# Patient Record
Sex: FEMALE | Race: WHITE | Employment: UNEMPLOYED | URBAN - METROPOLITAN AREA
[De-identification: names, ages, dates, MRNs, and addresses within clinical notes are randomized per-mention and may not be internally consistent; named-entity substitution may affect disease eponyms.]

---

## 2018-08-30 ENCOUNTER — TRANSFERRED RECORDS (OUTPATIENT)
Dept: HEALTH INFORMATION MANAGEMENT | Facility: CLINIC | Age: 43
End: 2018-08-30

## 2018-08-31 LAB
ALT SERPL-CCNC: 24 U/L (ref 7–55)
AST SERPL-CCNC: 13 U/L (ref 5–34)
CREAT SERPL-MCNC: 0.8 MG/DL (ref 0.6–1.3)
GFR SERPL CREATININE-BSD FRML MDRD: >60 ML/MIN/1.73M2
GLUCOSE SERPL-MCNC: 89 MG/DL (ref 70–99)
POTASSIUM SERPL-SCNC: 3.9 MMOL/L (ref 3.6–5.5)

## 2018-09-01 ENCOUNTER — HOSPITAL ENCOUNTER (INPATIENT)
Facility: CLINIC | Age: 43
LOS: 11 days | Discharge: HOME HEALTH | End: 2018-09-12
Attending: NEUROLOGICAL SURGERY | Admitting: NEUROLOGICAL SURGERY
Payer: COMMERCIAL

## 2018-09-01 ENCOUNTER — APPOINTMENT (OUTPATIENT)
Dept: GENERAL RADIOLOGY | Facility: CLINIC | Age: 43
End: 2018-09-01
Attending: STUDENT IN AN ORGANIZED HEALTH CARE EDUCATION/TRAINING PROGRAM
Payer: COMMERCIAL

## 2018-09-01 DIAGNOSIS — T85.730A: Primary | ICD-10-CM

## 2018-09-01 PROBLEM — T85.618A SHUNT MALFUNCTION: Status: ACTIVE | Noted: 2018-09-01

## 2018-09-01 LAB
ALBUMIN UR-MCNC: NEGATIVE MG/DL
APPEARANCE UR: CLEAR
BACTERIA #/AREA URNS HPF: ABNORMAL /HPF
BILIRUB UR QL STRIP: NEGATIVE
COLOR UR AUTO: ABNORMAL
GLUCOSE UR STRIP-MCNC: NEGATIVE MG/DL
HGB UR QL STRIP: NEGATIVE
KETONES UR STRIP-MCNC: NEGATIVE MG/DL
LEUKOCYTE ESTERASE UR QL STRIP: NEGATIVE
MUCOUS THREADS #/AREA URNS LPF: PRESENT /LPF
NITRATE UR QL: NEGATIVE
PH UR STRIP: 6 PH (ref 5–7)
RBC #/AREA URNS AUTO: <1 /HPF (ref 0–2)
SOURCE: ABNORMAL
SP GR UR STRIP: 1.01 (ref 1–1.03)
SQUAMOUS #/AREA URNS AUTO: <1 /HPF (ref 0–1)
UROBILINOGEN UR STRIP-MCNC: NORMAL MG/DL (ref 0–2)
WBC #/AREA URNS AUTO: 1 /HPF (ref 0–5)

## 2018-09-01 PROCEDURE — 25000132 ZZH RX MED GY IP 250 OP 250 PS 637: Performed by: STUDENT IN AN ORGANIZED HEALTH CARE EDUCATION/TRAINING PROGRAM

## 2018-09-01 PROCEDURE — 87040 BLOOD CULTURE FOR BACTERIA: CPT | Performed by: STUDENT IN AN ORGANIZED HEALTH CARE EDUCATION/TRAINING PROGRAM

## 2018-09-01 PROCEDURE — 36415 COLL VENOUS BLD VENIPUNCTURE: CPT | Performed by: STUDENT IN AN ORGANIZED HEALTH CARE EDUCATION/TRAINING PROGRAM

## 2018-09-01 PROCEDURE — 87070 CULTURE OTHR SPECIMN AEROBIC: CPT | Performed by: STUDENT IN AN ORGANIZED HEALTH CARE EDUCATION/TRAINING PROGRAM

## 2018-09-01 PROCEDURE — 74021 RADEX ABDOMEN 3+ VIEWS: CPT

## 2018-09-01 PROCEDURE — 84157 ASSAY OF PROTEIN OTHER: CPT | Performed by: STUDENT IN AN ORGANIZED HEALTH CARE EDUCATION/TRAINING PROGRAM

## 2018-09-01 PROCEDURE — 12000008 ZZH R&B INTERMEDIATE UMMC

## 2018-09-01 PROCEDURE — 87077 CULTURE AEROBIC IDENTIFY: CPT | Performed by: STUDENT IN AN ORGANIZED HEALTH CARE EDUCATION/TRAINING PROGRAM

## 2018-09-01 PROCEDURE — 82945 GLUCOSE OTHER FLUID: CPT | Performed by: STUDENT IN AN ORGANIZED HEALTH CARE EDUCATION/TRAINING PROGRAM

## 2018-09-01 PROCEDURE — 40000065 ZZH STATISTIC EKG NON-CHARGEABLE

## 2018-09-01 PROCEDURE — 87205 SMEAR GRAM STAIN: CPT | Performed by: STUDENT IN AN ORGANIZED HEALTH CARE EDUCATION/TRAINING PROGRAM

## 2018-09-01 PROCEDURE — 87186 SC STD MICRODIL/AGAR DIL: CPT | Performed by: STUDENT IN AN ORGANIZED HEALTH CARE EDUCATION/TRAINING PROGRAM

## 2018-09-01 PROCEDURE — 93010 ELECTROCARDIOGRAM REPORT: CPT | Performed by: INTERNAL MEDICINE

## 2018-09-01 PROCEDURE — 81001 URINALYSIS AUTO W/SCOPE: CPT | Performed by: STUDENT IN AN ORGANIZED HEALTH CARE EDUCATION/TRAINING PROGRAM

## 2018-09-01 RX ORDER — HYDRALAZINE HYDROCHLORIDE 20 MG/ML
10-20 INJECTION INTRAMUSCULAR; INTRAVENOUS EVERY 30 MIN PRN
Status: DISCONTINUED | OUTPATIENT
Start: 2018-09-01 | End: 2018-09-02

## 2018-09-01 RX ORDER — BUPROPION HYDROCHLORIDE 200 MG/1
200 TABLET, EXTENDED RELEASE ORAL DAILY
Status: DISCONTINUED | OUTPATIENT
Start: 2018-09-02 | End: 2018-09-02

## 2018-09-01 RX ORDER — LABETALOL HYDROCHLORIDE 5 MG/ML
10-40 INJECTION, SOLUTION INTRAVENOUS EVERY 10 MIN PRN
Status: DISCONTINUED | OUTPATIENT
Start: 2018-09-01 | End: 2018-09-02

## 2018-09-01 RX ORDER — NALOXONE HYDROCHLORIDE 0.4 MG/ML
.1-.4 INJECTION, SOLUTION INTRAMUSCULAR; INTRAVENOUS; SUBCUTANEOUS
Status: DISCONTINUED | OUTPATIENT
Start: 2018-09-01 | End: 2018-09-12 | Stop reason: HOSPADM

## 2018-09-01 RX ORDER — OXYCODONE HYDROCHLORIDE 5 MG/1
5 TABLET ORAL EVERY 4 HOURS PRN
Status: DISCONTINUED | OUTPATIENT
Start: 2018-09-01 | End: 2018-09-02

## 2018-09-01 RX ORDER — LIDOCAINE 40 MG/G
CREAM TOPICAL
Status: DISCONTINUED | OUTPATIENT
Start: 2018-09-01 | End: 2018-09-12 | Stop reason: HOSPADM

## 2018-09-01 RX ORDER — ONDANSETRON 2 MG/ML
4-8 INJECTION INTRAMUSCULAR; INTRAVENOUS EVERY 6 HOURS PRN
Status: DISCONTINUED | OUTPATIENT
Start: 2018-09-01 | End: 2018-09-12 | Stop reason: HOSPADM

## 2018-09-01 RX ORDER — SODIUM CHLORIDE 9 MG/ML
INJECTION, SOLUTION INTRAVENOUS CONTINUOUS
Status: DISCONTINUED | OUTPATIENT
Start: 2018-09-01 | End: 2018-09-01

## 2018-09-01 RX ORDER — LEVOTHYROXINE SODIUM 75 UG/1
75 TABLET ORAL
Status: DISCONTINUED | OUTPATIENT
Start: 2018-09-02 | End: 2018-09-12 | Stop reason: HOSPADM

## 2018-09-01 RX ORDER — ONDANSETRON 4 MG/1
4-8 TABLET, ORALLY DISINTEGRATING ORAL EVERY 6 HOURS PRN
Status: DISCONTINUED | OUTPATIENT
Start: 2018-09-01 | End: 2018-09-12 | Stop reason: HOSPADM

## 2018-09-01 RX ORDER — PROCHLORPERAZINE MALEATE 10 MG
10 TABLET ORAL EVERY 6 HOURS PRN
Status: DISCONTINUED | OUTPATIENT
Start: 2018-09-01 | End: 2018-09-12 | Stop reason: HOSPADM

## 2018-09-01 RX ORDER — SODIUM CHLORIDE 9 MG/ML
INJECTION, SOLUTION INTRAVENOUS CONTINUOUS
Status: DISCONTINUED | OUTPATIENT
Start: 2018-09-02 | End: 2018-09-02

## 2018-09-01 RX ORDER — ACETAMINOPHEN 325 MG/1
650 TABLET ORAL EVERY 4 HOURS PRN
Status: DISCONTINUED | OUTPATIENT
Start: 2018-09-01 | End: 2018-09-12 | Stop reason: HOSPADM

## 2018-09-01 RX ORDER — POTASSIUM CHLORIDE 29.8 MG/ML
20 INJECTION INTRAVENOUS
Status: DISCONTINUED | OUTPATIENT
Start: 2018-09-01 | End: 2018-09-12 | Stop reason: HOSPADM

## 2018-09-01 RX ORDER — POTASSIUM CHLORIDE 750 MG/1
20-40 TABLET, EXTENDED RELEASE ORAL
Status: DISCONTINUED | OUTPATIENT
Start: 2018-09-01 | End: 2018-09-12 | Stop reason: HOSPADM

## 2018-09-01 RX ORDER — POTASSIUM CL/LIDO/0.9 % NACL 10MEQ/0.1L
10 INTRAVENOUS SOLUTION, PIGGYBACK (ML) INTRAVENOUS
Status: DISCONTINUED | OUTPATIENT
Start: 2018-09-01 | End: 2018-09-02 | Stop reason: RX

## 2018-09-01 RX ORDER — POTASSIUM CHLORIDE 1.5 G/1.58G
20-40 POWDER, FOR SOLUTION ORAL
Status: DISCONTINUED | OUTPATIENT
Start: 2018-09-01 | End: 2018-09-12 | Stop reason: HOSPADM

## 2018-09-01 RX ORDER — POTASSIUM CHLORIDE 7.45 MG/ML
10 INJECTION INTRAVENOUS
Status: DISCONTINUED | OUTPATIENT
Start: 2018-09-01 | End: 2018-09-12 | Stop reason: HOSPADM

## 2018-09-01 RX ORDER — MAGNESIUM SULFATE HEPTAHYDRATE 40 MG/ML
4 INJECTION, SOLUTION INTRAVENOUS EVERY 4 HOURS PRN
Status: DISCONTINUED | OUTPATIENT
Start: 2018-09-01 | End: 2018-09-12 | Stop reason: HOSPADM

## 2018-09-01 RX ADMIN — Medication 1125 MG: at 22:44

## 2018-09-01 RX ADMIN — OXYCODONE HYDROCHLORIDE 5 MG: 5 TABLET ORAL at 22:20

## 2018-09-01 RX ADMIN — ACETAMINOPHEN 650 MG: 325 TABLET, FILM COATED ORAL at 22:20

## 2018-09-01 ASSESSMENT — PAIN DESCRIPTION - DESCRIPTORS
DESCRIPTORS: HEADACHE
DESCRIPTORS: HEADACHE

## 2018-09-01 ASSESSMENT — VISUAL ACUITY: OU: NORMAL ACUITY

## 2018-09-01 NOTE — IP AVS SNAPSHOT
[address] [address]    Phone:  [phone]                                       After Visit Summary   9/1/2018    Bandar Olmedo    MRN: [id number]           After Visit Summary Signature Page     I have received my discharge instructions, and my questions have been answered. I have discussed any challenges I see with this plan with the nurse or doctor.    ..........................................................................................................................................  Patient/Patient Representative Signature      ..........................................................................................................................................  Patient Representative Print Name and Relationship to Patient    ..................................................               ................................................  Date                                   Time    ..........................................................................................................................................  Reviewed by Signature/Title    ...................................................              ..............................................  Date                                               Time          22EPIC Rev 08/18

## 2018-09-01 NOTE — IP AVS SNAPSHOT
MRN:[id number]                      After Visit Summary   9/1/2018    Bandar Olmedo    MRN: [id number]           Thank you!     Thank you for choosing [facility] for your care. Our goal is always to provide you with excellent care. Hearing back from our patients is one way we can continue to improve our services. Please take a few minutes to complete the written survey that you may receive in the mail after you visit with us. Thank you!        Patient Information     Date Of Birth          1975        Designated Caregiver       Most Recent Value    Caregiver    Will someone help with your care after discharge? yes    Name of designated caregiver Goldie Olmedo-mother    Phone number of caregiver [phone]    Caregiver address see my file      About your hospital stay     You were admitted on:  September 1, 2018 You last received care in the:  [address] [facility]    You were discharged on:  September 12, 2018        Reason for your hospital stay       You underwent surgery for shunt removal and EVD placement                  Who to Call     For medical emergencies, please call 911.  For non-urgent questions about your medical care, please call your primary care provider or clinic, [phone]  For questions related to your surgery, please call your surgery clinic        Attending Provider     Provider Specialty    Antonio Salinas MD Neurosurgery       Primary Care Provider Office Phone # Fax #    Amna RICH Benitez [phone]      After Care Instructions     Activity       Your activity upon discharge:   Do not do any bending, twisting, strenuous exercise, or heavy lifting (greater than 10 pounds) for 4-6 weeks. Be careful and ask for assistance when walking or going up and down stairs. Avoid any activities that could result in trauma to the surgical wound. Do not drive within 3 months of having your last seizure or while using narcotics or other sedating  medications, such as sleep aids, muscle relaxants, etc.            Diet       Follow this diet upon discharge: Orders Placed This Encounter      Advance Diet as Tolerated: Regular Diet Adult; Regular Diet Adult            Discharge Instructions           Wound care and dressings       Instructions to care for your wound at home:   You should remove your dressings and bandages on post-operative day #2. You should then keep the wound undressed and open to air. You are allowed to take showers and get the wound wet starting on post-operative day #3 but you may not scrub or soak the wound or keep it submerged under water. If you do happen to get the wound wet, be sure to pat dry it rather than scrubbing it with a towel.                  Follow-up Appointments     Adult [facility] Follow-up and recommended labs and tests       Follow-up with Dr. Antonio Salinas MD in 4 weeks with MRI brain with/without contrast    Follow-up with Dr. Willoughby on 9/26/2018.  Will need weekly CMP, CBC, and CRP     Follow-up with Neurology to discuss discontinuation     Will need to follow up with Occupational therapy as an outpatient for driving assessment and vision therapy.  Will also need outpatient physical therapy.     Please follow-up with PCP for suture removal on 9/17/2018    Appointments on [place] and/or [facility] (with [facility] or [facility] provider or service). Call [phone] if you haven't heard regarding these appointments within 7 days of discharge.                  Additional Services     Home infusion referral       [facility]   Phone:  [phone]    Home IV antibiotics and supplies    Discharge Address:  To be determined    Anticipated Length of Therapy:  See Nafcillin prescription    Home Infusion Pharmacist to adjust therapy based on labs and clinical assessments: Yes    Labs:  May draw labs from Venous Catheter: Yes.   Labs:  Weekly CMP, CBC with platelets, CRP.  Home Infusion Pharmacist to order  "labs based on therapy type and clinical assessments: Yes  Call/Fax Lab Results to:   Dr Austin, Infectious Disease, [facility]   Infectious Disease Clinic phone: [phone]  Fax: [phone].      Agency Staff to assess nursing needs for Infusion Therapy.    Access Device Management:  IV Access Type: PICC  Flush with Heparin and Normal Saline IVP PRN and routine site care (per agency protocol) to maintain access device? Yes            Infectious Disease Referral       Please schedule follow-up with Dr. Sarkar on 9/26/18            Neurology Adult Referral       [facility] management            Occupational Therapy Referral       *This therapy referral will be filtered to a centralized scheduling office at [facility] and the patient will receive a call to schedule an appointment at a [facility] location most convenient for them. *     Vision therapy  Driving evaluation     [facility] provides Occupational Therapy evaluation and treatment and many specialty services across the [facility] system.  If requesting a specialty program, please choose from the list below.    If you have not heard from the scheduling office within 2 business days, please call [phone] for all locations, with the exception of [place], please call [phone] and [facility], please call [phone]    Treatment: Evaluation & Treatment  Special Instructions/Modalities: Vision therapy, driving assessment   Special Programs: Low Vision and Driving assessment     Please be aware that coverage of these services is subject to the terms and limitations of your health insurance plan.  Call member services at your health plan with any benefit or coverage questions.      **Note to Provider:  If you are referring outside of [facility] for the therapy appointment, please list the name of the location in the \"special instructions\" above, print the referral and give to the patient to schedule the appointment.  " "          Physical Therapy Referral       *This therapy referral will be filtered to a centralized scheduling office at [facility] and the patient will receive a call to schedule an appointment at a [facility] location most convenient for them. *     [facility] provides Physical Therapy evaluation and treatment and many specialty services across the [facility] system.  If requesting a specialty program, please choose from the list below.    If you have not heard from the scheduling office within 2 business days, please call [phone] for all locations, with the exception of [place], please call [phone] and [facility], please call [phone]  Treatment: Evaluation & Treatment  Special Instructions/Modalities: strength and gait training   Special Programs: None    Please be aware that coverage of these services is subject to the terms and limitations of your health insurance plan.  Call member services at your health plan with any benefit or coverage questions.      **Note to Provider:  If you are referring outside of [facility] for the therapy appointment, please list the name of the location in the \"special instructions\" above, print the referral and give to the patient to schedule the appointment.                  Future tests that were ordered for you     MR Brain w/o & w Contrast           CBC with platelets  (weekly )      Last Lab Result: Hemoglobin (g/dL)       Date                     Value                 09/11/2018               11.1 (L)           Please fax results to Dr Willoughby @ [phone]  ----------            CRP inflammation       Please fax results to Dr Willoughby @ [phone]            Comprehensive metabolic panel  (weekly)      Please fax results to Dr Willoughby @ [phone]                  Pending Results     Date and Time Order Name Status Description    9/10/2018 0020 Anaerobic CSF culture Preliminary     9/10/2018 0020 " "CSF Culture Aerobic Bacterial Preliminary     9/9/2018 0655 CSF Culture Aerobic Bacterial Preliminary     9/9/2018 0655 Anaerobic CSF culture Preliminary     9/8/2018 0328 CSF Culture Aerobic Bacterial Preliminary     9/8/2018 0328 Anaerobic CSF culture Preliminary     9/6/2018 0650 Anaerobic CSF culture Preliminary     9/5/2018 1814 Anaerobic CSF culture Preliminary     9/4/2018 2328 Anaerobic CSF culture Preliminary     9/3/2018 1918 Anaerobic CSF culture Preliminary     9/3/2018 0732 Anaerobic CSF culture Preliminary     9/2/2018 0915 Fungus Culture, non-blood Preliminary     9/2/2018 0915 Anaerobic bacterial culture Preliminary     9/2/2018 0910 Fungus Culture, non-blood Preliminary     9/2/2018 0910 Anaerobic bacterial culture Preliminary     9/2/2018 0904 Fungus Culture, non-blood Preliminary     9/2/2018 0904 Anaerobic bacterial culture Preliminary     9/2/2018 0900 Fungus Culture, non-blood Preliminary     9/2/2018 0900 Anaerobic bacterial culture Preliminary             Statement of Approval     Ordered          09/12/18 0809  I have reviewed and agree with all the recommendations and orders detailed in this document.  EFFECTIVE NOW     Approved and electronically signed by:  Mami Ash APRN CNP             Admission Information     Date & Time Provider Department Dept. Phone    9/1/2018 Antonio Salinas MD [address] [facility] [phone]      Your Vitals Were     Blood Pressure Pulse Temperature Respirations Height Weight    135/83 79 97.9  F (36.6  C) (Oral) 16 1.803 m (5' 11\") 86.3 kg (190 lb 4.1 oz)    Pulse Oximetry BMI (Body Mass Index)                98% 26.54 kg/m2          [email or web address] Information     [employer] lets you send messages to your doctor, view your test results, renew your prescriptions, schedule appointments and more. To sign up, go to [email or web address] . Click on \"Log in\" on the left side of the screen, which will take you to the Welcome page. Then click on " "\"Sign up Now\" on the right side of the page.     You will be asked to enter the access code listed below, as well as some personal information. Please follow the directions to create your username and password.     Your access code is: [email or web address]  Expires: 12/10/2018 12:36 PM     Your access code will  in 90 days. If you need help or a new code, please call your [facility] clinic or [phone].        Care EveryWhere ID     This is your Care EveryWhere ID. This could be used by other organizations to access your [facility] medical records  [id number]        Equal Access to Services     [facility]: Hadnegrita Cosme, cici skaggs, thierno lorenz, hemanth draper . So [facility] [phone].    ATENCIÓN: Si habla español, tiene a christopher disposición servicios gratuitos de asistencia lingüística. Llame al [phone].    We comply with applicable federal civil rights laws and [place] laws. We do not discriminate on the basis of race, color, national origin, age, disability, sex, sexual orientation, or gender identity.               Review of your medicines      START taking        Dose / Directions    nafcillin 2 GM vial   Indication:  Abscess, MSSA intracranial abscess   Used for:  Infection of ventricular shunt, initial encounter (H)        Dose:  2 g   Inject 2 g into the vein every 4 hours   Quantity:  1440 mL   Refills:  1       oxyCODONE IR 5 MG tablet   Commonly known as:  ROXICODONE   Used for:  Infection of ventricular shunt, initial encounter (H)        Dose:  5 mg   Take 1 tablet (5 mg) by mouth every 4 hours as needed for other (pain control or improvement in physical function. Hold dose for analgesic side effects.)   Quantity:  60 tablet   Refills:  0       polyethylene glycol Packet   Commonly known as:  MIRALAX/GLYCOLAX   Used for:  Infection of ventricular shunt, initial encounter (H)        Dose:  17 g   Take 17 g by mouth 3 times daily   Quantity:  " 30 packet   Refills:  1       rifampin 300 MG capsule   Commonly known as:  RIFADIN   Indication:  Meningitis   Used for:  Infection of ventricular shunt, initial encounter (H)        Dose:  300 mg   Take 1 capsule (300 mg) by mouth every 12 hours   Quantity:  60 capsule   Refills:  1       senna-docusate 8.6-50 MG per tablet   Commonly known as:  SENOKOT-S;PERICOLACE   Used for:  Infection of ventricular shunt, initial encounter (H)        Dose:  2 tablet   Take 2 tablets by mouth 2 times daily   Quantity:  30 tablet   Refills:  0         CONTINUE these medicines which may have CHANGED, or have new prescriptions. If we are uncertain of the size of tablets/capsules you have at home, strength may be listed as something that might have changed.        Dose / Directions    levETIRAcetam 1000 MG Tabs   This may have changed:    - medication strength  - how much to take   Used for:  Infection of ventricular shunt, initial encounter (H)        Dose:  1000 mg   Take 1,000 mg by mouth 2 times daily   Quantity:  60 tablet   Refills:  1         CONTINUE these medicines which have NOT CHANGED        Dose / Directions    AMBIEN PO        Dose:  10 mg   Take 10 mg by mouth nightly as needed for sleep   Refills:  0       KLONOPIN PO        Dose:  1 mg   Take 1 mg by mouth 3 times daily as needed for anxiety   Refills:  0       norgestimate-ethinyl estradiol 0.25-35 MG-MCG per tablet   Commonly known as:  ORTHO-CYCLEN, SPRINTEC        Dose:  1 tablet   Take 1 tablet by mouth daily   Refills:  0       PROZAC PO        Dose:  60 mg   Take 60 mg by mouth daily   Refills:  0       SYNTHROID PO        Dose:  75 mcg   Take 75 mcg by mouth daily   Refills:  0       TYLENOL PO        Dose:  500 mg   Take 500 mg by mouth every 6 hours as needed for mild pain or fever   Refills:  0       VOLTAREN PO   Indication:  pain        Dose:  50 mg   Take 50 mg by mouth 3 times daily as needed for moderate pain   Refills:  0         STOP taking      WELLBUTRIN SR PO                Where to get your medicines      These medications were sent to [facility] - [place], [place] - [address]  [address], [facility] [address]     Phone:  [phone]     levETIRAcetam 1000 MG Tabs    nafcillin 2 GM vial    polyethylene glycol Packet    rifampin 300 MG capsule    senna-docusate 8.6-50 MG per tablet         Some of these will need a paper prescription and others can be bought over the counter. Ask your nurse if you have questions.     Bring a paper prescription for each of these medications     oxyCODONE IR 5 MG tablet                Protect others around you: Learn how to safely use, store and throw away your medicines at www.disposemymeds.org.        ANTIBIOTIC INSTRUCTION     You've Been Prescribed an Antibiotic - Now What?  Your healthcare team thinks that you or your loved one might have an infection. Some infections can be treated with antibiotics, which are powerful, life-saving drugs. Like all medications, antibiotics have side effects and should only be used when necessary. There are some important things you should know about your antibiotic treatment.      Your healthcare team may run tests before you start taking an antibiotic.    Your team may take samples (e.g., from your blood, urine or other areas) to run tests to look for bacteria. These test can be important to determine if you need an antibiotic at all and, if you do, which antibiotic will work best.      Within a few days, your healthcare team might change or even stop your antibiotic.    Your team may start you on an antibiotic while they are working to find out what is making you sick.    Your team might change your antibiotic because test results show that a different antibiotic would be better to treat your infection.    In some cases, once your team has more information, they learn that you do not need an antibiotic at all. They may find out that you don't have  an infection, or that the antibiotic you're taking won't work against your infection. For example, an infection caused by a virus can't be treated with antibiotics. Staying on an antibiotic when you don't need it is more likely to be harmful than helpful.      You may experience side effects from your antibiotic.    Like all medications, antibiotics have side effects. Some of these can be serious.    Let you healthcare team know if you have any known allergies when you are admitted to the hospital.    One significant side effect of nearly all antibiotics is the risk of severe and sometimes deadly diarrhea caused by Clostridium difficile (C. Difficile). This occurs when a person takes antibiotics because some good germs are destroyed. Antibiotic use allows C. diificile to take over, putting patients at high risk for this serious infection.    As a patient or caregiver, it is important to understand your or your loved one's antibiotic treatment. It is especially important for caregivers to speak up when patients can't speak for themselves. Here are some important questions to ask your healthcare team.    What infection is this antibiotic treating and how do you know I have that infection?    What side effects might occur from this antibiotic?    How long will I need to take this antibiotic?    Is it safe to take this antibiotic with other medications or supplements (e.g., vitamins) that I am taking?     Are there any special directions I need to know about taking this antibiotic? For example, should I take it with food?    How will I be monitored to know whether my infection is responding to the antibiotic?    What tests may help to make sure the right antibiotic is prescribed for me?      Information provided by:  www.cdc.gov/getsmart  U.S. Department of Health and Human Services  Centers for disease Control and Prevention  National Center for Emerging and Zoonotic Infectious Diseases  Division of Healthcare Quality  Promotion        Information about OPIOIDS     PRESCRIPTION OPIOIDS: WHAT YOU NEED TO KNOW   We gave you an opioid (narcotic) pain medicine. It is important to manage your pain, but opioids are not always the best choice. You should first try all the other options your care team gave you. Take this medicine for as short a time (and as few doses) as possible.    Some activities can increase your pain, such as bandage changes or therapy sessions. It may help to take your pain medicine 30 to 60 minutes before these activities. Reduce your stress by getting enough sleep, working on hobbies you enjoy and practicing relaxation or meditation. Talk to your care team about ways to manage your pain beyond prescription opioids.    These medicines have risks:    DO NOT drive when on new or higher doses of pain medicine. These medicines can affect your alertness and reaction times, and you could be arrested for driving under the influence (DUI). If you need to use opioids long-term, talk to your care team about driving.    DO NOT operate heavy machinery    DO NOT do any other dangerous activities while taking these medicines.    DO NOT drink any alcohol while taking these medicines.     If the opioid prescribed includes acetaminophen, DO NOT take with any other medicines that contain acetaminophen. Read all labels carefully. Look for the word  acetaminophen  or  Tylenol.  Ask your pharmacist if you have questions or are unsure.    You can get addicted to pain medicines, especially if you have a history of addiction (chemical, alcohol or substance dependence). Talk to your care team about ways to reduce this risk.    All opioids tend to cause constipation. Drink plenty of water and eat foods that have a lot of fiber, such as fruits, vegetables, prune juice, apple juice and high-fiber cereal. Take a laxative (Miralax, milk of magnesia, Colace, Senna) if you don t move your bowels at least every other day. Other side effects  include upset stomach, sleepiness, dizziness, throwing up, tolerance (needing more of the medicine to have the same effect), physical dependence and slowed breathing.    Store your pills in a secure place, locked if possible. We will not replace any lost or stolen medicine. If you don t finish your medicine, please throw away (dispose) as directed by your pharmacist. The [place] Pollution Control Agency has more information about safe disposal: https://www.pca.[facility].[place].us/living-green/managing-unwanted-medications             Medication List: This is a list of all your medications and when to take them. Check marks below indicate your daily home schedule. Keep this list as a reference.      Medications           Morning Afternoon Evening Bedtime As Needed    AMBIEN PO   Take 10 mg by mouth nightly as needed for sleep                                KLONOPIN PO   Take 1 mg by mouth 3 times daily as needed for anxiety                                levETIRAcetam 1000 MG Tabs   Take 1,000 mg by mouth 2 times daily   Last time this was given:  1,000 mg on 9/12/2018  8:21 AM                                nafcillin 2 GM vial   Inject 2 g into the vein every 4 hours   Last time this was given:  2 g on 9/12/2018 12:49 PM                                norgestimate-ethinyl estradiol 0.25-35 MG-MCG per tablet   Commonly known as:  ORTHO-CYCLEN, SPRINTEC   Take 1 tablet by mouth daily                                oxyCODONE IR 5 MG tablet   Commonly known as:  ROXICODONE   Take 1 tablet (5 mg) by mouth every 4 hours as needed for other (pain control or improvement in physical function. Hold dose for analgesic side effects.)   Last time this was given:  5 mg on 9/12/2018 12:49 PM                                polyethylene glycol Packet   Commonly known as:  MIRALAX/GLYCOLAX   Take 17 g by mouth 3 times daily   Last time this was given:  17 g on 9/7/2018  8:06 AM                                PROZAC PO   Take 60 mg by mouth  daily   Last time this was given:  60 mg on 9/12/2018  8:22 AM                                rifampin 300 MG capsule   Commonly known as:  RIFADIN   Take 1 capsule (300 mg) by mouth every 12 hours   Last time this was given:  300 mg on 9/12/2018 12:49 PM                                senna-docusate 8.6-50 MG per tablet   Commonly known as:  SENOKOT-S;PERICOLACE   Take 2 tablets by mouth 2 times daily   Last time this was given:  2 tablets on 9/7/2018  8:14 AM                                SYNTHROID PO   Take 75 mcg by mouth daily   Last time this was given:  75 mcg on 9/12/2018  8:22 AM                                TYLENOL PO   Take 500 mg by mouth every 6 hours as needed for mild pain or fever   Last time this was given:  650 mg on 9/12/2018 12:49 PM                                VOLTAREN PO   Take 50 mg by mouth 3 times daily as needed for moderate pain

## 2018-09-01 NOTE — IP AVS SNAPSHOT
"    [address] [facility]: [phone]                                              INTERAGENCY TRANSFER FORM - PHYSICIAN ORDERS   2018                    Hospital Admission Date: 2018  FORTINO MCBRIDE   : 1975  Sex: Female        Attending Provider: Antonio Salinas MD     Allergies:  No Known Allergies    Infection:  None   Service:  NEUROSURGERY    Ht:  1.803 m (5' 11\")   Wt:  86.3 kg (190 lb 4.1 oz)   Admission Wt:  88.2 kg (194 lb 6.4 oz)    BMI:  26.54 kg/m 2   BSA:  2.08 m 2            Patient PCP Information     Provider PCP Type    Provider Not In System General      ED Clinical Impression     Diagnosis Description Comment Added By Time Added    Infection of ventricular shunt, initial encounter (H) [T85.730A] Infection of ventricular shunt, initial encounter (H) [T85.730A]  Jaydon Dorsey MD 2018  1:47 PM      Hospital Problems as of 2018              Priority Class Noted POA    Shunt malfunction Medium  2018 Yes      Non-Hospital Problems as of 2018     None      Code Status History     Date Active Date Inactive Code Status Order ID Comments User Context    2018  1:41 PM  Full Code [id number]  Mami Ash APRN CNP Outpatient         Medication Review      START taking        Dose / Directions Comments    nafcillin 2 GM vial   Indication:  Abscess, MSSA intracranial abscess   Used for:  Infection of ventricular shunt, initial encounter (H)        Dose:  2 g   Inject 2 g into the vein every 4 hours   Quantity:  1440 mL   Refills:  1        oxyCODONE IR 5 MG tablet   Commonly known as:  ROXICODONE   Used for:  Infection of ventricular shunt, initial encounter (H)        Dose:  5 mg   Take 1 tablet (5 mg) by mouth every 4 hours as needed for other (pain control or improvement in physical function. Hold dose for analgesic side effects.)   Quantity:  60 tablet   Refills:  0        polyethylene glycol Packet   Commonly known as:  " MIRALAX/GLYCOLAX   Used for:  Infection of ventricular shunt, initial encounter (H)        Dose:  17 g   Take 17 g by mouth 3 times daily   Quantity:  30 packet   Refills:  1        rifampin 300 MG capsule   Commonly known as:  RIFADIN   Indication:  Meningitis   Used for:  Infection of ventricular shunt, initial encounter (H)        Dose:  300 mg   Take 1 capsule (300 mg) by mouth every 12 hours   Quantity:  60 capsule   Refills:  1        senna-docusate 8.6-50 MG per tablet   Commonly known as:  SENOKOT-S;PERICOLACE   Used for:  Infection of ventricular shunt, initial encounter (H)        Dose:  2 tablet   Take 2 tablets by mouth 2 times daily   Quantity:  30 tablet   Refills:  0          CONTINUE these medications which may have CHANGED, or have new prescriptions. If we are uncertain of the size of tablets/capsules you have at home, strength may be listed as something that might have changed.        Dose / Directions Comments    levETIRAcetam 1000 MG Tabs   This may have changed:    - medication strength  - how much to take   Used for:  Infection of ventricular shunt, initial encounter (H)        Dose:  1000 mg   Take 1,000 mg by mouth 2 times daily   Quantity:  60 tablet   Refills:  1          CONTINUE these medications which have NOT CHANGED        Dose / Directions Comments    AMBIEN PO        Dose:  10 mg   Take 10 mg by mouth nightly as needed for sleep   Refills:  0        KLONOPIN PO        Dose:  1 mg   Take 1 mg by mouth 3 times daily as needed for anxiety   Refills:  0        norgestimate-ethinyl estradiol 0.25-35 MG-MCG per tablet   Commonly known as:  ORTHO-CYCLEN, SPRINTEC        Dose:  1 tablet   Take 1 tablet by mouth daily   Refills:  0        PROZAC PO        Dose:  60 mg   Take 60 mg by mouth daily   Refills:  0        SYNTHROID PO        Dose:  75 mcg   Take 75 mcg by mouth daily   Refills:  0        TYLENOL PO        Dose:  500 mg   Take 500 mg by mouth every 6 hours as needed for mild pain or  fever   Refills:  0        VOLTAREN PO   Indication:  pain        Dose:  50 mg   Take 50 mg by mouth 3 times daily as needed for moderate pain   Refills:  0          STOP taking     WELLBUTRIN SR PO                   Summary of Visit     Reason for your hospital stay       You underwent surgery for shunt removal and EVD placement             After Care     Activity       Your activity upon discharge:   Do not do any bending, twisting, strenuous exercise, or heavy lifting (greater than 10 pounds) for 4-6 weeks. Be careful and ask for assistance when walking or going up and down stairs. Avoid any activities that could result in trauma to the surgical wound. Do not drive within 3 months of having your last seizure or while using narcotics or other sedating medications, such as sleep aids, muscle relaxants, etc.       Diet       Follow this diet upon discharge: Orders Placed This Encounter      Advance Diet as Tolerated: Regular Diet Adult; Regular Diet Adult       Discharge Instructions           Wound care and dressings       Instructions to care for your wound at home:   You should remove your dressings and bandages on post-operative day #2. You should then keep the wound undressed and open to air. You are allowed to take showers and get the wound wet starting on post-operative day #3 but you may not scrub or soak the wound or keep it submerged under water. If you do happen to get the wound wet, be sure to pat dry it rather than scrubbing it with a towel.             Referrals     Home infusion referral       [facility]   Phone:  [phone]    Home IV antibiotics and supplies    Discharge Address:  To be determined    Anticipated Length of Therapy:  See Nafcillin prescription    Home Infusion Pharmacist to adjust therapy based on labs and clinical assessments: Yes    Labs:  May draw labs from Venous Catheter: Yes.   Labs:  Weekly CMP, CBC with platelets, CRP.  Home Infusion Pharmacist to order labs  "based on therapy type and clinical assessments: Yes  Call/Fax Lab Results to:   Dr Austin, Infectious Disease, [facility]   Infectious Disease Clinic phone: [phone]  Fax: [phone].      Agency Staff to assess nursing needs for Infusion Therapy.    Access Device Management:  IV Access Type: PICC  Flush with Heparin and Normal Saline IVP PRN and routine site care (per agency protocol) to maintain access device? Yes       Infectious Disease Referral       Please schedule follow-up with Dr. Sarkar on 9/26/18       Neurology Adult Referral       [facility]       OCCUPATIONAL THERAPY REFERRAL       *This therapy referral will be filtered to a centralized scheduling office at [facility] and the patient will receive a call to schedule an appointment at a [place] location most convenient for them. *     [facility] provides Occupational Therapy evaluation and treatment and many specialty services across the [place] system.  If requesting a specialty program, please choose from the list below.    If you have not heard from the scheduling office within 2 business days, please call [phone] for all locations, with the exception of [place], please call [phone] and [facility], please call [phone]    Treatment: Evaluation & Treatment  Special Instructions/Modalities: Visual therapy, driving assessment   Special Programs: Low Vision    Please be aware that coverage of these services is subject to the terms and limitations of your health insurance plan.  Call member services at your health plan with any benefit or coverage questions.      **Note to Provider:  If you are referring outside of [place] for the therapy appointment, please list the name of the location in the \"special instructions\" above, print the referral and give to the patient to schedule the appointment.       Occupational Therapy Referral       *This therapy referral will be filtered " "to a centralized scheduling office at [facility] and the patient will receive a call to schedule an appointment at a [place] location most convenient for them. *     Vision therapy  Driving evaluation     [facility] provides Occupational Therapy evaluation and treatment and many specialty services across the [facility].  If requesting a specialty program, please choose from the list below.    If you have not heard from the scheduling office within 2 business days, please call [phone] for all locations, with the exception of [place], please call [phone] and [place], please call [phone]    Treatment: Evaluation & Treatment  Special Instructions/Modalities: Vision therapy, driving assessment   Special Programs: Low Vision and Driving assessment     Please be aware that coverage of these services is subject to the terms and limitations of your health insurance plan.  Call member services at your health plan with any benefit or coverage questions.      **Note to Provider:  If you are referring outside of [place] for the therapy appointment, please list the name of the location in the \"special instructions\" above, print the referral and give to the patient to schedule the appointment.       PHYSICAL THERAPY REFERRAL       *This therapy referral will be filtered to a centralized scheduling office at [facility] and the patient will receive a call to schedule an appointment at a [place] location most convenient for them. *     [facility] provides Physical Therapy evaluation and treatment and many specialty services across the [facility].  If requesting a specialty program, please choose from the list below.    If you have not heard from the scheduling office within 2 business days, please call [phone] for all locations, with the exception of [place], please call [phone] and [place], please call " "[phone]  Treatment: Evaluation & Treatment  Special Instructions/Modalities: gait and strength   Special Programs: None    Please be aware that coverage of these services is subject to the terms and limitations of your health insurance plan.  Call member services at your health plan with any benefit or coverage questions.      **Note to Provider:  If you are referring outside of [place] for the therapy appointment, please list the name of the location in the \"special instructions\" above, print the referral and give to the patient to schedule the appointment.       Physical Therapy Referral       *This therapy referral will be filtered to a centralized scheduling office at [facility] and the patient will receive a call to schedule an appointment at a [place] location most convenient for them. *     [facility] provides Physical Therapy evaluation and treatment and many specialty services across the [place] system.  If requesting a specialty program, please choose from the list below.    If you have not heard from the scheduling office within 2 business days, please call [phone] for all locations, with the exception of [place], please call [phone] and [facility], please call [phone]  Treatment: Evaluation & Treatment  Special Instructions/Modalities: strength and gait training   Special Programs: None    Please be aware that coverage of these services is subject to the terms and limitations of your health insurance plan.  Call member services at your health plan with any benefit or coverage questions.      **Note to Provider:  If you are referring outside of [place] for the therapy appointment, please list the name of the location in the \"special instructions\" above, print the referral and give to the patient to schedule the appointment.             Lab Orders     CBC with platelets  (weekly )      Last Lab Result: Hemoglobin (g/dL)       Date          "            Value                 09/11/2018               11.1 (L)           Please fax results to Dr Willoughby @ [phone]  ----------       CRP inflammation       Please fax results to Dr Willoughby @ [phone]       Comprehensive metabolic panel  (weekly)      Please fax results to Dr Willoughby @ [phone]             Radiology & Cardiology Orders     Future Labs/Procedures Complete By Expires    MR Brain w/o & w Contrast  10/11/2018 (Approximate) 9/11/2019    Process Instructions:    Administration of IV contrast (contrast agent, dose, and amount) will be tailored to this examination per the appropriate written protocol listed in the Protocol E-Book, or by the supervising imaging provider.       Radiology & Cardiology Orders     MR Brain w/o & w Contrast       Administration of IV contrast (contrast agent, dose, and amount) will be tailored to this examination per the appropriate written protocol listed in the Protocol E-Book, or by the supervising imaging provider.              Your next 10 appointments already scheduled     Sep 12, 2018  9:00 AM CDT   Peripherally Inserted Central Catheter (PICC) & IV Med - [address] [place] [address] with Christine M Klisch, RN   [facility], [place], [facility] ([facility], [facility])    [address] [facility] [id number]              Appointment is located at [address] [place] [address]              Follow-Up Appointment Instructions     Future Labs/Procedures    Adult [facility] Follow-up and recommended labs and tests     Comments:    Follow-up with Dr. Antonio Salinas MD in 4 weeks with MRI brain with/without contrast    Follow-up with Dr. Willoughby on 9/26/2018.  Will need weekly CMP, CBC, and CRP     Follow-up with Neurology to discuss discontinuation     Will need to follow up with Occupational therapy as an outpatient for driving assessment and vision therapy.  Will  also need outpatient physical therapy.     Please follow-up with PCP for suture removal on 9/17/2018    Appointments on [place] and/or [facility] (with [facility] or [facility] provider or service). Call [phone] if you haven't heard regarding these appointments within 7 days of discharge.      Follow-Up Appointment Instructions     Adult [facility] Follow-up and recommended labs and tests       Follow-up with Dr. Antonio Salinas MD in 4 weeks with MRI brain with/without contrast    Follow-up with Dr. Willoughby on 9/26/2018.  Will need weekly CMP, CBC, and CRP     Follow-up with Neurology to discuss discontinuation     Will need to follow up with Occupational therapy as an outpatient for driving assessment and vision therapy.  Will also need outpatient physical therapy.     Please follow-up with PCP for suture removal on 9/17/2018    Appointments on [place] and/or [facility] (with [facility] or [facility] provider or service). Call [phone] if you haven't heard regarding these appointments within 7 days of discharge.             Statement of Approval     Ordered          09/12/18 0809  I have reviewed and agree with all the recommendations and orders detailed in this document.  EFFECTIVE NOW     Approved and electronically signed by:  Mami Ash APRN CNP

## 2018-09-02 ENCOUNTER — ANESTHESIA (OUTPATIENT)
Dept: SURGERY | Facility: CLINIC | Age: 43
End: 2018-09-02
Payer: COMMERCIAL

## 2018-09-02 ENCOUNTER — ANESTHESIA EVENT (OUTPATIENT)
Dept: SURGERY | Facility: CLINIC | Age: 43
End: 2018-09-02
Payer: COMMERCIAL

## 2018-09-02 ENCOUNTER — APPOINTMENT (OUTPATIENT)
Dept: CT IMAGING | Facility: CLINIC | Age: 43
End: 2018-09-02
Attending: STUDENT IN AN ORGANIZED HEALTH CARE EDUCATION/TRAINING PROGRAM
Payer: COMMERCIAL

## 2018-09-02 ENCOUNTER — APPOINTMENT (OUTPATIENT)
Dept: MRI IMAGING | Facility: CLINIC | Age: 43
End: 2018-09-02
Attending: NEUROLOGICAL SURGERY
Payer: COMMERCIAL

## 2018-09-02 LAB
ABO + RH BLD: NORMAL
ABO + RH BLD: NORMAL
ANION GAP SERPL CALCULATED.3IONS-SCNC: 10 MMOL/L (ref 3–14)
APPEARANCE CSF: ABNORMAL
BACTERIA SPEC CULT: NORMAL
BLD GP AB SCN SERPL QL: NORMAL
BLOOD BANK CMNT PATIENT-IMP: NORMAL
BUN SERPL-MCNC: 5 MG/DL (ref 7–30)
CALCIUM SERPL-MCNC: 9 MG/DL (ref 8.5–10.1)
CHLORIDE SERPL-SCNC: 105 MMOL/L (ref 94–109)
CO2 SERPL-SCNC: 23 MMOL/L (ref 20–32)
COLOR CSF: YELLOW
CREAT SERPL-MCNC: 0.76 MG/DL (ref 0.52–1.04)
CRP SERPL-MCNC: 64 MG/L (ref 0–8)
ERYTHROCYTE [DISTWIDTH] IN BLOOD BY AUTOMATED COUNT: 12.4 % (ref 10–15)
ERYTHROCYTE [SEDIMENTATION RATE] IN BLOOD BY WESTERGREN METHOD: 101 MM/H (ref 0–20)
GFR SERPL CREATININE-BSD FRML MDRD: 83 ML/MIN/1.7M2
GLUCOSE BLDC GLUCOMTR-MCNC: 78 MG/DL (ref 70–99)
GLUCOSE SERPL-MCNC: 78 MG/DL (ref 70–99)
GRAM STN SPEC: ABNORMAL
GRAM STN SPEC: NORMAL
HCG UR QL: NEGATIVE
HCT VFR BLD AUTO: 32.9 % (ref 35–47)
HGB BLD-MCNC: 10.6 G/DL (ref 11.7–15.7)
INR PPP: 1.12 (ref 0.86–1.14)
Lab: ABNORMAL
MAGNESIUM SERPL-MCNC: 2.2 MG/DL (ref 1.6–2.3)
MCH RBC QN AUTO: 30.6 PG (ref 26.5–33)
MCHC RBC AUTO-ENTMCNC: 32.2 G/DL (ref 31.5–36.5)
MCV RBC AUTO: 95 FL (ref 78–100)
MRSA DNA SPEC QL NAA+PROBE: NEGATIVE
PHOSPHATE SERPL-MCNC: 3.2 MG/DL (ref 2.5–4.5)
PLATELET # BLD AUTO: 494 10E9/L (ref 150–450)
POTASSIUM SERPL-SCNC: 4.1 MMOL/L (ref 3.4–5.3)
PROCALCITONIN SERPL-MCNC: <0.05 NG/ML
RBC # BLD AUTO: 3.46 10E12/L (ref 3.8–5.2)
RBC # CSF MANUAL: ABNORMAL /UL (ref 0–2)
SODIUM SERPL-SCNC: 137 MMOL/L (ref 133–144)
SPECIMEN EXP DATE BLD: NORMAL
SPECIMEN SOURCE: ABNORMAL
SPECIMEN SOURCE: NORMAL
TUBE # CSF: ABNORMAL #
WBC # BLD AUTO: 8.7 10E9/L (ref 4–11)
WBC # CSF MANUAL: 0 /UL (ref 0–5)

## 2018-09-02 PROCEDURE — 25000128 H RX IP 250 OP 636

## 2018-09-02 PROCEDURE — 80048 BASIC METABOLIC PNL TOTAL CA: CPT | Performed by: STUDENT IN AN ORGANIZED HEALTH CARE EDUCATION/TRAINING PROGRAM

## 2018-09-02 PROCEDURE — 27210794 ZZH OR GENERAL SUPPLY STERILE: Performed by: NEUROLOGICAL SURGERY

## 2018-09-02 PROCEDURE — 83735 ASSAY OF MAGNESIUM: CPT | Performed by: STUDENT IN AN ORGANIZED HEALTH CARE EDUCATION/TRAINING PROGRAM

## 2018-09-02 PROCEDURE — 20000004 ZZH R&B ICU UMMC

## 2018-09-02 PROCEDURE — 87070 CULTURE OTHR SPECIMN AEROBIC: CPT | Performed by: NEUROLOGICAL SURGERY

## 2018-09-02 PROCEDURE — 86850 RBC ANTIBODY SCREEN: CPT | Performed by: STUDENT IN AN ORGANIZED HEALTH CARE EDUCATION/TRAINING PROGRAM

## 2018-09-02 PROCEDURE — 25000128 H RX IP 250 OP 636: Performed by: ANESTHESIOLOGY

## 2018-09-02 PROCEDURE — 25000128 H RX IP 250 OP 636: Performed by: RADIOLOGY

## 2018-09-02 PROCEDURE — 70553 MRI BRAIN STEM W/O & W/DYE: CPT

## 2018-09-02 PROCEDURE — 009630Z DRAINAGE OF CEREBRAL VENTRICLE WITH DRAINAGE DEVICE, PERCUTANEOUS APPROACH: ICD-10-PCS | Performed by: NEUROLOGICAL SURGERY

## 2018-09-02 PROCEDURE — C9399 UNCLASSIFIED DRUGS OR BIOLOG: HCPCS | Performed by: NURSE ANESTHETIST, CERTIFIED REGISTERED

## 2018-09-02 PROCEDURE — 86901 BLOOD TYPING SEROLOGIC RH(D): CPT | Performed by: STUDENT IN AN ORGANIZED HEALTH CARE EDUCATION/TRAINING PROGRAM

## 2018-09-02 PROCEDURE — 84145 PROCALCITONIN (PCT): CPT | Performed by: STUDENT IN AN ORGANIZED HEALTH CARE EDUCATION/TRAINING PROGRAM

## 2018-09-02 PROCEDURE — 40000077 ZZH STATISTIC ICP MONITORING

## 2018-09-02 PROCEDURE — 36415 COLL VENOUS BLD VENIPUNCTURE: CPT | Performed by: STUDENT IN AN ORGANIZED HEALTH CARE EDUCATION/TRAINING PROGRAM

## 2018-09-02 PROCEDURE — 27210995 ZZH RX 272: Performed by: NEUROLOGICAL SURGERY

## 2018-09-02 PROCEDURE — 25000132 ZZH RX MED GY IP 250 OP 250 PS 637: Performed by: STUDENT IN AN ORGANIZED HEALTH CARE EDUCATION/TRAINING PROGRAM

## 2018-09-02 PROCEDURE — 25000128 H RX IP 250 OP 636: Performed by: STUDENT IN AN ORGANIZED HEALTH CARE EDUCATION/TRAINING PROGRAM

## 2018-09-02 PROCEDURE — 00P63JZ REMOVAL OF SYNTHETIC SUBSTITUTE FROM CEREBRAL VENTRICLE, PERCUTANEOUS APPROACH: ICD-10-PCS | Performed by: NEUROLOGICAL SURGERY

## 2018-09-02 PROCEDURE — 00000146 ZZHCL STATISTIC GLUCOSE BY METER IP

## 2018-09-02 PROCEDURE — 36000076 ZZH SURGERY LEVEL 6 EA 15 ADDTL MIN - UMMC: Performed by: NEUROLOGICAL SURGERY

## 2018-09-02 PROCEDURE — 87205 SMEAR GRAM STAIN: CPT | Performed by: NEUROLOGICAL SURGERY

## 2018-09-02 PROCEDURE — 25000125 ZZHC RX 250: Performed by: NURSE ANESTHETIST, CERTIFIED REGISTERED

## 2018-09-02 PROCEDURE — 36000078 ZZH SURGERY LEVEL 6 W FLUORO 1ST 30 MIN - UMMC: Performed by: NEUROLOGICAL SURGERY

## 2018-09-02 PROCEDURE — 37000009 ZZH ANESTHESIA TECHNICAL FEE, EACH ADDTL 15 MIN: Performed by: NEUROLOGICAL SURGERY

## 2018-09-02 PROCEDURE — 81025 URINE PREGNANCY TEST: CPT

## 2018-09-02 PROCEDURE — 25000565 ZZH ISOFLURANE, EA 15 MIN: Performed by: NEUROLOGICAL SURGERY

## 2018-09-02 PROCEDURE — 84100 ASSAY OF PHOSPHORUS: CPT | Performed by: STUDENT IN AN ORGANIZED HEALTH CARE EDUCATION/TRAINING PROGRAM

## 2018-09-02 PROCEDURE — 86900 BLOOD TYPING SEROLOGIC ABO: CPT | Performed by: STUDENT IN AN ORGANIZED HEALTH CARE EDUCATION/TRAINING PROGRAM

## 2018-09-02 PROCEDURE — 40000556 ZZH STATISTIC PERIPHERAL IV START W US GUIDANCE

## 2018-09-02 PROCEDURE — 85652 RBC SED RATE AUTOMATED: CPT | Performed by: STUDENT IN AN ORGANIZED HEALTH CARE EDUCATION/TRAINING PROGRAM

## 2018-09-02 PROCEDURE — 40000170 ZZH STATISTIC PRE-PROCEDURE ASSESSMENT II: Performed by: NEUROLOGICAL SURGERY

## 2018-09-02 PROCEDURE — A9585 GADOBUTROL INJECTION: HCPCS | Performed by: RADIOLOGY

## 2018-09-02 PROCEDURE — 85027 COMPLETE CBC AUTOMATED: CPT | Performed by: STUDENT IN AN ORGANIZED HEALTH CARE EDUCATION/TRAINING PROGRAM

## 2018-09-02 PROCEDURE — 87075 CULTR BACTERIA EXCEPT BLOOD: CPT | Performed by: NEUROLOGICAL SURGERY

## 2018-09-02 PROCEDURE — 70450 CT HEAD/BRAIN W/O DYE: CPT

## 2018-09-02 PROCEDURE — 86140 C-REACTIVE PROTEIN: CPT | Performed by: STUDENT IN AN ORGANIZED HEALTH CARE EDUCATION/TRAINING PROGRAM

## 2018-09-02 PROCEDURE — 85610 PROTHROMBIN TIME: CPT | Performed by: STUDENT IN AN ORGANIZED HEALTH CARE EDUCATION/TRAINING PROGRAM

## 2018-09-02 PROCEDURE — 27810169 ZZH OR IMPLANT GENERAL: Performed by: NEUROLOGICAL SURGERY

## 2018-09-02 PROCEDURE — 87102 FUNGUS ISOLATION CULTURE: CPT | Performed by: NEUROLOGICAL SURGERY

## 2018-09-02 PROCEDURE — 25000128 H RX IP 250 OP 636: Performed by: NEUROLOGICAL SURGERY

## 2018-09-02 PROCEDURE — 87641 MR-STAPH DNA AMP PROBE: CPT | Performed by: INTERNAL MEDICINE

## 2018-09-02 PROCEDURE — 37000008 ZZH ANESTHESIA TECHNICAL FEE, 1ST 30 MIN: Performed by: NEUROLOGICAL SURGERY

## 2018-09-02 PROCEDURE — 87640 STAPH A DNA AMP PROBE: CPT | Performed by: INTERNAL MEDICINE

## 2018-09-02 PROCEDURE — 25000128 H RX IP 250 OP 636: Performed by: NURSE ANESTHETIST, CERTIFIED REGISTERED

## 2018-09-02 PROCEDURE — 87077 CULTURE AEROBIC IDENTIFY: CPT | Performed by: NEUROLOGICAL SURGERY

## 2018-09-02 PROCEDURE — 71000016 ZZH RECOVERY PHASE 1 LEVEL 3 FIRST HR: Performed by: NEUROLOGICAL SURGERY

## 2018-09-02 DEVICE — IMPLANTABLE DEVICE: Type: IMPLANTABLE DEVICE | Site: CRANIAL | Status: FUNCTIONAL

## 2018-09-02 RX ORDER — POTASSIUM CHLORIDE 750 MG/1
20-40 TABLET, EXTENDED RELEASE ORAL
Status: DISCONTINUED | OUTPATIENT
Start: 2018-09-02 | End: 2018-09-02

## 2018-09-02 RX ORDER — SODIUM CHLORIDE, SODIUM LACTATE, POTASSIUM CHLORIDE, CALCIUM CHLORIDE 600; 310; 30; 20 MG/100ML; MG/100ML; MG/100ML; MG/100ML
INJECTION, SOLUTION INTRAVENOUS CONTINUOUS
Status: DISCONTINUED | OUTPATIENT
Start: 2018-09-02 | End: 2018-09-02 | Stop reason: HOSPADM

## 2018-09-02 RX ORDER — EPHEDRINE SULFATE 50 MG/ML
INJECTION, SOLUTION INTRAMUSCULAR; INTRAVENOUS; SUBCUTANEOUS PRN
Status: DISCONTINUED | OUTPATIENT
Start: 2018-09-02 | End: 2018-09-02

## 2018-09-02 RX ORDER — ONDANSETRON 4 MG/1
4 TABLET, ORALLY DISINTEGRATING ORAL EVERY 30 MIN PRN
Status: DISCONTINUED | OUTPATIENT
Start: 2018-09-02 | End: 2018-09-02 | Stop reason: HOSPADM

## 2018-09-02 RX ORDER — ONDANSETRON 2 MG/ML
INJECTION INTRAMUSCULAR; INTRAVENOUS PRN
Status: DISCONTINUED | OUTPATIENT
Start: 2018-09-02 | End: 2018-09-02

## 2018-09-02 RX ORDER — POTASSIUM CHLORIDE 29.8 MG/ML
20 INJECTION INTRAVENOUS
Status: DISCONTINUED | OUTPATIENT
Start: 2018-09-02 | End: 2018-09-02

## 2018-09-02 RX ORDER — ONDANSETRON 4 MG/1
4-8 TABLET, ORALLY DISINTEGRATING ORAL EVERY 6 HOURS PRN
Status: DISCONTINUED | OUTPATIENT
Start: 2018-09-02 | End: 2018-09-02

## 2018-09-02 RX ORDER — OXYCODONE HYDROCHLORIDE 5 MG/1
5-10 TABLET ORAL
Status: DISCONTINUED | OUTPATIENT
Start: 2018-09-02 | End: 2018-09-03

## 2018-09-02 RX ORDER — NORGESTIMATE AND ETHINYL ESTRADIOL 0.25-0.035
1 KIT ORAL DAILY
COMMUNITY

## 2018-09-02 RX ORDER — CEFAZOLIN SODIUM 1 G/50ML
1250 SOLUTION INTRAVENOUS EVERY 8 HOURS
Status: DISCONTINUED | OUTPATIENT
Start: 2018-09-02 | End: 2018-09-02

## 2018-09-02 RX ORDER — NALOXONE HYDROCHLORIDE 0.4 MG/ML
.1-.4 INJECTION, SOLUTION INTRAMUSCULAR; INTRAVENOUS; SUBCUTANEOUS
Status: DISCONTINUED | OUTPATIENT
Start: 2018-09-02 | End: 2018-09-02

## 2018-09-02 RX ORDER — ACETAMINOPHEN 325 MG/1
650 TABLET ORAL EVERY 4 HOURS PRN
Status: DISCONTINUED | OUTPATIENT
Start: 2018-09-05 | End: 2018-09-02

## 2018-09-02 RX ORDER — MAGNESIUM SULFATE HEPTAHYDRATE 40 MG/ML
4 INJECTION, SOLUTION INTRAVENOUS EVERY 4 HOURS PRN
Status: DISCONTINUED | OUTPATIENT
Start: 2018-09-02 | End: 2018-09-02

## 2018-09-02 RX ORDER — POTASSIUM CHLORIDE 7.45 MG/ML
10 INJECTION INTRAVENOUS
Status: DISCONTINUED | OUTPATIENT
Start: 2018-09-02 | End: 2018-09-02

## 2018-09-02 RX ORDER — LIDOCAINE HYDROCHLORIDE 20 MG/ML
INJECTION, SOLUTION INFILTRATION; PERINEURAL PRN
Status: DISCONTINUED | OUTPATIENT
Start: 2018-09-02 | End: 2018-09-02

## 2018-09-02 RX ORDER — METOCLOPRAMIDE 5 MG/1
10 TABLET ORAL EVERY 6 HOURS PRN
Status: DISCONTINUED | OUTPATIENT
Start: 2018-09-02 | End: 2018-09-12 | Stop reason: HOSPADM

## 2018-09-02 RX ORDER — POTASSIUM CL/LIDO/0.9 % NACL 10MEQ/0.1L
10 INTRAVENOUS SOLUTION, PIGGYBACK (ML) INTRAVENOUS
Status: DISCONTINUED | OUTPATIENT
Start: 2018-09-02 | End: 2018-09-02

## 2018-09-02 RX ORDER — HYDRALAZINE HYDROCHLORIDE 20 MG/ML
10-20 INJECTION INTRAMUSCULAR; INTRAVENOUS EVERY 30 MIN PRN
Status: DISCONTINUED | OUTPATIENT
Start: 2018-09-02 | End: 2018-09-12 | Stop reason: HOSPADM

## 2018-09-02 RX ORDER — METOCLOPRAMIDE HYDROCHLORIDE 5 MG/ML
10 INJECTION INTRAMUSCULAR; INTRAVENOUS EVERY 6 HOURS PRN
Status: DISCONTINUED | OUTPATIENT
Start: 2018-09-02 | End: 2018-09-12 | Stop reason: HOSPADM

## 2018-09-02 RX ORDER — LABETALOL HYDROCHLORIDE 5 MG/ML
10-40 INJECTION, SOLUTION INTRAVENOUS EVERY 10 MIN PRN
Status: DISCONTINUED | OUTPATIENT
Start: 2018-09-02 | End: 2018-09-12 | Stop reason: HOSPADM

## 2018-09-02 RX ORDER — FENTANYL CITRATE 50 UG/ML
INJECTION, SOLUTION INTRAMUSCULAR; INTRAVENOUS PRN
Status: DISCONTINUED | OUTPATIENT
Start: 2018-09-02 | End: 2018-09-02

## 2018-09-02 RX ORDER — CEFTRIAXONE 2 G/1
2 INJECTION, POWDER, FOR SOLUTION INTRAMUSCULAR; INTRAVENOUS EVERY 12 HOURS
Status: DISCONTINUED | OUTPATIENT
Start: 2018-09-02 | End: 2018-09-04

## 2018-09-02 RX ORDER — SODIUM CHLORIDE 9 MG/ML
INJECTION, SOLUTION INTRAVENOUS CONTINUOUS
Status: DISCONTINUED | OUTPATIENT
Start: 2018-09-02 | End: 2018-09-02

## 2018-09-02 RX ORDER — CEFAZOLIN SODIUM 1 G/50ML
1250 SOLUTION INTRAVENOUS EVERY 8 HOURS
Status: DISCONTINUED | OUTPATIENT
Start: 2018-09-03 | End: 2018-09-04

## 2018-09-02 RX ORDER — POTASSIUM CHLORIDE 1.5 G/1.58G
20-40 POWDER, FOR SOLUTION ORAL
Status: DISCONTINUED | OUTPATIENT
Start: 2018-09-02 | End: 2018-09-02

## 2018-09-02 RX ORDER — PROPOFOL 10 MG/ML
INJECTION, EMULSION INTRAVENOUS PRN
Status: DISCONTINUED | OUTPATIENT
Start: 2018-09-02 | End: 2018-09-02

## 2018-09-02 RX ORDER — ONDANSETRON 2 MG/ML
4 INJECTION INTRAMUSCULAR; INTRAVENOUS EVERY 30 MIN PRN
Status: DISCONTINUED | OUTPATIENT
Start: 2018-09-02 | End: 2018-09-02 | Stop reason: HOSPADM

## 2018-09-02 RX ORDER — ACETAMINOPHEN 325 MG/1
975 TABLET ORAL EVERY 8 HOURS
Status: DISCONTINUED | OUTPATIENT
Start: 2018-09-02 | End: 2018-09-04

## 2018-09-02 RX ORDER — GADOBUTROL 604.72 MG/ML
0.1 INJECTION INTRAVENOUS ONCE
Status: COMPLETED | OUTPATIENT
Start: 2018-09-02 | End: 2018-09-02

## 2018-09-02 RX ORDER — HYDROMORPHONE HYDROCHLORIDE 1 MG/ML
.3-.5 INJECTION, SOLUTION INTRAMUSCULAR; INTRAVENOUS; SUBCUTANEOUS EVERY 5 MIN PRN
Status: DISCONTINUED | OUTPATIENT
Start: 2018-09-02 | End: 2018-09-02 | Stop reason: HOSPADM

## 2018-09-02 RX ORDER — CEFAZOLIN SODIUM 2 G/100ML
2 INJECTION, SOLUTION INTRAVENOUS
Status: DISCONTINUED | OUTPATIENT
Start: 2018-09-02 | End: 2018-09-02 | Stop reason: HOSPADM

## 2018-09-02 RX ORDER — LIDOCAINE 40 MG/G
CREAM TOPICAL
Status: DISCONTINUED | OUTPATIENT
Start: 2018-09-02 | End: 2018-09-02 | Stop reason: HOSPADM

## 2018-09-02 RX ORDER — FENTANYL CITRATE 50 UG/ML
25-50 INJECTION, SOLUTION INTRAMUSCULAR; INTRAVENOUS
Status: DISCONTINUED | OUTPATIENT
Start: 2018-09-02 | End: 2018-09-02 | Stop reason: HOSPADM

## 2018-09-02 RX ORDER — MAGNESIUM SULFATE HEPTAHYDRATE 40 MG/ML
2 INJECTION, SOLUTION INTRAVENOUS DAILY PRN
Status: DISCONTINUED | OUTPATIENT
Start: 2018-09-02 | End: 2018-09-02

## 2018-09-02 RX ORDER — ONDANSETRON 2 MG/ML
4 INJECTION INTRAMUSCULAR; INTRAVENOUS EVERY 6 HOURS PRN
Status: DISCONTINUED | OUTPATIENT
Start: 2018-09-02 | End: 2018-09-02

## 2018-09-02 RX ORDER — CEFAZOLIN SODIUM 1 G/3ML
1 INJECTION, POWDER, FOR SOLUTION INTRAMUSCULAR; INTRAVENOUS SEE ADMIN INSTRUCTIONS
Status: DISCONTINUED | OUTPATIENT
Start: 2018-09-02 | End: 2018-09-02 | Stop reason: HOSPADM

## 2018-09-02 RX ADMIN — OXYCODONE HYDROCHLORIDE 5 MG: 5 TABLET ORAL at 17:24

## 2018-09-02 RX ADMIN — OXYCODONE HYDROCHLORIDE 5 MG: 5 TABLET ORAL at 02:30

## 2018-09-02 RX ADMIN — SODIUM CHLORIDE, POTASSIUM CHLORIDE, SODIUM LACTATE AND CALCIUM CHLORIDE: 600; 310; 30; 20 INJECTION, SOLUTION INTRAVENOUS at 07:56

## 2018-09-02 RX ADMIN — Medication 1125 MG: at 19:53

## 2018-09-02 RX ADMIN — PHENYLEPHRINE HYDROCHLORIDE 100 MCG: 10 INJECTION, SOLUTION INTRAMUSCULAR; INTRAVENOUS; SUBCUTANEOUS at 08:15

## 2018-09-02 RX ADMIN — VANCOMYCIN HYDROCHLORIDE 2500 MG: 10 INJECTION, POWDER, LYOPHILIZED, FOR SOLUTION INTRAVENOUS at 17:26

## 2018-09-02 RX ADMIN — OXYCODONE HYDROCHLORIDE 5 MG: 5 TABLET ORAL at 15:44

## 2018-09-02 RX ADMIN — LIDOCAINE HYDROCHLORIDE 50 MG: 20 INJECTION, SOLUTION INFILTRATION; PERINEURAL at 08:01

## 2018-09-02 RX ADMIN — ACETAMINOPHEN 650 MG: 325 TABLET, FILM COATED ORAL at 07:01

## 2018-09-02 RX ADMIN — ACETAMINOPHEN 650 MG: 325 TABLET, FILM COATED ORAL at 02:30

## 2018-09-02 RX ADMIN — OXYCODONE HYDROCHLORIDE 5 MG: 5 TABLET ORAL at 19:53

## 2018-09-02 RX ADMIN — PHENYLEPHRINE HYDROCHLORIDE 100 MCG: 10 INJECTION, SOLUTION INTRAMUSCULAR; INTRAVENOUS; SUBCUTANEOUS at 08:31

## 2018-09-02 RX ADMIN — ACETAMINOPHEN 975 MG: 325 TABLET, FILM COATED ORAL at 11:38

## 2018-09-02 RX ADMIN — SUGAMMADEX 200 MG: 100 INJECTION, SOLUTION INTRAVENOUS at 09:25

## 2018-09-02 RX ADMIN — FENTANYL CITRATE 25 MCG: 50 INJECTION INTRAMUSCULAR; INTRAVENOUS at 10:18

## 2018-09-02 RX ADMIN — LEVOTHYROXINE SODIUM 75 MCG: 75 TABLET ORAL at 07:01

## 2018-09-02 RX ADMIN — PROPOFOL 180 MG: 10 INJECTION, EMULSION INTRAVENOUS at 08:01

## 2018-09-02 RX ADMIN — ACETAMINOPHEN 975 MG: 325 TABLET, FILM COATED ORAL at 19:52

## 2018-09-02 RX ADMIN — CEFTRIAXONE SODIUM 2 G: 2 INJECTION, POWDER, FOR SOLUTION INTRAMUSCULAR; INTRAVENOUS at 15:45

## 2018-09-02 RX ADMIN — PHENYLEPHRINE HYDROCHLORIDE 100 MCG: 10 INJECTION, SOLUTION INTRAMUSCULAR; INTRAVENOUS; SUBCUTANEOUS at 08:39

## 2018-09-02 RX ADMIN — OXYCODONE HYDROCHLORIDE 5 MG: 5 TABLET ORAL at 07:01

## 2018-09-02 RX ADMIN — Medication 1125 MG: at 07:00

## 2018-09-02 RX ADMIN — Medication 5 MG: at 08:52

## 2018-09-02 RX ADMIN — ROCURONIUM BROMIDE 50 MG: 10 INJECTION INTRAVENOUS at 08:01

## 2018-09-02 RX ADMIN — OXYCODONE HYDROCHLORIDE 10 MG: 5 TABLET ORAL at 23:10

## 2018-09-02 RX ADMIN — OXYCODONE HYDROCHLORIDE 5 MG: 5 TABLET ORAL at 12:58

## 2018-09-02 RX ADMIN — ROCURONIUM BROMIDE 10 MG: 10 INJECTION INTRAVENOUS at 09:00

## 2018-09-02 RX ADMIN — GADOBUTROL 8.8 ML: 604.72 INJECTION INTRAVENOUS at 14:36

## 2018-09-02 RX ADMIN — PHENYLEPHRINE HYDROCHLORIDE 100 MCG: 10 INJECTION, SOLUTION INTRAMUSCULAR; INTRAVENOUS; SUBCUTANEOUS at 08:34

## 2018-09-02 RX ADMIN — SODIUM CHLORIDE: 9 INJECTION, SOLUTION INTRAVENOUS at 02:30

## 2018-09-02 RX ADMIN — ONDANSETRON 4 MG: 2 INJECTION INTRAMUSCULAR; INTRAVENOUS at 09:11

## 2018-09-02 RX ADMIN — VANCOMYCIN HYDROCHLORIDE 1000 MG: 1 INJECTION, POWDER, LYOPHILIZED, FOR SOLUTION INTRAVENOUS at 08:56

## 2018-09-02 RX ADMIN — FENTANYL CITRATE 25 MCG: 50 INJECTION INTRAMUSCULAR; INTRAVENOUS at 10:27

## 2018-09-02 RX ADMIN — PHENYLEPHRINE HYDROCHLORIDE 100 MCG: 10 INJECTION, SOLUTION INTRAMUSCULAR; INTRAVENOUS; SUBCUTANEOUS at 08:45

## 2018-09-02 RX ADMIN — MIDAZOLAM 2 MG: 1 INJECTION INTRAMUSCULAR; INTRAVENOUS at 07:56

## 2018-09-02 RX ADMIN — FENTANYL CITRATE 100 MCG: 50 INJECTION, SOLUTION INTRAMUSCULAR; INTRAVENOUS at 08:01

## 2018-09-02 ASSESSMENT — VISUAL ACUITY
OU: NORMAL ACUITY
OU: NORMAL ACUITY
OU: BLURRED VISION
OU: NORMAL ACUITY
OU: BASELINE
OU: NORMAL ACUITY
OU: BASELINE
OU: BASELINE
OU: NORMAL ACUITY
OU: BLURRED VISION
OU: BASELINE
OU: NORMAL ACUITY

## 2018-09-02 ASSESSMENT — PAIN DESCRIPTION - DESCRIPTORS
DESCRIPTORS: CONSTANT;PRESSURE
DESCRIPTORS: ACHING

## 2018-09-02 ASSESSMENT — ACTIVITIES OF DAILY LIVING (ADL)
ADLS_ACUITY_SCORE: 11
ADLS_ACUITY_SCORE: 10

## 2018-09-02 NOTE — ANESTHESIA POSTPROCEDURE EVALUATION
Patient: Bandar Olmedo    Procedure(s):  Right Stealth guided external ventricular drain placement, and removal of ventriculoperitoneal shunt system - Wound Class: I-Clean  removal of ventriculoperitoneal shunt system - Wound Class: I-Clean    Diagnosis:Ventricular shunt infection  Diagnosis Additional Information: No value filed.    Anesthesia Type:  General    Note:  Anesthesia Post Evaluation    Patient location during evaluation: PACU  Patient participation: Able to fully participate in evaluation  Level of consciousness: awake and alert  Pain management: adequate  Airway patency: patent  Cardiovascular status: acceptable  Respiratory status: acceptable  Hydration status: acceptable  PONV: none             Last vitals:  Vitals:    09/02/18 0940 09/02/18 0955 09/02/18 1010   BP:  116/72 128/75   Pulse: 71     Resp: 14 18 18   Temp: 36.7  C (98  F)  36.7  C (98.1  F)   SpO2: 98% 97% 98%         Electronically Signed By: Yan Dial MD  September 2, 2018  10:17 AM

## 2018-09-02 NOTE — CONSULTS
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    Patient reports that she has a history of headache and migraine.  On 2018 she presented at her local hospital in [place], [place] with severe pain located at the top of her head, so severe that she fainted earlier that day.  She was transferred to [facility] on 2018 after MRI brain showed intraventricular cyst with mass effect and mild obstructive hydrocephalus.  While at [facility], she underwent EVD placement on 2018 in the right frontal region to control increasing ICP.  Postoperative course was complicated by seizures, started on levetiracetam and her wellbutrin dose was decreased.  She later underwent right parietal  shunt placement (Strata Valve II regular) and cranioplasty with titanium plate on 2018.  She was discharged home 2018 with medications including levetiracetam for seizure prophylaxis.  Wound site was clean on follow up visit 2018, CT demonstrated stable size of ventricles and cyst.  She visited the [place] ED on 2018 for headache, was diagnosed with migraine headache.  She visited the [place] ED on 2018 for severe headaches with neck stiffness, photophobia, swelling at the shunt site.  She was afebrile, MRI 2018 noted improved decompression of cavum velum interpositum cyst.  She was given a migraine cocktail and discharged home from the ED as there was low suspicion for  infection at that time.  She continued to have worsening headache at family medicine follow up appointment on 2018, was given toradol.    She fell down the stairs of her home on 2018, then presented to her local ED in [place], [place].  Patient did not remember the events surrounding the fall.  Per mother, patient was inappropriately taking her medications: EMS brought a home container of 20+ prescription medication bottles with some .  Mother noted that patient was more forgetful than usual that day, unclear if there was a seizure prompting the fall.  Exam was notable for patient drowsiness and small purulent drainage at the surgical wound site on her scalp, no active drainage was noted after cleansing.  Lab was notable for leukocytosis (13.4) and imaging noted mild increase in ventricle size.  She was monitored overnight in the [place] ED, then mother was to bring her to [place] for follow-up on 2018.  Of note, there was scant purulent pink-tinged drainage from the surgical site on the morning of 2018.  She presented to the [place] ED on 2018 for neurosurgical follow-up.  She was started on vancomycin 2018 due to continued drainage from the occipital wound with gram-positive cocci collected at the wound site.  The patient was transferred here 2018 for further management.    On arrival to [facility], she underwent right EVD placement, removal of  shunt system and CSF cultures sent. Of note, purulent fluid drained from occipital shunt with cultures showing heavy growth of staph aureus.     ROS:   A 10-point ROS was performed as per HPI.     Past Medical History:   Hypothyroidism  Unspecified mood disorder / depression  Arachnoid cyst and hydrocephalus s/p ventriculoperitoneal shunt placed 2018    Medications:    Wellbutrin 200 mg daily    Klonopin 1 mg TID prn    Prozac 60 mg daily    Keppra 750 mg BID    Ambien 10 mg prn for sleep    Synthroid 75 mcg daily    Voltaren  50 mg TID prn for pain    Tylenol prn     Allergies: No Known Allergies  Family History:  Family history reviewed with patient and is noncontributory.  Social History:  I have reviewed this patient's social history and updated it with pertinent information if needed.     24 Hour Vital Signs Summary:  Temperatures:  Current - Temp: 98.5  F (36.9  C); Max - Temp  Av.2  F (36.8  C)  Min: 97.4  F (36.3  C)  Max: 98.8  F (37.1  C)  Respiration range: Resp  Av.3  Min: 14  Max: 18  Pulse range: Pulse  Av  Min: 71  Max: 75  Blood pressure range: Systolic (24hrs), Av , Min:103 , Max:129   ; Diastolic (24hrs), Av, Min:49, Max:76  Pulse oximetry range: SpO2  Av.5 %  Min: 94 %  Max: 98 %  Resp: 18    Intake/Output Summary (Last 24 hours) at 18 1329  Last data filed at 18 1300   Gross per 24 hour   Intake          1003.75 ml   Output             1510 ml   Net          -506.25 ml   BP - Mean:  [] 87    Current Medications:  Current Facility-Administered Medications   Medication     acetaminophen (TYLENOL) tablet 650 mg     acetaminophen (TYLENOL) tablet 975 mg     buPROPion (WELLBUTRIN SR) 12 hr tablet 200 mg     cefTRIAXone (ROCEPHIN) 2 g vial to attach to  ml bag for ADULTS or NS 50 ml bag for PEDS     FLUoxetine (PROzac) capsule 60 mg     hydrALAZINE (APRESOLINE) injection 10-20 mg     labetalol (NORMODYNE/TRANDATE) injection 10-40 mg     levETIRAcetam (KEPPRA) half-tab 1,125 mg     levothyroxine (SYNTHROID/LEVOTHROID) tablet 75 mcg     lidocaine (LMX4) cream     lidocaine 1 % 1 mL     magnesium sulfate 2 g in NS intermittent infusion ([facility]ar[employer] or [facility] Cmpd)     magnesium sulfate 4 g in 100 mL sterile water (premade)     metoclopramide (REGLAN) tablet 10 mg    Or     metoclopramide (REGLAN) injection 10 mg     naloxone (NARCAN) injection 0.1-0.4 mg     ondansetron (ZOFRAN-ODT) ODT tab 4-8 mg    Or     ondansetron (ZOFRAN) injection 4-8 mg     oxyCODONE IR (ROXICODONE) tablet  "5 mg     oxyCODONE IR (ROXICODONE) tablet 5-10 mg     potassium chloride (KLOR-CON) Packet 20-40 mEq     potassium chloride 10 mEq in 100 mL sterile water intermittent infusion (premix)     potassium chloride 20 mEq in 50 mL intermittent infusion     potassium chloride SA (K-DUR/KLOR-CON M) CR tablet 20-40 mEq     potassium phosphate 10 mmol in D5W 250 mL intermittent infusion     potassium phosphate 15 mmol in D5W 250 mL intermittent infusion     potassium phosphate 20 mmol in D5W 250 mL intermittent infusion     potassium phosphate 20 mmol in D5W 500 mL intermittent infusion     potassium phosphate 25 mmol in D5W 500 mL intermittent infusion     prochlorperazine (COMPAZINE) injection 10 mg    Or     prochlorperazine (COMPAZINE) tablet 10 mg     sodium chloride (PF) 0.9% PF flush 3 mL     sodium chloride (PF) 0.9% PF flush 3 mL     sodium chloride 0.9% infusion     sodium chloride 0.9% infusion     vancomycin (VANCOCIN) 1,250 mg in sodium chloride 0.9 % 250 mL intermittent infusion     vancomycin (VANCOCIN) 2,500 mg in sodium chloride 0.9 % 500 mL intermittent infusion     Physical Examination:  /76  Pulse 71  Temp 98.5  F (36.9  C) (Oral)  Resp 18  Ht 1.803 m (5' 11\")  Wt 88.2 kg (194 lb 6.4 oz)  SpO2 97%  BMI 27.11 kg/m2  General: NAD, lying in bed  HEENT: eyes nonicteric, dressings in place on scalp  CVS: extremities well perfused.  Resp: Symmetric respirations. No use of accessory muscles.  Abd: Soft, nondistended   Extremities: no edema  Neurological Examination  Mentation - alert, attentive, oriented to person, follows commands, speech intact and clear, tends to forget that she is not in [place] or [place].  Cranial nerves - Left homonymous hemianopia with macular sparing.  EOMI.  Facial expressions symmetric.  Hearing intact to conversation. No dysarthria.  Palate elevation symmetric.  Strong shoulder shrug bilaterally.  Tongue midline.  Motor exam - Strength 5/5 throughout.  No abnormal " movements noted.  Reflexes - 2+ and symmetric at biceps, triceps, brachioradialis, patellar, achilles.  Sensation - intact to light touch in all four extremities.  Coordination - finger-nose-finger and heel-shin without dysmetria bilaterally.  Gait - not assessed    Labs/Studies:  Recent Labs   Lab Test  09/02/18   0710   NA  137   POTASSIUM  4.1   CHLORIDE  105   CO2  23   ANIONGAP  10   GLC  78   BUN  5*   CR  0.76   EDUIN  9.0   WBC  8.7   RBC  3.46*   HGB  10.6*   PLT  494*     Recent Labs   Lab Test  09/02/18   0710   INR  1.12     Recent Labs  Lab 09/02/18  0737 09/02/18  0710   GLC  --  78   BGM 78  --    All cultures:  Recent Labs  Lab 09/02/18  0914 09/02/18  0909 09/02/18  0903 09/02/18  0854 09/01/18  2330 09/01/18  2205 09/01/18  2156   CULT Canceled, Test credited  Incorrectly ordered by PCU/Clinic  Test reordered as correct code  SEE MISCC CULTURE. ME PENDING  PENDING  PENDING PENDING  PENDING  PENDING PENDING  PENDING  PENDING Heavy growthStaphylococcus aureusSusceptibility testing in progress*  Critical Value/Significant Value called to and read back byHalina Shane RN at 1155 on 9.2.18 kln. No growth after 14 hours No growth after 14 hours     Imaging:   [facility] 9/2/18 -  Right occipital approach ventriculostomy catheter with tip above the left lateral ventricle in brain parenchyma. Geographic hypodensity representing vasogenic edema involving predominantly the white matter in the right occipitoparietal distribution increased since 8/14/18. Mass effect and effacement of the right occipital horn  MRI brain 9/2/18 - final read pending    Assessment/Plan:  43 year old right-handed lady with past medical history including vellum interpositum intracranial arachnoid cyst s/p EVD placement 7/12/2018 complicated by seizure and ventriculoperitoneal shunt placement 7/18/2018, unspecified mood disorder, hypothyroidism who was transferred here 9/1/2018 from [place] after a fall at home associated with  persisting headache and purulent drainage at occipital ventriculoperitoneal shunt surgical site.    Neuro:  #Shunt infection -  shunt removed, EVD placed   -  shunt removed, EVD placed on 9/2/2018.  - Noted to have purulent fluid from  shunt, sent for cultures  - Continue to monitor EVD and ICPs  - MRI brain    #Gram-positive cocci in CSF  - Heavy growth of Staph aureus in CSF culture noted  - See plan in ID below    #Hydrocephalus s/p EVD  - H/o arachnoid cyst s/p  shunt placement  -  shunt removed, EVD placed  - EVD @ 15    #Seizure  - First seizure post-op 7/12/2018.    - Started on keppra 750 BID at that time  - Unclear if fall on 8/30/2018 was secondary to breakthrough seizure.  - Continue keppra for now, if patient has worsening mentation then consider EEG monitoring.    #Chronic headache  - PRN tylenol and oxy    #Depression  - Wellbutrin 200 at home, recommend to stop medication (decreased seizure threshold)  - Continue prozac at home dose.  - Takes Klonopin PRN for anxiety, monitor closely for withdrawal seizures.    Resp:   - Stable on room air  - continuous pulse ox monitoring   - maintain O2 saturations > 92%     CV:    - No issues  - SBP goal: MAP>65    Renal/FEN:   - strict monitoring of I/O  - NS@ 75 ml/hr    Endo:   - No current concerns    Heme:   - No leucocytosis noted  - Hg > 7.0   - Plt > 100k   - INR <1.5     GI:   - No current concerns  - Regular diet    ID:   #Shunt infection with Staph aureus in CSF  - CSF culture 9/1/2018 2330 shows heavy growth of Staph aureus  - Started on CNS dosing of vancomycin and rocephin  - Infectious disease consulted  - Will follow up final cultures    FEN: Regular diet  PPx:- SCDs   Code: Full Code    Heather Steel MD  Vascular Neurology fellow  Pager # [phone]

## 2018-09-02 NOTE — PHARMACY-VANCOMYCIN DOSING SERVICE
Pharmacy Vancomycin Initial Note  Date of Service 2018  Patient's  1975  43 year old, female    Indication: Cerebritis    Current estimated CrCl = Estimated Creatinine Clearance: 114.8 mL/min (based on Cr of 0.76).    Creatinine for last 3 days  2018:  7:10 AM Creatinine 0.76 mg/dL    Recent Vancomycin Level(s) for last 3 days  No results found for requested labs within last 72 hours.      Vancomycin IV Administrations (past 72 hours)                   vancomycin vial 1000 mg (mg) 1,000 mg Given 18 0856                Nephrotoxins and other renal medications (Future)    Start     Dose/Rate Route Frequency Ordered Stop    18 2200  vancomycin (VANCOCIN) 1,250 mg in sodium chloride 0.9 % 250 mL intermittent infusion      1,250 mg  over 90 Minutes Intravenous EVERY 8 HOURS 18 1343      18 1345  vancomycin (VANCOCIN) 2,500 mg in sodium chloride 0.9 % 500 mL intermittent infusion      2,500 mg  over 2 Hours Intravenous ONCE 18 1342            Contrast Orders - past 72 hours     None                Plan:  1.  Start vancomycin  2500 mg (28mg/kg) x 1 followed by 1250 mg IV q8H.   2.  Goal Trough Level: ~ 20  3.  Pharmacy will check trough levels as appropriate in 1-3 Days.    4. Serum creatinine levels will be ordered daily for the first week of therapy and at least twice weekly for subsequent weeks.    5. [place] method utilized to dose vancomycin therapy: Method 2    Reese PeraltaD  Antimicrobial Stewardship & Infectious Diseases Pharmacist  Office Ph: [phone]  Pager: [phone]
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
## 2018-09-02 NOTE — PROCEDURES
NEUROSURGERY SHUNT TAP PROCEDURE NOTE    Date: September 2, 2018    Pre-procedure diagnosis: Shunt infection    Post-procedure diagnosis: Shunt infection    EBL: < 1cc    Surgeon: Vikram Dunlap MD    Indication for procedure:  1. Assess for shunt malfunction  2. Assess for shunt infection    Procedure:  The patient's shunt was identified and palpable. The shunt area was prepped and draped in normal sterile fashion. A 23 gauge butterfly needle was inserted into the shunt reservoir. CSF did not freely flow, so gentle suction applied to the butterfly needle tubing. Very minimal CSF returned. The CSF was turbid appearing. A prefilled manometer was attached to the butterfly tubing. The distal tubing was occluded, no flow was noted. The proximal tubing was occluded with pressure and no flow distally was noted. The butterfly needle was removed. The patient tolerated the procedure well.

## 2018-09-02 NOTE — ANESTHESIA PREPROCEDURE EVALUATION
Anesthesia Evaluation     . Pt has had prior anesthetic. Type: General           ROS/MED HX    ENT/Pulmonary:  - neg pulmonary ROS     Neurologic:     (+)other neuro subarachnoid cyst with  shunt    Cardiovascular:     (+) ----. : . . . :. . Previous cardiac testing date:results:date: results:ECG reviewed date:9/1/2018 results:Normal sinus date: results:          METS/Exercise Tolerance:     Hematologic:  - neg hematologic  ROS       Musculoskeletal:  - neg musculoskeletal ROS       GI/Hepatic:  - neg GI/hepatic ROS       Renal/Genitourinary:  - ROS Renal section negative       Endo:  - neg endo ROS       Psychiatric:     (+) psychiatric history       Infectious Disease: Comment: Possible shunt infection - gram positive cocci from culture        Malignancy:      - no malignancy   Other:                     Physical Exam  Normal systems: cardiovascular, pulmonary and dental    Airway   Mallampati: II  TM distance: >3 FB  Neck ROM: full    Dental     Cardiovascular       Pulmonary                     Anesthesia Plan      History & Physical Review  History and physical reviewed and following examination; no interval change.    ASA Status:  3 emergent.        Plan for General with Propofol induction. Maintenance will be Balanced.    PONV prophylaxis:  Ondansetron (or other 5HT-3) and Dexamethasone or Solumedrol  Additional equipment: 2nd IV and Arterial Line      Postoperative Care  Postoperative pain management:  Multi-modal analgesia.      Consents        ANESTHESIA PREOP EVALUATION    Procedure: Procedure(s):  Right Stealth guided external ventricular drain and removal - Wound Class: I-Clean   - Wound Class: I-Clean   - Wound Class: II-Clean Contaminated    HPI: Bandar Olmedo is a 43 year old female presenting for above procedure.    PMHx/PSHx/ROS:  No past medical history on file.    No past surgical history on file.      Past Anes Hx: No personal or family h/o anesthesia problems    Soc Hx:   Social History  "  Substance Use Topics     Smoking status: Not on file     Smokeless tobacco: Not on file     Alcohol use Not on file       Allergies: No Known Allergies    Meds:   Prescriptions Prior to Admission   Medication Sig Dispense Refill Last Dose     Acetaminophen (TYLENOL PO) Take 500 mg by mouth every 6 hours as needed for mild pain or fever        BuPROPion HCl (WELLBUTRIN SR PO) Take 200 mg by mouth daily        ClonazePAM (KLONOPIN PO) Take 1 mg by mouth 3 times daily as needed for anxiety        Diclofenac Sodium (VOLTAREN PO) Take 50 mg by mouth 3 times daily as needed for moderate pain        FLUoxetine HCl (PROZAC PO) Take 60 mg by mouth daily        LevETIRAcetam (KEPPRA PO) Take 750 mg by mouth 2 times daily        Levothyroxine Sodium (SYNTHROID PO) Take 75 mcg by mouth daily        norgestimate-ethinyl estradiol (ORTHO-CYCLEN, SPRINTEC) 0.25-35 MG-MCG per tablet Take 1 tablet by mouth daily        Zolpidem Tartrate (AMBIEN PO) Take 10 mg by mouth nightly as needed for sleep          No current outpatient prescriptions on file.       Physical Exam:  Vitals: /49 (BP Location: Left arm)  Pulse 73  Temp 36.3  C (97.4  F) (Oral)  Resp 16  Ht 1.803 m (5' 11\")  Wt 88.2 kg (194 lb 6.4 oz)  SpO2 94%  BMI 27.11 kg/m2  BMI= Body mass index is 27.11 kg/(m^2).      Labs:  UPT: No results found for: HCGQUANT      BMP:  No results for input(s): NA, POTASSIUM, CHLORIDE, CO2, BUN, CR, GLC, EDUIN in the last 87686 hours.  CBC:   No results for input(s): WBC, RBC, HGB, HCT, MCV, MCH, MCHC, RDW, PLT in the last 92022 hours.  Coags:  No results for input(s): INR, PTT, FIBR in the last 99875 hours.    Assessment/Plan:  - ASA 4E  - GETA with standard ASA monitors, IV induction, balanced anesthetic        Erick Rogel Jr., MD  Anesthesia Resident - CA3  Pager: [phone]  9/2/2018  7:09 AM      Agree with the above anesthetic plan    Yan Dial MD  Staff Anesthesiologist  [phone]    "

## 2018-09-02 NOTE — PLAN OF CARE
Problem: Patient Care Overview  Goal: Plan of Care/Patient Progress Review  OT/6A: OT orders acknowledged. Pt to OR this date for EVD placement and removal of shunt. Will cancel and follow up post op. Pt rescheduled for 9/3/18.

## 2018-09-02 NOTE — OR NURSING
"Pt meeting phase one completion criteria @ 1040.  Alert and oriented times 4; neuro checks appear intact with exception of \"some blurry vision\"  second to pt not wearing her contacts.   Right sided head pain rating as \"tolerable\".   Lungs cont equal and clear bi lat.  Tolerating wean to  1 liter nasal cannula.  Right head dressing intact -lower dressing with some shadowing increase. Shadowing increase is  Minimal to this area shadowing again outlined.  Auto Held medications in EPIC EMAR were communicated to receiving unit RN.  Pt placed on Capnography monitor with Zoll for transfer to ICU.   SBAR Hand off report to Halina Vega RN- report including pt;s EVD is clamped as per MD Neri's instructions and ICP monitoring not ordered while in PACU per Daron - RN to address with Neuro team again on pt's arrival to ICU.         "

## 2018-09-02 NOTE — H&P
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 She had a right frontal EVD placed. Later, the had a right occipital shunt (Medtronic Strata II - unknown setting) placed by Dr. Shipman. While passing the catheter, the cyst was fenestrated. The patient's imaging improved, but she complained of ongoing headaches and abdominal discomfort.    The patient returned to the ED for evaluation following a fall 8/31. Her family stated that the patient was not acting like herself. She continued to complain of frontal headaches, now with tenderness over the  shunt site. The circumstances of the fall are unclear. The patient reportedly had a first time seizure following her shunt series, rising the question of a seizure. Her family also raised the concern that she is taking 20+ medications at home and given her confusion, is likely not taking them correctly. The patient has not recollection of the event.    While in the ED, the patient described some purulent appearing discharge from her surgical site. The site was cleaned. Another provider felt there was pink-emerson discharge from the incision the next day. When examined by Neurosurgery, no incision concerns were evident. She was afebrile, WBC 13.4, SED 44, CRP 8.4. No additional workup was performed that the patient recalls.    After arriving at [facility], the patient complains of ongoing HAs. She denies neck stiffness or fevers. Endorses decreased visual acuity.    Past Medical History  Depression  Arachnoid cyst  Hydrocephalus    Past Surgical History  Right occipital peritoneal shunt    Family History  family history is not on file.    Social History  - Occupation:   - Marital Status: not       Medications  Extensive list of PTA medications  Prozac  Wellbutrin  Keppra     Allergies  Allergies not on file    ROS: 10 point ROS of systems were all negative except for pertinent positives noted in HPI.    PE:  Blood pressure 129/56, pulse 73, temperature 98.8  F (37.1  C), temperature source Oral, resp. rate 16, SpO2 98 %.  NEUROLOGIC:  -- Awake; Alert; oriented x 3; easily confused  -- Follows commands briskly  -- Speech fluent, spontaneous. No aphasia or dysarthria.  -- No gaze preference. No apparent hemineglect.  Cranial Nerves:  -- Visual fields full, PERRL 3-2mm bilat and brisk, extraocular movements intact  -- Face symmetrical, tongue midline  -- Sensory V1-V3 intact bilaterally  -- Palate elevates symmetrically, uvula midline  -- Hearing grossly intact bilat  -- Trapezii 5/5 strength bilat symmetric  -- Cerebellar: Finger nose finger without dysmetria    Motor:  Normal bulk / tone; no tremor, rigidity, or bradykinesia.    No Pronator Drift     Delt Bi Tri FE IP Quad Hamst TibAnt EHL Gastroc    C5 C6 C7 C8/T1 L2 L3 L4-S1 L4 L5 S1   R 5 5 5 5 5 5 5 5 5 5   L 5 5 5 5 5 5 5 5 5 5     Sensory:  intact to LT    Reflexes:     Bi Tri BR Neeraj Pat Ach Bab    C5-6 C7-8 C6 UMN L2-4 S1 UMN   R 2+ 2+ 2+ Norm 2+ 2+ Norm   L 2+ 2+ 2+ Norm 2+ 2+ Norm     Gait: Deferred    Incisions: Right occipital incision has area of poor healing along inferior edge. Purulent material appearing to swell up from this area after incision cleaned. Tender. No erythema. Underlying fluctuance present. Abdominal incision unremarkable.      Assessment: Bandar Olmedo is a 43 year old female with possible shunt infection vs malfunction.      Plan:  - Shunt tap  - Add on for OR 9/2  - Pre-op labs  - Stealth head CT  - NPO  - mIVF while NPO  - Will hold on antibiotics until good sample can be sent to lab  - Serial neuro exams  - SCDs  - Continue PTA keppra, zoloft, wellbutrin      The patient was discussed with the neurosurgery chief  resident, who agrees with the above stated plan.      Vikram Dunlap MD  Neurosurgery PGY3

## 2018-09-02 NOTE — BRIEF OP NOTE
[facility], [place]    Brief Operative Note    Pre-operative diagnosis: Ventricular shunt infection  Post-operative diagnosis Ventricular Shunt Infection  Procedure: Procedure(s):  Right Stealth guided external ventricular drain placement, and removal of ventriculoperitoneal shunt system - Wound Class: I-Clean  removal of ventriculoperitoneal shunt system - Wound Class: I-Clean  Surgeon: Surgeon(s) and Role:     * Antonio Salinas MD - Primary     * Dena Neri MD - Resident - Assisting     * Merlin Moore MD,PhD - Resident - Assisting  Anesthesia: General   Estimated blood loss: 5 cc  Drains: EVD   Local: None  Fluids: 700 ml crystalloid  UOP: None recorded  Specimens:   ID Type Source Tests Collected by Time Destination   1 : cerebrospinal fluuid Fluid Cranium ANAEROBIC BACTERIAL CULTURE, FLUID CULTURE AEROBIC BACTERIAL, FUNGUS CULTURE, GRAM STAIN Antonio Salinas MD 9/2/2018  8:54 AM    2 : right occipital arik hole Fluid Cranium ANAEROBIC BACTERIAL CULTURE, FLUID CULTURE AEROBIC BACTERIAL, FUNGUS CULTURE, GRAM STAIN Antonio Salinas MD 9/2/2018  9:03 AM    3 : frontal cerebrospinal fluid Fluid Cranium ANAEROBIC BACTERIAL CULTURE, FLUID CULTURE AEROBIC BACTERIAL, FUNGUS CULTURE, GRAM STAIN Antonio Salinas MD 9/2/2018  9:09 AM    4 :  shunt tip Other (specify in comments) Cranium ANAEROBIC BACTERIAL CULTURE, FUNGUS CULTURE, GRAM STAIN, TISSUE CULTURE AEROBIC BACTERIAL Antonio Salinas MD 9/2/2018  9:14 AM      Findings:   Purulent fluid drained from occipital shunt, swabs sent, CSF from Right frontal EVD sent.   Complications: None.  Implants: Codman Bactiseal Ventricular Catheter.       PACU --> ICU  Post-op MRI brain w/contrast  Broad spectrum antibiotics   EVD open 15 cm H2O.       Contact the neurosurgery resident on call with questions.    Dial * * [phone]: Enter [phone] when prompted.   Merlin Moore MD, PhD  Neurosurgery PGY-4

## 2018-09-02 NOTE — PROGRESS NOTES
Admitted/transferred from: PACU  Reason for admission/transfer: ICP monitoring  Patient status upon admission/transfer: Stable  Interventions: EVD and ICP monitoring  Plan: MRI  2 RN skin assessment: completed by Barbara  Result of skin assessment and interventions/actions: Abrasion, bruises from fall at home  Height, weight, drug calc weight: done  Patient belongings: With patient  MDRO education (if applicable): NA

## 2018-09-02 NOTE — OR NURSING
"Pt arrived to PACU.  PACU Rn assumed care of pt @ 940.     Pt arrived able to state name and deny  nausea at present time.  Reports right sided headache - \"moderate\" level.  Lungs equal and clear bi lat; tolerating nasal cannula.  Neuro checks as per flowsheet - appear intact.   Additional assessment as per flowsheet.   MD Neuro surgery Shanon Neri to bedside with pt on arrival to pacu - he stated the EVD is to remain clamped and pt dose not require ICP monitoring.    "

## 2018-09-02 NOTE — PLAN OF CARE
Problem: Patient Care Overview  Goal: Plan of Care/Patient Progress Review  Outcome: No Change  Status: Pt admitted last evening from OSH s/t  shunt malfunction vs infection. Pt had shunt tap this shift; the CSF results were critical (see significant event note).   Neuros: A&Ox4, forgetful.   GI: NPO  : voiding ?  IV: IVMF 75cc/hr  Activity: up SBA  Pain: pt c/o HA managed with prn medications   Respiratory/Trach: LS clear; denies any SOB  Skin: old shunt incision to head with scant white drainage.   Social: pt mother and boyfriend updated last evening on pt status  Plan of care: plan to go to OR this morning for EVD placement and removal of shunt.     Overnight pt had pre-op scrub and shampoo. Pt had fiducial's placed and head CT at 0400 this morning.

## 2018-09-02 NOTE — PLAN OF CARE
Problem: Patient Care Overview  Goal: Plan of Care/Patient Progress Review  Outcome: No Change   09/01/18 1568   OTHER   Plan Of Care Reviewed With patient   Plan of Care Review   Progress no change     Temp: 98.3  F (36.8  C) Temp src: Oral BP: 115/63 Pulse: 75   Resp: 16 SpO2: 97 % O2 Device: None (Room air)       Patient is a new admit from OSH ( [facility] ) for shunt malfunction:    -alert and oriented x 4  -neuros intact  -C/O headache, relieved with Tylenol and Oxycodone  -urine sent to lab for urinalysis  -right posterior head incision MYRNA and slightly swollen  -left PIV saline locked  -up with SBA, patient had 2 episodes of fall    Plan: needs sputum sample.  Awaiting for more orders from MD.

## 2018-09-02 NOTE — ANESTHESIA CARE TRANSFER NOTE
Patient: Bandar Olmedo    Procedure(s):  Right Stealth guided external ventricular drain placement, and removal of ventriculoperitoneal shunt system - Wound Class: I-Clean  removal of ventriculoperitoneal shunt system - Wound Class: I-Clean    Diagnosis: Ventricular shunt infection  Diagnosis Additional Information: No value filed.    Anesthesia Type:   General     Note:  Airway :Face Mask  Patient transferred to:PACU  Handoff Report: Identifed the Patient, Identified the Reponsible Provider, Reviewed the pertinent medical history, Discussed the surgical course, Reviewed Intra-OP anesthesia mangement and issues during anesthesia, Set expectations for post-procedure period and Allowed opportunity for questions and acknowledgement of understanding      Vitals: (Last set prior to Anesthesia Care Transfer)    CRNA VITALS  9/2/2018 0905 - 9/2/2018 0942      9/2/2018             Resp Rate (set): 10                Electronically Signed By: SAMEERA Emery CRNA  September 2, 2018  9:42 AM

## 2018-09-02 NOTE — PLAN OF CARE
Problem: Patient Care Overview  Goal: Plan of Care/Patient Progress Review  6A-PT: CXL: PT consult received and appreciated. Pt in OR this a.m for EVD placement and shunt removal. CXL PT. Will continue to follow and initiate as appropriate.

## 2018-09-02 NOTE — PROVIDER NOTIFICATION
Lab notified floor of critical lab value in pt CSF sample: Gram + cocci, rare WBC's, many RBC's. NIELS Dunlap MD notified of results.

## 2018-09-03 ENCOUNTER — APPOINTMENT (OUTPATIENT)
Dept: CT IMAGING | Facility: CLINIC | Age: 43
End: 2018-09-03
Attending: STUDENT IN AN ORGANIZED HEALTH CARE EDUCATION/TRAINING PROGRAM
Payer: COMMERCIAL

## 2018-09-03 ENCOUNTER — APPOINTMENT (OUTPATIENT)
Dept: PHYSICAL THERAPY | Facility: CLINIC | Age: 43
End: 2018-09-03
Attending: STUDENT IN AN ORGANIZED HEALTH CARE EDUCATION/TRAINING PROGRAM
Payer: COMMERCIAL

## 2018-09-03 ENCOUNTER — APPOINTMENT (OUTPATIENT)
Dept: OCCUPATIONAL THERAPY | Facility: CLINIC | Age: 43
End: 2018-09-03
Attending: STUDENT IN AN ORGANIZED HEALTH CARE EDUCATION/TRAINING PROGRAM
Payer: COMMERCIAL

## 2018-09-03 LAB
ANION GAP SERPL CALCULATED.3IONS-SCNC: 6 MMOL/L (ref 3–14)
APPEARANCE CSF: ABNORMAL
BUN SERPL-MCNC: 4 MG/DL (ref 7–30)
CALCIUM SERPL-MCNC: 8.2 MG/DL (ref 8.5–10.1)
CHLORIDE SERPL-SCNC: 103 MMOL/L (ref 94–109)
CO2 SERPL-SCNC: 27 MMOL/L (ref 20–32)
COLOR CSF: YELLOW
CREAT SERPL-MCNC: 0.78 MG/DL (ref 0.52–1.04)
ERYTHROCYTE [DISTWIDTH] IN BLOOD BY AUTOMATED COUNT: 12.5 % (ref 10–15)
GFR SERPL CREATININE-BSD FRML MDRD: 80 ML/MIN/1.7M2
GLUCOSE CSF-MCNC: 38 MG/DL (ref 40–70)
GLUCOSE SERPL-MCNC: 79 MG/DL (ref 70–99)
GRAM STN SPEC: NORMAL
GRAM STN SPEC: NORMAL
HCT VFR BLD AUTO: 29.9 % (ref 35–47)
HGB BLD-MCNC: 9.6 G/DL (ref 11.7–15.7)
LYMPH ABN NFR CSF MANUAL: 56 %
MAGNESIUM SERPL-MCNC: 2 MG/DL (ref 1.6–2.3)
MCH RBC QN AUTO: 30.9 PG (ref 26.5–33)
MCHC RBC AUTO-ENTMCNC: 32.1 G/DL (ref 31.5–36.5)
MCV RBC AUTO: 96 FL (ref 78–100)
NEUTROPHILS NFR CSF MANUAL: 30 %
OTHER CELLS CSF: 14 %
PHOSPHATE SERPL-MCNC: 3.3 MG/DL (ref 2.5–4.5)
PLATELET # BLD AUTO: 431 10E9/L (ref 150–450)
POTASSIUM SERPL-SCNC: 3.8 MMOL/L (ref 3.4–5.3)
PROT CSF-MCNC: 79 MG/DL (ref 15–60)
RBC # BLD AUTO: 3.11 10E12/L (ref 3.8–5.2)
RBC # CSF MANUAL: 2080 /UL (ref 0–2)
SODIUM SERPL-SCNC: 136 MMOL/L (ref 133–144)
SPECIMEN SOURCE: NORMAL
TUBE # CSF: 1 #
WBC # BLD AUTO: 7.4 10E9/L (ref 4–11)
WBC # CSF MANUAL: 129 /UL (ref 0–5)

## 2018-09-03 PROCEDURE — 97530 THERAPEUTIC ACTIVITIES: CPT | Mod: GP

## 2018-09-03 PROCEDURE — 25000128 H RX IP 250 OP 636: Performed by: NEUROLOGICAL SURGERY

## 2018-09-03 PROCEDURE — 40000193 ZZH STATISTIC PT WARD VISIT

## 2018-09-03 PROCEDURE — 97530 THERAPEUTIC ACTIVITIES: CPT | Mod: GO

## 2018-09-03 PROCEDURE — 25000128 H RX IP 250 OP 636: Performed by: STUDENT IN AN ORGANIZED HEALTH CARE EDUCATION/TRAINING PROGRAM

## 2018-09-03 PROCEDURE — 25000132 ZZH RX MED GY IP 250 OP 250 PS 637: Performed by: STUDENT IN AN ORGANIZED HEALTH CARE EDUCATION/TRAINING PROGRAM

## 2018-09-03 PROCEDURE — 82945 GLUCOSE OTHER FLUID: CPT | Performed by: STUDENT IN AN ORGANIZED HEALTH CARE EDUCATION/TRAINING PROGRAM

## 2018-09-03 PROCEDURE — 87205 SMEAR GRAM STAIN: CPT | Performed by: STUDENT IN AN ORGANIZED HEALTH CARE EDUCATION/TRAINING PROGRAM

## 2018-09-03 PROCEDURE — 40000556 ZZH STATISTIC PERIPHERAL IV START W US GUIDANCE

## 2018-09-03 PROCEDURE — 80048 BASIC METABOLIC PNL TOTAL CA: CPT | Performed by: STUDENT IN AN ORGANIZED HEALTH CARE EDUCATION/TRAINING PROGRAM

## 2018-09-03 PROCEDURE — 20000004 ZZH R&B ICU UMMC

## 2018-09-03 PROCEDURE — 87075 CULTR BACTERIA EXCEPT BLOOD: CPT | Performed by: STUDENT IN AN ORGANIZED HEALTH CARE EDUCATION/TRAINING PROGRAM

## 2018-09-03 PROCEDURE — 83735 ASSAY OF MAGNESIUM: CPT | Performed by: STUDENT IN AN ORGANIZED HEALTH CARE EDUCATION/TRAINING PROGRAM

## 2018-09-03 PROCEDURE — 84100 ASSAY OF PHOSPHORUS: CPT | Performed by: STUDENT IN AN ORGANIZED HEALTH CARE EDUCATION/TRAINING PROGRAM

## 2018-09-03 PROCEDURE — 97535 SELF CARE MNGMENT TRAINING: CPT | Mod: GO

## 2018-09-03 PROCEDURE — 40000133 ZZH STATISTIC OT WARD VISIT

## 2018-09-03 PROCEDURE — 97161 PT EVAL LOW COMPLEX 20 MIN: CPT | Mod: GP

## 2018-09-03 PROCEDURE — 84157 ASSAY OF PROTEIN OTHER: CPT | Performed by: STUDENT IN AN ORGANIZED HEALTH CARE EDUCATION/TRAINING PROGRAM

## 2018-09-03 PROCEDURE — 87070 CULTURE OTHR SPECIMN AEROBIC: CPT | Performed by: STUDENT IN AN ORGANIZED HEALTH CARE EDUCATION/TRAINING PROGRAM

## 2018-09-03 PROCEDURE — 36415 COLL VENOUS BLD VENIPUNCTURE: CPT | Performed by: STUDENT IN AN ORGANIZED HEALTH CARE EDUCATION/TRAINING PROGRAM

## 2018-09-03 PROCEDURE — 25000125 ZZHC RX 250: Performed by: STUDENT IN AN ORGANIZED HEALTH CARE EDUCATION/TRAINING PROGRAM

## 2018-09-03 PROCEDURE — 97116 GAIT TRAINING THERAPY: CPT | Mod: GP

## 2018-09-03 PROCEDURE — 97165 OT EVAL LOW COMPLEX 30 MIN: CPT | Mod: GO

## 2018-09-03 PROCEDURE — 85027 COMPLETE CBC AUTOMATED: CPT | Performed by: STUDENT IN AN ORGANIZED HEALTH CARE EDUCATION/TRAINING PROGRAM

## 2018-09-03 PROCEDURE — 70450 CT HEAD/BRAIN W/O DYE: CPT

## 2018-09-03 RX ORDER — AMOXICILLIN 250 MG
2 CAPSULE ORAL 2 TIMES DAILY
Status: DISCONTINUED | OUTPATIENT
Start: 2018-09-03 | End: 2018-09-12 | Stop reason: HOSPADM

## 2018-09-03 RX ORDER — OXYCODONE HYDROCHLORIDE 5 MG/1
5 TABLET ORAL EVERY 4 HOURS PRN
Status: DISCONTINUED | OUTPATIENT
Start: 2018-09-03 | End: 2018-09-12 | Stop reason: HOSPADM

## 2018-09-03 RX ORDER — POLYETHYLENE GLYCOL 3350 17 G/17G
17 POWDER, FOR SOLUTION ORAL 3 TIMES DAILY
Status: DISCONTINUED | OUTPATIENT
Start: 2018-09-03 | End: 2018-09-12 | Stop reason: HOSPADM

## 2018-09-03 RX ADMIN — CEFTRIAXONE SODIUM 2 G: 2 INJECTION, POWDER, FOR SOLUTION INTRAMUSCULAR; INTRAVENOUS at 15:14

## 2018-09-03 RX ADMIN — FLUOXETINE 60 MG: 20 CAPSULE ORAL at 07:30

## 2018-09-03 RX ADMIN — POTASSIUM CHLORIDE 20 MEQ: 750 TABLET, EXTENDED RELEASE ORAL at 03:17

## 2018-09-03 RX ADMIN — ACETAMINOPHEN 975 MG: 325 TABLET, FILM COATED ORAL at 19:43

## 2018-09-03 RX ADMIN — CEFTRIAXONE SODIUM 2 G: 2 INJECTION, POWDER, FOR SOLUTION INTRAMUSCULAR; INTRAVENOUS at 03:17

## 2018-09-03 RX ADMIN — SENNOSIDES AND DOCUSATE SODIUM 1 TABLET: 8.6; 5 TABLET ORAL at 19:43

## 2018-09-03 RX ADMIN — OXYCODONE HYDROCHLORIDE 10 MG: 5 TABLET ORAL at 08:29

## 2018-09-03 RX ADMIN — Medication 1125 MG: at 07:30

## 2018-09-03 RX ADMIN — VANCOMYCIN HYDROCHLORIDE 1250 MG: 10 INJECTION, POWDER, LYOPHILIZED, FOR SOLUTION INTRAVENOUS at 08:30

## 2018-09-03 RX ADMIN — OXYCODONE HYDROCHLORIDE 5 MG: 5 TABLET ORAL at 19:44

## 2018-09-03 RX ADMIN — ACETAMINOPHEN 975 MG: 325 TABLET, FILM COATED ORAL at 03:17

## 2018-09-03 RX ADMIN — OXYCODONE HYDROCHLORIDE 10 MG: 5 TABLET ORAL at 11:11

## 2018-09-03 RX ADMIN — OXYCODONE HYDROCHLORIDE 10 MG: 5 TABLET ORAL at 15:13

## 2018-09-03 RX ADMIN — Medication 2 G: at 05:50

## 2018-09-03 RX ADMIN — Medication 1125 MG: at 19:46

## 2018-09-03 RX ADMIN — ACETAMINOPHEN 975 MG: 325 TABLET, FILM COATED ORAL at 11:11

## 2018-09-03 RX ADMIN — ONDANSETRON 4 MG: 2 INJECTION INTRAMUSCULAR; INTRAVENOUS at 05:12

## 2018-09-03 RX ADMIN — OXYCODONE HYDROCHLORIDE 5 MG: 5 TABLET ORAL at 05:12

## 2018-09-03 RX ADMIN — VANCOMYCIN HYDROCHLORIDE 1250 MG: 10 INJECTION, POWDER, LYOPHILIZED, FOR SOLUTION INTRAVENOUS at 17:55

## 2018-09-03 RX ADMIN — LEVOTHYROXINE SODIUM 75 MCG: 75 TABLET ORAL at 07:29

## 2018-09-03 RX ADMIN — VANCOMYCIN HYDROCHLORIDE 1250 MG: 10 INJECTION, POWDER, LYOPHILIZED, FOR SOLUTION INTRAVENOUS at 00:39

## 2018-09-03 ASSESSMENT — PAIN DESCRIPTION - DESCRIPTORS
DESCRIPTORS: ACHING
DESCRIPTORS: ACHING
DESCRIPTORS: ACHING;DULL;HEADACHE
DESCRIPTORS: ACHING

## 2018-09-03 ASSESSMENT — ACTIVITIES OF DAILY LIVING (ADL)
PREVIOUS_RESPONSIBILITIES: MEAL PREP;HOUSEKEEPING;LAUNDRY;SHOPPING;FINANCES;DRIVING;WORK
ADLS_ACUITY_SCORE: 11

## 2018-09-03 ASSESSMENT — VISUAL ACUITY
OU: BASELINE

## 2018-09-03 NOTE — PLAN OF CARE
Problem: Patient Care Overview  Goal: Plan of Care/Patient Progress Review  Outcome: No Change  Neuro: Left visual field cut, MRI obtained, pressure headache, EVD 15 above EAM, ICPs 2-5, CSF clear 1-12 ml/hr, forgetful  Resp: Room air  Cardio: Normal sinus rhythm, SB<140 goal  GI: Regular diet, no BM  : Voiding  Access: PIV (75 ml/hr NS)  Pain: PRN oxy and scheduled tylenol  Up with standby assist, bed alarm on.  P: Continue hourly neuro exams and ICP monitoring.  Notify neurosurgery with concerns.

## 2018-09-03 NOTE — PLAN OF CARE
Problem: Patient Care Overview  Goal: Plan of Care/Patient Progress Review  Outcome: No Change  D: 44 yo female, admitted s/p fall 8/30, removal of infected  shunt system, and right EVD placement.    I/A:  Neuro: Q1h neuro checks. A&Ox4, is intermittently forgetful. PERRL, brisk, 4x4. Moves all 4 extremities equally. L visual field cut/neglect. Coordination intact. Face symmetrical. Denies numbness/tingling. C/o intermittent frontal headache, improves w/PRN Oxycodone and scheduled Tylenol.    EVD: R EVD @ 68dhO5V above EAM. ICP 2-7, draining 1-10cc clear-pink output/hr.  Pulm: On room air, lungs clear in BUL, diminished in bases.  CV: HR 60-70s, SR, no ectopy. BP 115s/60s-70s (Goal SBP<140).  PV: 2+ radial and DP pulses, no edema noted. PCDs on.   GI: Hypoactive bowel tones, passing gas. LBM 9/1/2018. On regular diet. Good PO fluid intake.  : Voids spontaneously, large amounts (avg 400). Clear, yellow urine.  ID: TMax 99s. Receiving scheduled Tylenol.  Skin: Scattered abrasions from fall. Surgical dressings w/old dried drainage, intact.  MS: No noticeable deficits, up w/SBA, ambulated to bathroom multiple times.  Psych/Social: No family at bedside, available via telephone.  Lines: L FA PIV.  Drips: TKO for antibiotics.  Plan:  Continue to monitor, notify neurosurgery of any concerns/changes.

## 2018-09-03 NOTE — PLAN OF CARE
Neuro - Exam intact except for significant left field cut.  Pt gait occasionally unsteady due to limited vision in left eye.  EVD at 15 above.  CSF clear.  ICP's 0-8, typically 0-4.  EVD output 0-22, but typically 0-5.  Pain well controlled with PRN oxycodone.  Q2 Neuro checks.      Cardiac - NSR, no ectopy.  Afebrile.  Pulses 2+.  No edema.      Resp - Room air, clear over dim.      GI - Regular diet, moderate appetite but well tolerated.  Bowels active, passing flatus.  Abdomen obese and soft.       - Voids adequate amounts of clear yellow urine.    Skin - EVD site WDL.  Old dried drainage on posterior dressing.

## 2018-09-03 NOTE — PLAN OF CARE
Problem: Patient Care Overview  Goal: Plan of Care/Patient Progress Review  4A - Evaluation completed and treatment initiated.   Discharge Planner PT   Patient plan for discharge: Pt not specifying, but reports she has family to assist her at home  Current status: CGA-SBA with ambulation > 500' without AD, demonstrates narrow base of support and cross over stepping with decreased awareness to deficits, but able to tell she feels unsteady, no awareness of objects/turns on left side without cueing. 1 LOB with turn around obstacles in room.   Barriers to return to prior living situation: balance, visual deficit  Recommendations for discharge: home with 24/7 assist and OP PT  Rationale for recommendations: Pt demonstrates decreased L visual awareness, cross over stepping and narrow base of support with decreased awareness. Limited by headache and activity tolerance.       Entered by: Amber Porter 09/03/2018 3:16 PM

## 2018-09-03 NOTE — PROVIDER NOTIFICATION
Updated Dr. Pickard at bedside, notified of Left visual neglect. MD assessed, confirmed, verbalized understanding. NO new orders at this time. Drain to remain at 15cm H2O above.

## 2018-09-03 NOTE — PLAN OF CARE
Problem: Patient Care Overview  Goal: Plan of Care/Patient Progress Review  Discharge Planner OT   Patient plan for discharge: Hopeful to return home with assist from family PRN.  Agreeable to OP therapies if recommended.   Current status: OT evaluation completed, treatment initiated.  Pt demonstrates IND with supine to sit and STS transfers.  Facilitated functional mobility ~600' with SBA/CGA and no assistive device, but requires mod VCs during ambulation to implement strategies to compensate for L visual field cut (tends to run in to obstacles located on the L side).  Does not demonstrate any overt LOB or instability, but endorses that balance feels poor.  Pt able to read craniotomy precautions handout IND. Educated pt on ways in which to modify ADLs to ensure adherence to precautions.   Barriers to return to prior living situation: Craniotomy precautions, L visual field cut, endurance   Recommendations for discharge: Home with assist from family for mobility and I/ADLs.  Pt would benefit from OP OT to address vision deficits and ensure safety with returning to I/ADLs.  Rationale for recommendations: Pt is mobilizing well, demonstrates understanding of post-surgical precautions, and appears to have adequate family support.  Would recommend continued therapy to address remaining vision deficits to maximize safety and IND with I/ADLs and mobility.       Entered by: Tung Rodriguez 09/03/2018 2:06 PM

## 2018-09-03 NOTE — PROVIDER NOTIFICATION
Notified Neurosurgery that ICP waveform significantly dampened since last neuro check, does have a bounce. Neuro exam otherwise unchanged, headache improved. ICP 6, drain output 6cc. Also notified that UOP 850cc in last two hours. MD verbalized understanding, routine head CT to be ordered for AM, no other new orders at this time.

## 2018-09-03 NOTE — PROGRESS NOTES
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     Problem List  1. Ventriculitis with cerebral edema and right posterior occipital tract abscess   2. Staph aureus in CSF  3. Status post VPS shunt removal and EVD placement  4. History of 3rd ventricular arachnoid cyst s/p VPS 2018  5. Headaches  6. New onset seizures     24 hour events:  No acute events overnight  Complaining of headache this morning  EVD @ 15    24 Hour Vital Signs Summary:  Temperatures:  Current - Temp: 98.1  F (36.7  C); Max - Temp  Av.4  F (36.9  C)  Min: 98  F (36.7  C)  Max: 99.6  F (37.6  C)  Respiration range: Resp  Avg: 15.8  Min: 12  Max: 18  Pulse range: Pulse  Av.5  Min: 62  Max: 71  Blood pressure range: Systolic (24hrs), Av , Min:89 , Max:128   ; Diastolic (24hrs), Av, Min:43, Max:77  Pulse oximetry range: SpO2  Av.5 %  Min: 92 %  Max: 98 %  Resp: 14  Intake/Output Summary (Last 24 hours) at 18 0833  Last data filed at 18 0700   Gross per 24 hour   Intake          4781.25 ml   Output             4204 ml   Net           577.25 ml   EVD output 77 cc    BP - Mean:  [] 92    Current Medications:    acetaminophen  975 mg Oral Q8H     cefTRIAXone  2 g Intravenous Q12H     FLUoxetine  60 mg Oral Daily     levETIRAcetam  1,125 mg Oral BID     levothyroxine  75 mcg Oral QAM AC     sodium chloride (PF)  3 mL Intracatheter Q8H     vancomycin (VANCOCIN) IV  1,250 mg Intravenous Q8H   PRN Medications:  acetaminophen, hydrALAZINE, labetalol, lidocaine 4%, lidocaine (buffered or not buffered), magnesium sulfate, magnesium sulfate, metoclopramide **OR** metoclopramide, naloxone, ondansetron **OR** ondansetron, oxyCODONE IR,  "potassium chloride, potassium chloride, potassium chloride, potassium chloride, potassium phosphate (KPHOS) in D5W IV, potassium phosphate (KPHOS) in D5W IV, potassium phosphate (KPHOS) in D5W IV, potassium phosphate (KPHOS) in D5W IV, potassium phosphate (KPHOS) in D5W IV, prochlorperazine **OR** prochlorperazine, sodium chloride (PF)    No Known Allergies    Physical Examination:  /75  Pulse 62  Temp 98.1  F (36.7  C) (Oral)  Resp 14  Ht 1.803 m (5' 11\")  Wt 88.5 kg (195 lb 1.7 oz)  SpO2 95%  BMI 27.21 kg/m2  General: Mild distress from headache  HEENT: eyes nonicteric, dressings in place on scalp  CVS: extremities well perfused.  Resp: Symmetric respirations. No use of accessory muscles.  Abd: Soft, nondistended   Extremities: no edema  Neurological Examination  Mentation - alert, attentive, oriented to person, follows commands, speech intact and clear  Cranial nerves - Left homonymous hemianopia with macular sparing.  EOMI.  Facial expressions symmetric.  Hearing intact to conversation. No dysarthria.  Palate elevation symmetric.  Strong shoulder shrug bilaterally.  Tongue midline.  Motor exam - Strength 5/5 throughout.  No abnormal movements noted.  Reflexes - 2+ and symmetric at biceps, triceps, brachioradialis, patellar, achilles.  Sensation - intact to light touch in all four extremities.  Coordination - finger-nose-finger and heel-shin without dysmetria bilaterally.  Gait - not assessed    Labs/Studies:  Recent Labs   Lab Test  09/03/18   0208  09/02/18   0710   NA  136  137   POTASSIUM  3.8  4.1   CHLORIDE  103  105   CO2  27  23   ANIONGAP  6  10   GLC  79  78   BUN  4*  5*   CR  0.78  0.76   EDUIN  8.2*  9.0   WBC  7.4  8.7   RBC  3.11*  3.46*   HGB  9.6*  10.6*   PLT  431  494*     Recent Labs   Lab Test  09/02/18   0710   INR  1.12     Imaging:  Mr Brain W/o & W Contrast -   1. Small rim-enhancing fluid collections in the posterior right cerebral hemisphere along the ventriculostomy catheter " tract, most likely small abscess along a catheter tract.   2. Confluent abnormal T2 signal surrounding these collections is favored edema.     Assessment/Plan  43 year old right-handed lady with past medical history including vellum interpositum intracranial arachnoid cyst s/p EVD placement 7/12/2018 complicated by seizure and ventriculoperitoneal shunt placement 7/18/2018, unspecified mood disorder, hypothyroidism who was transferred here 9/1/2018 from [place] after a fall at home associated with persisting headache and purulent drainage at occipital ventriculoperitoneal shunt surgical site.     Neuro:  #Shunt infection/ Ventriculitis with tract abscess   -  shunt removed, EVD placed on 9/2/2018.  - Noted to have purulent fluid from  shunt, cultures growing staph aureus  - Continue to monitor EVD and ICPs  - MRI brain shows evidence of small abscess along tract with edema      #Gram-positive cocci in CSF  - Heavy growth of Staph aureus in CSF culture noted  - See plan in ID below     #Hydrocephalus s/p EVD  - H/o arachnoid cyst s/p  shunt placement  -  shunt removed, EVD placed  - EVD @ 15     #Seizure  - First seizure post-op 7/12/2018.    - Started on keppra 750 BID at that time  - Unclear if fall on 8/30/2018 was secondary to breakthrough seizure.  - Continue keppra for now, if patient has worsening mentation then consider EEG monitoring.     #Chronic headache  - PRN tylenol and oxy     #Depression  - Wellbutrin 200 at home, recommend to stop medication (decreased seizure threshold)  - Continue prozac at home dose.  - Takes Klonopin PRN for anxiety, monitor closely for withdrawal seizures.     Resp:   - Stable on room air  - continuous pulse ox monitoring   - maintain O2 saturations > 92%      CV:    - No issues  - SBP goal: MAP>65     Renal/FEN:   - strict monitoring of I/O  - Not on any IVFs     Endo:   - No current concerns     Heme:   - No leucocytosis noted  - Hg > 7.0   - Plt > 100k   - INR  <1.5      GI:   - No current concerns  - Regular diet     ID:   #Shunt infection/ Ventriculitis with Staph aureus in CSF  - CSF culture 9/1/2018 2330 shows heavy growth of Staph aureus  - Started on CNS dosing of vancomycin and rocephin  - Aim for vanco trough of 20  - Infectious disease consulted  - Checking serial CSF cultures, ESR and CRP every 72 hours  - CSF 9/1/18: WBC 0, RBC 85648  -  9/3/18  - CRP 64 9/2/18     FEN: Regular diet  PPx:- SCDs   Code: Full Code    Case seen and discussed with Dr uSnita Steel MD  Vascular Neurology fellow  Pager # [phone]

## 2018-09-03 NOTE — PROGRESS NOTES
09/03/18 1500   Quick Adds   Type of Visit Initial PT Evaluation   Living Environment   Lives With sibling(s)   Living Arrangements house   Home Accessibility stairs to enter home;stairs within home   Number of Stairs to Enter Home 5   Number of Stairs Within Home (several levels)   Transportation Available family or friend will provide  (has not been driving)   Living Environment Comment Pt has recently been staying at her mother's home to be available to provide assistance as needed.  However, pt typically lives with her SO and 22 year old daughter.  Pt home is multi-level and mother's home has multiple stairs as well per SO.   Self-Care   Usual Activity Tolerance good   Current Activity Tolerance moderate   Regular Exercise yes   Activity/Exercise Type biking;walking   Exercise Amount/Frequency daily   Equipment Currently Used at Home none   Activity/Exercise/Self-Care Comment PTA, pt was IND with all ADLs and mobility.  Pt works full time as a  - has not been working since having shunt procedure.  Pt enjoys staying active, likes to walk and bike, has been less active since previous surgery.    Functional Level Prior   Ambulation 0-->independent   Transferring 0-->independent   Toileting 0-->independent   Bathing 0-->independent   Dressing 0-->independent   Eating 0-->independent   Communication 0-->understands/communicates without difficulty   Swallowing 0-->swallows foods/liquids without difficulty   Cognition 0 - no cognition issues reported   Fall history within last six months yes   Number of times patient has fallen within last six months 1   Which of the above functional risks had a recent onset or change? 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 shunt 7/18/2018 admitted on 9/1/2018 with worsening headaches and noted to have ventriculitis and edema due to  shunt placed at OSH   Precautions/Limitations (open EVD)   Heart Disease Risk Factors Overweight   Cognitive Status Examination   Orientation orientation to person, place and time   Level of Consciousness alert   Follows Commands and Answers Questions 100% of the time   Personal Safety and Judgment at risk behaviors demonstrated  (minorly impulsive once moving)   Pain Assessment   Patient Currently in Pain Yes, see Vital Sign flowsheet   Posture    Posture Forward head position;Protracted shoulders   Range of Motion (ROM)   ROM Comment BUE and BLE grossly WFL   Strength   Strength Comments BUE and BLE grossly WFL, not formally assessed   Bed Mobility   Bed Mobility Comments Supine > sit with SBA   Transfer Skills   Transfer Comments Sit <> stand with SBA   Gait   Gait Comments Ambulated without AD with CGA, cross over stepping/narrow GENTRY   Balance   Balance Comments Unsteady with ambulation, LOB x 1 with turn to left in room while standing, min A   General Therapy Interventions   Planned Therapy Interventions balance training;gait training;neuromuscular re-education;motor coordination training;risk factor education;home program guidelines;progressive activity/exercise   Clinical Impression   Criteria for Skilled Therapeutic Intervention yes, treatment indicated   PT Diagnosis Impaired balance   Influenced by the following impairments balance, visual deficit, pain, activity tolerance   Functional limitations due to impairments gait, stairs, functional endurance   Clinical Presentation Evolving/Changing   Clinical Presentation Rationale Improved clinical picture with open EVD, L field cut(new visual changes),  "balance impairments   Clinical Decision Making (Complexity) Low complexity   Therapy Frequency` other (see comments)  (4x/wk)   Predicted Duration of Therapy Intervention (days/wks) 1 week   Anticipated Discharge Disposition Home with Outpatient Therapy;Home with Assist   Risk & Benefits of therapy have been explained Yes   Patient, Family & other staff in agreement with plan of care Yes   [facility] TM \"6 Clicks\"   2016, Trustees of [facility], under license to [employer].  All rights reserved.   6 Clicks Short Forms Basic Mobility Inpatient Short Form   [facility]  \"6 Clicks\" V.2 Basic Mobility Inpatient Short Form   1. Turning from your back to your side while in a flat bed without using bedrails? 4 - None   2. Moving from lying on your back to sitting on the side of a flat bed without using bedrails? 4 - None   3. Moving to and from a bed to a chair (including a wheelchair)? 3 - A Little   4. Standing up from a chair using your arms (e.g., wheelchair, or bedside chair)? 4 - None   5. To walk in hospital room? 3 - A Little   6. Climbing 3-5 steps with a railing? 3 - A Little   Basic Mobility Raw Score (Score out of 24.Lower scores equate to lower levels of function) 21   Total Evaluation Time   Total Evaluation Time (Minutes) 9     "

## 2018-09-03 NOTE — PROGRESS NOTES
09/03/18 1133   Quick Adds   Type of Visit Initial Occupational Therapy Evaluation   Living Environment   Lives With significant other   Living Arrangements house   Home Accessibility stairs within home;stairs to enter home   Number of Stairs to Enter Home 5   Number of Stairs Within Home (several levels )   Transportation Available family or friend will provide   Living Environment Comment Pt has recently been staying at her mother's home to be available to provide assistance as needed.  However, pt typically lives with her SO and 22 year old daughter.  Pt's home is multi-level and mother's home is a single-level.     Self-Care   Dominant Hand right   Usual Activity Tolerance good   Current Activity Tolerance moderate   Regular Exercise yes   Activity/Exercise Type walking;biking   Exercise Amount/Frequency daily   Equipment Currently Used at Home none   Activity/Exercise/Self-Care Comment PTA, pt was IND with all ADLs and mobility.  Pt works full time as a  - has not been working since having shunt procedure.  Pt enjoys staying active, likes to walk and bike.     Functional Level Prior   Ambulation 0-->independent   Transferring 0-->independent   Toileting 0-->independent   Bathing 0-->independent   Dressing 0-->independent   Eating 0-->independent   Communication 0-->understands/communicates without difficulty   Swallowing 0-->swallows foods/liquids without difficulty   Cognition 0 - no cognition issues reported   Fall history within last six months yes   Number of times patient has fallen within last six months 1   Which of the above functional risks had a recent onset or change? ambulation;toileting;bathing  (vision )   Prior Functional Level Comment Previously IND with all I/ADLs and mobility.  Does not use DME.        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    Precautions/Limitations other (see comments)  (EVD )   Weight-Bearing Status - LUE full weight-bearing   Weight-Bearing Status - RUE full weight-bearing   Weight-Bearing Status - LLE full weight-bearing   Weight-Bearing Status - RLE full weight-bearing   General Observations Pt resting in bed upon arrival, pleasant and agreeable to OT.  Uses humor frequently throughout session.  C/o headache.     General Info Comments Activity: Ambulate with assist   Cognitive Status Examination   Orientation orientation to person, place and time   Cognitive Comment No deficits identified    Visual Perception   Visual Perception (Has glasses/contact prescription, but doesn't wear )   Visual Field L visual field cut (able to see 45 degrees to L periphery only)   Visual Attention Pt presents with L vision neglect/inattention    Occulomotor Normal occulomotor strength    Pain Assessment   Patient Currently in Pain Yes, see Vital Sign flowsheet   Integumentary/Edema   Integumentary/Edema Comments EVD    Range of Motion (ROM)   ROM Comment WFLs    Strength   Strength Comments WFLs    Hand Strength   Hand Strength Comments WFLs for ADLs    Coordination   Coordination Comments WFL    Mobility   Bed Mobility Comments IND with supine to sit transfer   Transfer Skill: Bed to Chair/Chair to Bed   Level of [place]: Bed to Chair stand-by assist   Physical Assist/Nonphysical Assist: Bed to Chair 1 person assist   Weight-Bearing Restrictions full weight-bearing   Transfer Skill: Sit to Stand   Level of [place]: Sit/Stand  "independent   Physical Assist/Nonphysical Assist: Sit/Stand 1 person assist   Transfer Skill: Sit to Stand full weight-bearing   Instrumental Activities of Daily Living (IADL)   Previous Responsibilities meal prep;housekeeping;laundry;shopping;finances;driving;work   Activities of Daily Living Analysis   Impairments Contributing to Impaired Activities of Daily Living balance impaired;pain  (L visual field cut)   General Therapy Interventions   Planned Therapy Interventions ADL retraining;IADL retraining;balance training;other (see comments)  (vision therapy/compensatory strategies )   Clinical Impression   Criteria for Skilled Therapeutic Interventions Met yes, treatment indicated   OT Diagnosis Decreased safety and IND with IADLs and functional mobility    Influenced by the following impairments L visual field cut, balance, pain, craniotomy precautions   Assessment of Occupational Performance 3-5 Performance Deficits   Identified Performance Deficits home management, ambulation, work, driving, ADLs    Clinical Decision Making (Complexity) Low complexity   Therapy Frequency 5 times/wk   Predicted Duration of Therapy Intervention (days/wks) 2 weeks    Anticipated Equipment Needs at Discharge (TBD )   Anticipated Discharge Disposition Home with Assist;Home with Outpatient Therapy   Risks and Benefits of Treatment have been explained. Yes   Patient, Family & other staff in agreement with plan of care Yes   [facility] TM \"6 Clicks\"   2016, Trustees of [facility], under license to [employer].  All rights reserved.   6 Clicks Short Forms Daily Activity Inpatient Short Form   [facility]  \"6 Clicks\" Daily Activity Inpatient Short Form   1. Putting on and taking off regular lower body clothing? 3 - A Little   2. Bathing (including washing, rinsing, drying)? 3 - A Little   3. Toileting, which includes using toilet, bedpan or urinal? 3 - A Little   4. Putting on and taking off regular upper " body clothing? 3 - A Little   5. Taking care of personal grooming such as brushing teeth? 3 - A Little   6. Eating meals? 4 - None   Daily Activity Raw Score (Score out of 24.Lower scores equate to lower levels of function) 19   Total Evaluation Time   Total Evaluation Time (Minutes) 5

## 2018-09-03 NOTE — PROGRESS NOTES
Neurosurgery Progress Note    S: moderate headache    O:  Exam:  General: Awake;  Alert, In No Acute Distress  Pulm: Breathing Comfortably on room air  Mental status: Oriented x 3  Cranial Nerves: Cranial Nerves II-XII Intact Bilaterally with exception of left homonymous hemianopsia  Strength:      Del Tr Bi WE WF Gr  R 5 5 5 5 5 5  L 5 5 5 5 5 5     HF KE KF DF PF EHL  R 5 5 5 5 5 5  L 5 5 5 5 5 5    Pronator Drift: Absent  Sensory: Intact to Light Touch  INCISION: ventriculostomy site covered by primapore     Assessment:   #ventriculitis with associated cerebral edema  due to ventriculoperitoneal shunt placement at outside hospital   #history of obstructive hydrocephalus due to cystic lesion     Plan by problem:     Neuro:   Anti-epileptics: continue prior to admission keppra  Drains: EVD @ 15 cm H2O above EAM  Continue prior to admission anti-depressant   See ID section for antibiotic treatment of intracranial infection    Cardiovascular: no issues    Pulmonary: Incentive spirometry     Gastrointestinal: regular diet     Renal: monitor intake and output     Heme: daily CBC     Endocrine: continue prior to admission levothyroxine    Infectious ID consulted; appreciate recommendations. Continue ceftriaxone and vancomycin. Serial CSF cultures. ESR and CRP every 72 hours.    PT/OT: pending    DVT prophylaxis: Ambulate TID. Sequential compression devices; chemoprophylaxis held due to bleeding risk     Ulcer prophylaxis: none indicated    DISPO:  pending resolution of critical illness.     Barriers: EVD, critical illness, surgical decision making      -----------------------------------  Luz Mary MD, MS  Neurosurgery PGY-2

## 2018-09-03 NOTE — OP NOTE
Procedure Date: 09/02/2018      DATE OF SURGERY:  09/02/2018.      ATTENDING NEUROSURGEON:  Antonio Salinas MD      RESIDENT ASSISTANTS:     1.  Merlin Moore MD, PhD   2.  Dnea Neri MD      PROCEDURES PERFORMED:    1. Right-sided Stealth-guided external ventricular drain placement   2. Removal of preexisting right-sided occipital ventriculoperitoneal shunt system.      ANESTHESIA:  General endotracheal.      ESTIMATED BLOOD LOSS:  5 mL.      INTRAOPERATIVE COMPLICATIONS:  None.      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  She sustained a fall recently and following the fall had worsening headaches and tenderness over the shunt site, and allegedly a first time seizure.  At the outside hospital emergency room, the records indicate there was some purulent discharge from her surgical site.  At the time, she was afebrile with a white count of 13.  She was then transferred to the [facility].  Her shunt was tapped and no free flow of CSF was appreciated.  The decision was made to remove the patient's shunt, send for cultures given the possible history of purulence in the site, and place an external ventricular drain.  The patient was apprised of the plan and was in agreement.      DESCRIPTION OF PROCEDURE:  The patient was identified in the preoperative holding area.  Informed consent was signed and verified.  The patient was brought by our anesthesia colleagues to the operating theater and general anesthesia was induced.  The patient was successfully endotracheally intubated.  The patient was successfully registered to the Wom[employer] system utilizing the fiducials previously placed.  She was positioned 90 degrees with her head gazing to the left on a  foam donut.  Care was taken to ensure that all pressure points were adequately padded.  Adequate IV access was obtained.  Her previous right occipital shunt incision was identified, as well as her previous right external ventricular drain placement site.  A trajectory from the right ventricular drain bur hole was created utilizing the Medtronic Stealth system with a target point of the foramen of Monro.  The patient's left arm was tucked.  The patient was then prepped and draped in the standard sterile fashion.  A surgical timeout was performed confirming the patient's identity, site and side of surgery.  No preoperative antibiotics were given and at this point, planned to administer a gram of vancomycin following cultures.  No local anesthetic was administered either.  Ventricular drain and Alves bag were present in the room.        Following the completion of the timeout, the right-sided occipital previous curvilinear incision was incised utilizing a #15 blade scalpel.  Monopolar cautery was used to finish dissecting through the skin and subcutaneous tissues.  The previous shunt tubing and valve were identified.  Hemostasis was obtained.  The ventricular catheter was disconnected from the valve.  No CSF flow was visible.  The catheter was slowly removed with gentle aspiration from a 10 mL Luer-Robert syringe attached to it.  During the removal of the catheter, approximately 2 mL of grossly purulent fluid was able to be easily aspirated into the syringe.  This was sent for culture.        Following the removal of the ventricular drain, the valve was identified and it was removed.  Copious irrigation was used to irrigate the wound.  Simultaneously, the right-sided previous ventricular drain site was incised with a #15 blade scalpel.  Monopolar cautery was used to continue dissection and establish hemostasis through the skin and subcutaneous tissues.  The skull was identified.  The previous bur hole was successfully  identified.  A significant amount of granulation tissue was evident that the bur hole site.  A straight curet was used to remove this granulation tissue and dura was immediately evident underneath.  The Stealth neuronavigation probe was placed at the entry site and the trajectory to the foramen of Monro was identified.  A Codman Bactiseal ventricular catheter was brought into the field.  This was inserted into the depth of approximately 5.5 cm, which had previously been measured utilizing the MADS Stealth system.  Brisk flow of xanthochromic CSF was immediately evident.  A trocar was used to tunnel greater than 5 cm away from the EVD incision near the midline.  The proximal portion of the trocar was attached to the distal portion of the ventricular drain.  This was pulled through the tunneling site.  The catheter was then secured at exit site to the skin utilizing 3-0 nylon suture.  The adapter was placed on the ventricular drain catheter and connected to the Alves bag in sterile fashion.     The right frontal incision was copiously irrigated with antibiotic containing saline and prior to the copious irrigation for the right side occipital incision, swabs were taken of the incision site.        We now turned our attention to the closure.  For both incisions, the dermal layer was closed utilizing 3-0 Vicryl sutures placed in the inverted interrupted manner, and the skin was closed utilizing 3-0 nylon suture.  A ventricular drain was placed in a strain relief loop and secured to the scalp.  A Biopatch was placed and immediately following the culture swabbing, a gram of vancomycin was administered.  Both incision sites were cleaned, dried, and ChloraPrep used.  Sterile dressings were applied.  Drapes were taken down patient was returned to Anesthesia where she was successfully extubated.  No immediate postoperative complications were noted.  All sponge, needle and instrument counts were correct x2 at the  conclusion of the procedure.      Dr. Bedoya was present and scrubbed for all critical portions of the procedure and immediately available for the remainder.      I, Dr. Lee Bedoya, was present for the key portions of the procedure and immediately available for the entire operation.      LEE BEDOYA MD       As dictated by ANNALEE AVERY MD            D: 2018   T: 2018   MT: LO      Name:     FORTINO MCBRIDE   MRN:      [id number]        Account:        [id number]   :      1975           Procedure Date: 2018      Document: [id number]

## 2018-09-03 NOTE — SIGNIFICANT EVENT
At 1130 the patient's daugther, Roxanne, called the unit asking to speak to the bedside RN.  The call was transferred to my Ascom phone.  Roxanne called to inform the bedside RN of concerns she had over a potential visitor of the patient named Raymundo Huber.  She stated that the patient had a restraining order or an order of protection against Raymundo Huber.  She wasn't sure if Cecile would be visiting but that he had reportedly said he would be visiting her today.  She informed me that Cecile was an alcoholic, had been violent in the past, and could potentially be violent towards the patient.  She stated that the [facility] department had issued the order to stay away from the patient.  I then informed Jyoti AMES, the charge nurse for the shift, of the situation.    Around 1500, a man I believed to be Raymundo Huber arrived at the patient's room.  He identified himself and stated he was her soon to be ex boyfriend.  I asked Stephanie AMES to watch the patient and Bernstnellie while I went to update Jyoti AMES.  I called the [facility] department to enquire as to whether there was an order of protection or a restraining order against Cecile to stay away from the patient.  I spoke with Janki at the [facility] department.  She stated that there was a protection order issued stating that Bernstan shouldn't be around the patient.  I asked what the next course of action should be.  The [facility] department [facility]  Jyoti called [facility] to inform them of the situation.  [employer] security arrived on the unit.  Janki suggested we call [place] [employer] to have them come by and arrest Bernstnellie.  [facility] called [place] [employer] to have them arrest Bernstan.      At 1600 [place] [employer] arrived to arrest Cecile.  He was escorted off the unit by [employer] in handcuffs without incident.  At all times during Cecile's visit with the patient, I or another RN watched the  patient and visitor to ensure the patient's safety.

## 2018-09-03 NOTE — PROVIDER NOTIFICATION
0145: Notified Neurosurgery that UOP 450cc over last hour (>400 x3 hours), more clear/yellow. Neuro exam unchanged. EVD continues to have dampened waveform. ICP 2, output 6cc. MD ordered AM BMP to be drawn early to check sodium.  0300: Updated Neurosurg on patient condition, lab results. No new orders at this time, continue to monitor.

## 2018-09-04 ENCOUNTER — APPOINTMENT (OUTPATIENT)
Dept: GENERAL RADIOLOGY | Facility: CLINIC | Age: 43
End: 2018-09-04
Attending: NURSE PRACTITIONER
Payer: COMMERCIAL

## 2018-09-04 ENCOUNTER — APPOINTMENT (OUTPATIENT)
Dept: CT IMAGING | Facility: CLINIC | Age: 43
End: 2018-09-04
Attending: STUDENT IN AN ORGANIZED HEALTH CARE EDUCATION/TRAINING PROGRAM
Payer: COMMERCIAL

## 2018-09-04 ENCOUNTER — APPOINTMENT (OUTPATIENT)
Dept: PHYSICAL THERAPY | Facility: CLINIC | Age: 43
End: 2018-09-04
Attending: NEUROLOGICAL SURGERY
Payer: COMMERCIAL

## 2018-09-04 ENCOUNTER — APPOINTMENT (OUTPATIENT)
Dept: OCCUPATIONAL THERAPY | Facility: CLINIC | Age: 43
End: 2018-09-04
Attending: NEUROLOGICAL SURGERY
Payer: COMMERCIAL

## 2018-09-04 LAB
ANION GAP SERPL CALCULATED.3IONS-SCNC: 8 MMOL/L (ref 3–14)
APPEARANCE CSF: CLEAR
BACTERIA SPEC CULT: ABNORMAL
BASOPHILS NFR CSF MANUAL: 1 %
BUN SERPL-MCNC: 7 MG/DL (ref 7–30)
CALCIUM SERPL-MCNC: 8.7 MG/DL (ref 8.5–10.1)
CHLORIDE SERPL-SCNC: 104 MMOL/L (ref 94–109)
CO2 SERPL-SCNC: 23 MMOL/L (ref 20–32)
COLOR CSF: COLORLESS
CREAT SERPL-MCNC: 0.71 MG/DL (ref 0.52–1.04)
EOSINOPHIL NFR CSF MANUAL: 2 %
ERYTHROCYTE [DISTWIDTH] IN BLOOD BY AUTOMATED COUNT: 12.3 % (ref 10–15)
GFR SERPL CREATININE-BSD FRML MDRD: 89 ML/MIN/1.7M2
GLUCOSE BLDC GLUCOMTR-MCNC: 75 MG/DL (ref 70–99)
GLUCOSE CSF-MCNC: 37 MG/DL (ref 40–70)
GLUCOSE SERPL-MCNC: 76 MG/DL (ref 70–99)
GRAM STN SPEC: NORMAL
GRAM STN SPEC: NORMAL
HCT VFR BLD AUTO: 33.3 % (ref 35–47)
HGB BLD-MCNC: 10.6 G/DL (ref 11.7–15.7)
INTERPRETATION ECG - MUSE: NORMAL
LYMPH ABN NFR CSF MANUAL: 61 %
Lab: ABNORMAL
MAGNESIUM SERPL-MCNC: 2.4 MG/DL (ref 1.6–2.3)
MCH RBC QN AUTO: 30.9 PG (ref 26.5–33)
MCHC RBC AUTO-ENTMCNC: 31.8 G/DL (ref 31.5–36.5)
MCV RBC AUTO: 97 FL (ref 78–100)
MONOS+MACROS NFR CSF MANUAL: 10 %
NEUTROPHILS NFR CSF MANUAL: 26 %
PHOSPHATE SERPL-MCNC: 3 MG/DL (ref 2.5–4.5)
PLATELET # BLD AUTO: 368 10E9/L (ref 150–450)
POTASSIUM SERPL-SCNC: 4.2 MMOL/L (ref 3.4–5.3)
PROT CSF-MCNC: 86 MG/DL (ref 15–60)
RBC # BLD AUTO: 3.43 10E12/L (ref 3.8–5.2)
RBC # CSF MANUAL: 618 /UL (ref 0–2)
SODIUM SERPL-SCNC: 135 MMOL/L (ref 133–144)
SPECIMEN SOURCE: ABNORMAL
SPECIMEN SOURCE: NORMAL
TUBE # CSF: ABNORMAL #
VANCOMYCIN SERPL-MCNC: 21.3 MG/L
WBC # BLD AUTO: 6.4 10E9/L (ref 4–11)
WBC # CSF MANUAL: 64 /UL (ref 0–5)

## 2018-09-04 PROCEDURE — 82945 GLUCOSE OTHER FLUID: CPT | Performed by: STUDENT IN AN ORGANIZED HEALTH CARE EDUCATION/TRAINING PROGRAM

## 2018-09-04 PROCEDURE — 25000125 ZZHC RX 250: Performed by: NURSE PRACTITIONER

## 2018-09-04 PROCEDURE — 25000132 ZZH RX MED GY IP 250 OP 250 PS 637: Performed by: STUDENT IN AN ORGANIZED HEALTH CARE EDUCATION/TRAINING PROGRAM

## 2018-09-04 PROCEDURE — 20000004 ZZH R&B ICU UMMC

## 2018-09-04 PROCEDURE — 40000556 ZZH STATISTIC PERIPHERAL IV START W US GUIDANCE

## 2018-09-04 PROCEDURE — 85027 COMPLETE CBC AUTOMATED: CPT | Performed by: STUDENT IN AN ORGANIZED HEALTH CARE EDUCATION/TRAINING PROGRAM

## 2018-09-04 PROCEDURE — 70450 CT HEAD/BRAIN W/O DYE: CPT

## 2018-09-04 PROCEDURE — 89051 BODY FLUID CELL COUNT: CPT | Performed by: STUDENT IN AN ORGANIZED HEALTH CARE EDUCATION/TRAINING PROGRAM

## 2018-09-04 PROCEDURE — 84100 ASSAY OF PHOSPHORUS: CPT | Performed by: STUDENT IN AN ORGANIZED HEALTH CARE EDUCATION/TRAINING PROGRAM

## 2018-09-04 PROCEDURE — 25000128 H RX IP 250 OP 636: Performed by: STUDENT IN AN ORGANIZED HEALTH CARE EDUCATION/TRAINING PROGRAM

## 2018-09-04 PROCEDURE — 25000125 ZZHC RX 250: Performed by: STUDENT IN AN ORGANIZED HEALTH CARE EDUCATION/TRAINING PROGRAM

## 2018-09-04 PROCEDURE — 40000986 XR CHEST PORT 1 VW

## 2018-09-04 PROCEDURE — C1769 GUIDE WIRE: HCPCS

## 2018-09-04 PROCEDURE — 87075 CULTR BACTERIA EXCEPT BLOOD: CPT | Performed by: STUDENT IN AN ORGANIZED HEALTH CARE EDUCATION/TRAINING PROGRAM

## 2018-09-04 PROCEDURE — 25000128 H RX IP 250 OP 636: Performed by: NEUROLOGICAL SURGERY

## 2018-09-04 PROCEDURE — 80202 ASSAY OF VANCOMYCIN: CPT | Performed by: NEUROLOGICAL SURGERY

## 2018-09-04 PROCEDURE — 27210195 ZZH KIT POWER PICC DOUBLE LUMEN

## 2018-09-04 PROCEDURE — 97112 NEUROMUSCULAR REEDUCATION: CPT | Mod: GP | Performed by: REHABILITATION PRACTITIONER

## 2018-09-04 PROCEDURE — 87205 SMEAR GRAM STAIN: CPT | Performed by: STUDENT IN AN ORGANIZED HEALTH CARE EDUCATION/TRAINING PROGRAM

## 2018-09-04 PROCEDURE — 36415 COLL VENOUS BLD VENIPUNCTURE: CPT | Performed by: STUDENT IN AN ORGANIZED HEALTH CARE EDUCATION/TRAINING PROGRAM

## 2018-09-04 PROCEDURE — 40000193 ZZH STATISTIC PT WARD VISIT: Performed by: REHABILITATION PRACTITIONER

## 2018-09-04 PROCEDURE — 00000146 ZZHCL STATISTIC GLUCOSE BY METER IP

## 2018-09-04 PROCEDURE — 87070 CULTURE OTHR SPECIMN AEROBIC: CPT | Performed by: STUDENT IN AN ORGANIZED HEALTH CARE EDUCATION/TRAINING PROGRAM

## 2018-09-04 PROCEDURE — 97535 SELF CARE MNGMENT TRAINING: CPT | Mod: GO | Performed by: OCCUPATIONAL THERAPIST

## 2018-09-04 PROCEDURE — 83735 ASSAY OF MAGNESIUM: CPT | Performed by: NEUROLOGICAL SURGERY

## 2018-09-04 PROCEDURE — 36415 COLL VENOUS BLD VENIPUNCTURE: CPT | Performed by: NEUROLOGICAL SURGERY

## 2018-09-04 PROCEDURE — 84157 ASSAY OF PROTEIN OTHER: CPT | Performed by: STUDENT IN AN ORGANIZED HEALTH CARE EDUCATION/TRAINING PROGRAM

## 2018-09-04 PROCEDURE — 36569 INSJ PICC 5 YR+ W/O IMAGING: CPT

## 2018-09-04 PROCEDURE — 80048 BASIC METABOLIC PNL TOTAL CA: CPT | Performed by: STUDENT IN AN ORGANIZED HEALTH CARE EDUCATION/TRAINING PROGRAM

## 2018-09-04 PROCEDURE — 40000133 ZZH STATISTIC OT WARD VISIT: Performed by: OCCUPATIONAL THERAPIST

## 2018-09-04 RX ORDER — LIDOCAINE 40 MG/G
CREAM TOPICAL
Status: DISCONTINUED | OUTPATIENT
Start: 2018-09-04 | End: 2018-09-12 | Stop reason: HOSPADM

## 2018-09-04 RX ORDER — NAFCILLIN SODIUM 2 G/8ML
2 INJECTION, POWDER, FOR SOLUTION INTRAMUSCULAR; INTRAVENOUS EVERY 4 HOURS
Status: DISCONTINUED | OUTPATIENT
Start: 2018-09-04 | End: 2018-09-12 | Stop reason: HOSPADM

## 2018-09-04 RX ORDER — HYDROMORPHONE HYDROCHLORIDE 1 MG/ML
0.3 INJECTION, SOLUTION INTRAMUSCULAR; INTRAVENOUS; SUBCUTANEOUS ONCE
Status: COMPLETED | OUTPATIENT
Start: 2018-09-04 | End: 2018-09-04

## 2018-09-04 RX ORDER — CEFAZOLIN SODIUM 1 G/50ML
1250 SOLUTION INTRAVENOUS EVERY 8 HOURS
Status: DISCONTINUED | OUTPATIENT
Start: 2018-09-04 | End: 2018-09-04

## 2018-09-04 RX ORDER — HEPARIN SODIUM,PORCINE 10 UNIT/ML
5-10 VIAL (ML) INTRAVENOUS
Status: DISCONTINUED | OUTPATIENT
Start: 2018-09-04 | End: 2018-09-12 | Stop reason: HOSPADM

## 2018-09-04 RX ORDER — HEPARIN SODIUM,PORCINE 10 UNIT/ML
2-5 VIAL (ML) INTRAVENOUS
Status: DISCONTINUED | OUTPATIENT
Start: 2018-09-04 | End: 2018-09-12 | Stop reason: HOSPADM

## 2018-09-04 RX ORDER — HEPARIN SODIUM,PORCINE 10 UNIT/ML
5-10 VIAL (ML) INTRAVENOUS EVERY 24 HOURS
Status: DISCONTINUED | OUTPATIENT
Start: 2018-09-04 | End: 2018-09-12 | Stop reason: HOSPADM

## 2018-09-04 RX ADMIN — ACETAMINOPHEN 975 MG: 325 TABLET, FILM COATED ORAL at 20:40

## 2018-09-04 RX ADMIN — VANCOMYCIN HYDROCHLORIDE 1250 MG: 10 INJECTION, POWDER, LYOPHILIZED, FOR SOLUTION INTRAVENOUS at 01:42

## 2018-09-04 RX ADMIN — Medication 1125 MG: at 20:41

## 2018-09-04 RX ADMIN — FLUOXETINE 60 MG: 20 CAPSULE ORAL at 10:39

## 2018-09-04 RX ADMIN — Medication 1125 MG: at 10:40

## 2018-09-04 RX ADMIN — OXYCODONE HYDROCHLORIDE 5 MG: 5 TABLET ORAL at 21:51

## 2018-09-04 RX ADMIN — SENNOSIDES AND DOCUSATE SODIUM 2 TABLET: 8.6; 5 TABLET ORAL at 20:40

## 2018-09-04 RX ADMIN — ONDANSETRON 4 MG: 2 INJECTION INTRAMUSCULAR; INTRAVENOUS at 00:38

## 2018-09-04 RX ADMIN — NAFCILLIN SODIUM 2 G: 2 INJECTION, POWDER, LYOPHILIZED, FOR SOLUTION INTRAMUSCULAR; INTRAVENOUS at 16:00

## 2018-09-04 RX ADMIN — Medication 0.3 MG: at 00:38

## 2018-09-04 RX ADMIN — LIDOCAINE HYDROCHLORIDE 2 ML: 10 INJECTION, SOLUTION EPIDURAL; INFILTRATION; INTRACAUDAL; PERINEURAL at 15:46

## 2018-09-04 RX ADMIN — ACETAMINOPHEN 975 MG: 325 TABLET, FILM COATED ORAL at 12:30

## 2018-09-04 RX ADMIN — ONDANSETRON 4 MG: 2 INJECTION INTRAMUSCULAR; INTRAVENOUS at 10:06

## 2018-09-04 RX ADMIN — LEVOTHYROXINE SODIUM 75 MCG: 75 TABLET ORAL at 10:39

## 2018-09-04 RX ADMIN — CEFTRIAXONE SODIUM 2 G: 2 INJECTION, POWDER, FOR SOLUTION INTRAMUSCULAR; INTRAVENOUS at 04:20

## 2018-09-04 RX ADMIN — NAFCILLIN SODIUM 2 G: 2 INJECTION, POWDER, LYOPHILIZED, FOR SOLUTION INTRAMUSCULAR; INTRAVENOUS at 20:41

## 2018-09-04 RX ADMIN — OXYCODONE HYDROCHLORIDE 5 MG: 5 TABLET ORAL at 17:44

## 2018-09-04 RX ADMIN — OXYCODONE HYDROCHLORIDE 5 MG: 5 TABLET ORAL at 10:39

## 2018-09-04 ASSESSMENT — VISUAL ACUITY
OU: BASELINE

## 2018-09-04 ASSESSMENT — PAIN DESCRIPTION - DESCRIPTORS
DESCRIPTORS: ACHING

## 2018-09-04 ASSESSMENT — ACTIVITIES OF DAILY LIVING (ADL)
ADLS_ACUITY_SCORE: 11

## 2018-09-04 NOTE — PLAN OF CARE
Neuro - Exam stable except for left field cut, which is slightly improved from yesterday.  Pt was lethargic upon initial exam, attributable to IV Dilaudid.  Zofran X 1 given for nausea, Oxy 5mg X 2 given for pain.  Pt sat up in chair for 2.5 hrs and walked the unit twice. EVD at 15 above.  CSF output 0-9, ICP 0-6.      Cardiac - NSR, no ectopy.  Afebrile.  Pulses 2+.  No edema.    Resp - Room air, clear over dim.    GI - Regular diet.  Minimal appetitive, but any food consumed well tolerated.  No BM, stool softeners refused, passing gas.  Abdomen is soft and non tender.       - Voiding with adequate output.    Skin - Incision sites have no drainage or signs of infection.    Pt requested bedside RN update her cousin, which I did.

## 2018-09-04 NOTE — PLAN OF CARE
Problem: Patient Care Overview  Goal: Plan of Care/Patient Progress Review  Discharge Planner PT 4A  Patient plan for discharge: Pt not specifying, but reports she has family to assist her at home  Current status: Challenged by standing LE balance ex in room.  Limited by fatigue and headache today.  Barriers to return to prior living situation: balance, visual deficit  Recommendations for discharge: home with 24/7 assist and OP PT  Rationale for recommendations: Pt demonstrates decreased L visual awareness, cross over stepping and narrow base of support with decreased awareness. Limited by headache and activity tolerance.

## 2018-09-04 NOTE — PROGRESS NOTES
Neuroscience Intensive Care Progress Note    09/04/2018    Bandar Olmedo is a 43 year old year old female with h/o vellum interpositum intracranial arachnoid cyst s/p ventriculoperiotoneal shunt 7/18/2018 admitted on 9/1/2018 with worsening headaches and noted to have ventriculitis and edema due to  shunt placed at OSH.     Problem List  1. Ventriculitis with cerebral edema and right posterior occipital tract abscess   2. Staph aureus in CSF  3. Status post VPS shunt removal and EVD placement  4. History of 3rd ventricular arachnoid cyst s/p VPS 7/2018  5. Headaches  6. New onset seizures     Medications:  Current Facility-Administered Medications   Medication     acetaminophen (TYLENOL) tablet 650 mg     acetaminophen (TYLENOL) tablet 975 mg     cefTRIAXone (ROCEPHIN) 2 g vial to attach to  ml bag for ADULTS or NS 50 ml bag for PEDS     FLUoxetine (PROzac) capsule 60 mg     hydrALAZINE (APRESOLINE) injection 10-20 mg     labetalol (NORMODYNE/TRANDATE) injection 10-40 mg     levETIRAcetam (KEPPRA) half-tab 1,125 mg     levothyroxine (SYNTHROID/LEVOTHROID) tablet 75 mcg     lidocaine (LMX4) cream     lidocaine 1 % 1 mL     magnesium sulfate 2 g in NS intermittent infusion (PharMEDium or [facility] Cmpd)     magnesium sulfate 4 g in 100 mL sterile water (premade)     metoclopramide (REGLAN) tablet 10 mg    Or     metoclopramide (REGLAN) injection 10 mg     naloxone (NARCAN) injection 0.1-0.4 mg     ondansetron (ZOFRAN-ODT) ODT tab 4-8 mg    Or     ondansetron (ZOFRAN) injection 4-8 mg     oxyCODONE IR (ROXICODONE) tablet 5 mg     polyethylene glycol (MIRALAX/GLYCOLAX) Packet 17 g     potassium chloride (KLOR-CON) Packet 20-40 mEq     potassium chloride 10 mEq in 100 mL sterile water intermittent infusion (premix)     potassium chloride 20 mEq in 50 mL intermittent infusion     potassium chloride SA (K-DUR/KLOR-CON M) CR tablet 20-40 mEq     potassium phosphate 10 mmol in D5W 250 mL intermittent infusion      "potassium phosphate 15 mmol in D5W 250 mL intermittent infusion     potassium phosphate 20 mmol in D5W 250 mL intermittent infusion     potassium phosphate 20 mmol in D5W 500 mL intermittent infusion     potassium phosphate 25 mmol in D5W 500 mL intermittent infusion     prochlorperazine (COMPAZINE) injection 10 mg    Or     prochlorperazine (COMPAZINE) tablet 10 mg     senna-docusate (SENOKOT-S;PERICOLACE) 8.6-50 MG per tablet 2 tablet     sodium chloride (PF) 0.9% PF flush 3 mL     sodium chloride (PF) 0.9% PF flush 3 mL     vancomycin (VANCOCIN) 1,250 mg in sodium chloride 0.9 % 250 mL intermittent infusion       24 hour events:  High EVD output (70ml over 4hr) with associated headache. Slowed since back to baseline. Somnolent with dilaudid.  Arouses this AM.  Having neck pain.  ROM intact.  EVD @ 15    24 Hour Vital Signs Summary:  /78  Pulse 62  Temp 98.4  F (36.9  C) (Oral)  Resp 12  Ht 1.803 m (5' 11\")  Wt 88.5 kg (195 lb 1.7 oz)  SpO2 97%  BMI 27.21 kg/m2  Resp: 12    Intake/Output Summary (Last 24 hours) at 09/04/18 0737  Last data filed at 09/04/18 0700   Gross per 24 hour   Intake             1795 ml   Output             3178 ml   Net            -1383 ml     EVD , 51 @ 15    Physical Examination:  General: Tired, lying in bed  HEENT: Eyes nonicteric, dressings in place on scalp; ROM intact (though slow), tender over right posterior aspect  CVS: RRR, no murmur  Resp: CTAB  Abd: Soft, nondistended   Extremities: no edema  Neurological Examination  Mentation - alert, attentive, oriented to person, follows commands, speech intact and clear  Cranial nerves - Left homonymous hemianopia, EOMI.  Facial expressions symmetric.  Hearing intact to conversation. No dysarthria.  Palate elevation symmetric.  Strong shoulder shrug bilaterally.  Tongue midline.  Motor exam - Strength 5/5 throughout.  No abnormal movements noted.  Reflexes - not assessed  Sensation - intact to light touch in all four " extremities.  Gait - not assessed    Labs/Studies:  ROUTINE ICU LABS (Last four results)  CMP  Recent Labs  Lab 09/04/18  0340 09/03/18  0208 09/02/18  0710    136 137   POTASSIUM 4.2 3.8 4.1   CHLORIDE 104 103 105   CO2 23 27 23   ANIONGAP 8 6 10   GLC 76 79 78   BUN 7 4* 5*   CR 0.71 0.78 0.76   GFRESTIMATED 89 80 83   GFRESTBLACK >90 >90 >90   EDUIN 8.7 8.2* 9.0   MAG  --  2.0 2.2   PHOS 3.0 3.3 3.2     CBC  Recent Labs  Lab 09/04/18  0340 09/03/18  0208 09/02/18  0710   WBC 6.4 7.4 8.7   RBC 3.43* 3.11* 3.46*   HGB 10.6* 9.6* 10.6*   HCT 33.3* 29.9* 32.9*   MCV 97 96 95   MCH 30.9 30.9 30.6   MCHC 31.8 32.1 32.2   RDW 12.3 12.5 12.4    431 494*     INR  Recent Labs  Lab 09/02/18  0710   INR 1.12     Arterial Blood GasNo lab results found in last 7 days.    Imaging:  Mr Brain W/o & W Contrast -   1. Small rim-enhancing fluid collections in the posterior right cerebral hemisphere along the ventriculostomy catheter tract, most likely small abscess along a catheter tract.   2. Confluent abnormal T2 signal surrounding these collections is favored edema.     CT head 9/4/18  Impression:   1. Stable positioning of right frontal approach ventriculostomy  catheter, without significant change in size of the ventricles since  prior exam.  2. Continued vasogenic edema involving predominantly the white matter  of the right occipital parietal distribution, not significantly  changed from prior exam.  3. No evidence of intracranial hemorrhage.    Assessment/Plan  44 y/o right-handed female w/ PMH of vellum interpositum intracranial arachnoid cyst s/p EVD placement 7/12/2018 c/b seizure and  shunt placement 7/18/2018, unspecified mood disorder, hypothyroidism who was transferred here 9/1/2018 from [place] after a fall at home associated with persisting headache and purulent drainage at occipital  shunt surgical site.     Neuro:  # Shunt infection/Ventriculitis with tract abscess   # S. aureus in CSF  Noted to  have purulent fluid from  shunt, cultures growing staph aureus.   shunt removed, EVD placed on 9/2/2018.  CSF growing heavy growth S. Aureus.    - Continue to monitor EVD and ICPs  - MRI brain shows evidence of small abscess along tract with edema   - See plan in ID below     # Hydrocephalus s/p EVD  H/o arachnoid cyst s/p  shunt placement.   shunt removed, EVD placed  - EVD @ 15  - Neurosurgery following     # Seizure  First seizure post-op 7/12/2018.  Unclear if fall on 8/30/2018 was secondary to breakthrough seizure.  - Keppra 750 BID   - Continue keppra for now, if patient has worsening mentation then consider EEG monitoring.     # Acute on Chronic headache  Likely 2/2 menigismus in the setting of CSF infection.  EVD working for elevated ICP is unlikely.  CT stable (9/4/18).  Will treat symptomatically.    - PRN tylenol and oxy     # Depression  - Holding Wellbutrin 200 at home (recommend to stop medication due to decreased seizure threshold)  - Continue prozac at home dose  - Takes Klonopin PRN for anxiety, monitor closely for withdrawal seizures.     Resp:   - Stable on room air  - Continuous pulse ox monitoring   - Maintain O2 saturations > 92%      CV:    - No issues  - SBP goal: MAP>65     Renal/FEN:   - Strict monitoring of I/O  - Not on any IVFs     Endo:   - No current concerns     Heme:   - No leucocytosis noted  - Hg > 7.0   - Plt > 100k   - INR <1.5      GI:   - No current concerns  - Regular diet     ID:   #Shunt infection/ Ventriculitis with Staph aureus in CSF  CSF culture 9/1/2018 2330 shows heavy growth of Staph aureus.  Initially started on CNS dosing of vancomycin and ceftriaxone.  Cultures growing MSSA.  Last (+) culture 9/2.    - Infectious disease consulted  - Monitor CSF WBCs (0->124->64)  - ID following, appreciated recs  - De-escalate abx per ID     FEN: Regular diet  PPx:- SCDs   Lines: PIV x2, EVD  Code: Full Code    Case seen and discussed with Dr. Sunita Singh MD,  [facility]  Pulmonary & Critical Care, [facility]  [phone]

## 2018-09-04 NOTE — PHARMACY-VANCOMYCIN DOSING SERVICE
Pharmacy Vancomycin Note  Date of Service 2018  Patient's  1975   43 year old, female    Indication: Cerebritis  Goal Trough Level: ~ 20 mg/L  Day of Therapy: 3  Current Vancomycin regimen: 1250 mg IV q8h    Current estimated CrCl = Estimated Creatinine Clearance: 123.1 mL/min (based on Cr of 0.71).    Creatinine for last 3 days  2018:  7:10 AM Creatinine 0.76 mg/dL  9/3/2018:  2:08 AM Creatinine 0.78 mg/dL  2018:  3:40 AM Creatinine 0.71 mg/dL    Recent Vancomycin Levels (past 3 days)  2018:  8:49 AM Vancomycin Level 21.3 mg/L    Vancomycin IV Administrations (past 72 hours)                   vancomycin (VANCOCIN) 1,250 mg in sodium chloride 0.9 % 250 mL intermittent infusion (mg) 1,250 mg New Bag 18 0142     1,250 mg New Bag 18 1755     1,250 mg New Bag  0830     1,250 mg New Bag  0039    vancomycin (VANCOCIN) 2,500 mg in sodium chloride 0.9 % 500 mL intermittent infusion (mg) 2,500 mg New Bag 18 1726                Nephrotoxins and other renal medications (Future)    Start     Dose/Rate Route Frequency Ordered Stop    18 0130  vancomycin (VANCOCIN) 1,250 mg in sodium chloride 0.9 % 250 mL intermittent infusion      1,250 mg  over 90 Minutes Intravenous EVERY 8 HOURS 18 1835               Contrast Orders - past 72 hours (72h ago through future)    Start     Dose/Rate Route Frequency Ordered Stop    18 1445  gadobutrol (GADAVIST) injection 8.82 mL      0.1 mL/kg × 88.2 kg Intravenous ONCE 18 1433 18 1436        Interpretation of levels and current regimen:  Trough level is  Therapeutic (true 8 hour trough ~18 mg/L)  Has serum creatinine changed > 50% in last 72 hours: No  Urine output:  good urine output  Renal Function: Stable    Plan:  1. Continue Current Dose, consider changing to nafcillin 2 g q4h with MSSA identified.   2. Pharmacy will check trough levels as appropriate in 1-3 Days.    3. Serum creatinine levels will be ordered  daily for the first week of therapy and at least twice weekly for subsequent weeks.      Eduardo Pete, PharmD, MS

## 2018-09-04 NOTE — PROGRESS NOTES
Neurosurgery Progress Note    S: severe headaches overnight. Head CT unchanged.    O:  Exam:  General: Awake;  Alert, In No Acute Distress  Pulm: Breathing Comfortably on room air  Mental status: Oriented x 3  Cranial Nerves: Cranial Nerves II-XII Intact Bilaterally with exception of left homonymous hemianopsia  Strength:      Del Tr Bi WE WF Gr  R 5 5 5 5 5 5  L 5 5 5 5 5 5     HF KE KF DF PF EHL  R 5 5 5 5 5 5  L 5 5 5 5 5 5    Pronator Drift: Absent  Sensory: Intact to Light Touch  INCISION: ventriculostomy site covered by primapore     Assessment:   #ventriculitis with associated cerebral edema due to ventriculoperitoneal shunt placement at outside hospital   #history of obstructive hydrocephalus due to cystic lesion     Plan by problem:     Neuro:   Anti-epileptics: continue prior to admission keppra  Drains: EVD @ 15 cm H2O above EAM  Continue prior to admission antidepressant   See ID section for antibiotic treatment of intracranial infection    Cardiovascular: no issues    Pulmonary: Incentive spirometry     Gastrointestinal: regular diet     Renal: monitor intake and output; daily BMP     Heme: daily CBC     Endocrine: continue prior to admission levothyroxine    Infectious ID consulted; appreciate recommendations. Continue ceftriaxone and vancomycin. Serial CSF cultures. ESR and CRP every 72 hours.    PT/OT: pending    DVT prophylaxis: Ambulate TID. Sequential compression devices; chemoprophylaxis held due to bleeding risk     Ulcer prophylaxis: none indicated    DISPO:  pending resolution of critical illness.     Barriers: EVD, critical illness, surgical decision making    -----------------------------------  Luz Mary MD, MS  Neurosurgery PGY-2

## 2018-09-04 NOTE — PLAN OF CARE
Problem: Patient Care Overview  Goal: Plan of Care/Patient Progress Review  Outcome: No Change  D/I: Patient on unit 4A Surgical/Neuro ICU   Neuro- PERRL. L vision field cut remains. Purposeful movement of all extremities. EVD to 15 above. Hourly outputs of 0-39ml. MD notified regarding high output. Pt reminded to call staff if repositioning is needed so EVD can be clamped. Pt had 10/10 headache after high CSF output at appx 2200 -MD made aware- one time IV dilaudid dose of 0.3 given - pt became lethargic after and would arouse only to vigorous stimulation. CT obtained - negative per MD. CSF cultures obtained by MD - notified him of critical low CSF glucose of 37. Pt not following commands at times this am but is observed moving extremities and repositioning self in bed. On multiple occasions, pt stated she would like to be left alone - Importance of neuro exams reiterated to her.  CV- BP stable. HR james at times in high 50s. Tele: SR/SB  Pulm- LS clear  GI- BS hypoactive; pt declined bedtime miralax dose. Passing flatus; no BM  - Voiding adequately per BR  Gtts- None  Skin- Anterior surgical dressing CDI; posterior with small amount of serosanguinous drainage.  Pain- PRN oxycodone, scheduled tylenol, one time dilaudid, ice packs, for headache  IV's - PIV to TKO  See flow sheets for further interventions and assessments.   A: Stable   P: Continue to monitor pt closely. Notify MD of significant changes

## 2018-09-05 ENCOUNTER — APPOINTMENT (OUTPATIENT)
Dept: PHYSICAL THERAPY | Facility: CLINIC | Age: 43
End: 2018-09-05
Attending: NEUROLOGICAL SURGERY
Payer: COMMERCIAL

## 2018-09-05 LAB
ABO + RH BLD: NORMAL
ABO + RH BLD: NORMAL
ANION GAP SERPL CALCULATED.3IONS-SCNC: 6 MMOL/L (ref 3–14)
APPEARANCE CSF: CLEAR
BASOPHILS NFR CSF MANUAL: 1 %
BLD GP AB SCN SERPL QL: NORMAL
BLOOD BANK CMNT PATIENT-IMP: NORMAL
BUN SERPL-MCNC: 9 MG/DL (ref 7–30)
CALCIUM SERPL-MCNC: 8.8 MG/DL (ref 8.5–10.1)
CHLORIDE SERPL-SCNC: 104 MMOL/L (ref 94–109)
CO2 SERPL-SCNC: 29 MMOL/L (ref 20–32)
COLOR CSF: ABNORMAL
CREAT SERPL-MCNC: 0.74 MG/DL (ref 0.52–1.04)
CRP SERPL-MCNC: 48 MG/L (ref 0–8)
EOSINOPHIL NFR CSF MANUAL: 5 %
ERYTHROCYTE [DISTWIDTH] IN BLOOD BY AUTOMATED COUNT: 12.3 % (ref 10–15)
ERYTHROCYTE [SEDIMENTATION RATE] IN BLOOD BY WESTERGREN METHOD: 80 MM/H (ref 0–20)
GFR SERPL CREATININE-BSD FRML MDRD: 85 ML/MIN/1.7M2
GLUCOSE CSF-MCNC: 45 MG/DL (ref 40–70)
GLUCOSE SERPL-MCNC: 77 MG/DL (ref 70–99)
GRAM STN SPEC: NORMAL
HCT VFR BLD AUTO: 32.5 % (ref 35–47)
HGB BLD-MCNC: 10.6 G/DL (ref 11.7–15.7)
LYMPH ABN NFR CSF MANUAL: 37 %
MAGNESIUM SERPL-MCNC: 2.2 MG/DL (ref 1.6–2.3)
MCH RBC QN AUTO: 31.1 PG (ref 26.5–33)
MCHC RBC AUTO-ENTMCNC: 32.6 G/DL (ref 31.5–36.5)
MCV RBC AUTO: 95 FL (ref 78–100)
MONOS+MACROS NFR CSF MANUAL: 47 %
NEUTROPHILS NFR CSF MANUAL: 10 %
PHOSPHATE SERPL-MCNC: 3.2 MG/DL (ref 2.5–4.5)
PLATELET # BLD AUTO: 427 10E9/L (ref 150–450)
POTASSIUM SERPL-SCNC: 3.8 MMOL/L (ref 3.4–5.3)
PROT CSF-MCNC: 66 MG/DL (ref 15–60)
RBC # BLD AUTO: 3.41 10E12/L (ref 3.8–5.2)
RBC # CSF MANUAL: 681 /UL (ref 0–2)
SODIUM SERPL-SCNC: 138 MMOL/L (ref 133–144)
SPECIMEN EXP DATE BLD: NORMAL
SPECIMEN SOURCE: NORMAL
TUBE # CSF: ABNORMAL #
WBC # BLD AUTO: 5.9 10E9/L (ref 4–11)
WBC # CSF MANUAL: 78 /UL (ref 0–5)

## 2018-09-05 PROCEDURE — 20000004 ZZH R&B ICU UMMC

## 2018-09-05 PROCEDURE — 84100 ASSAY OF PHOSPHORUS: CPT | Performed by: INTERNAL MEDICINE

## 2018-09-05 PROCEDURE — 82945 GLUCOSE OTHER FLUID: CPT | Performed by: STUDENT IN AN ORGANIZED HEALTH CARE EDUCATION/TRAINING PROGRAM

## 2018-09-05 PROCEDURE — 87070 CULTURE OTHR SPECIMN AEROBIC: CPT | Performed by: STUDENT IN AN ORGANIZED HEALTH CARE EDUCATION/TRAINING PROGRAM

## 2018-09-05 PROCEDURE — 85652 RBC SED RATE AUTOMATED: CPT | Performed by: INTERNAL MEDICINE

## 2018-09-05 PROCEDURE — 86900 BLOOD TYPING SEROLOGIC ABO: CPT | Performed by: STUDENT IN AN ORGANIZED HEALTH CARE EDUCATION/TRAINING PROGRAM

## 2018-09-05 PROCEDURE — 84157 ASSAY OF PROTEIN OTHER: CPT | Performed by: STUDENT IN AN ORGANIZED HEALTH CARE EDUCATION/TRAINING PROGRAM

## 2018-09-05 PROCEDURE — 97116 GAIT TRAINING THERAPY: CPT | Mod: GP

## 2018-09-05 PROCEDURE — 83735 ASSAY OF MAGNESIUM: CPT | Performed by: INTERNAL MEDICINE

## 2018-09-05 PROCEDURE — 25000132 ZZH RX MED GY IP 250 OP 250 PS 637: Performed by: STUDENT IN AN ORGANIZED HEALTH CARE EDUCATION/TRAINING PROGRAM

## 2018-09-05 PROCEDURE — 87205 SMEAR GRAM STAIN: CPT | Performed by: STUDENT IN AN ORGANIZED HEALTH CARE EDUCATION/TRAINING PROGRAM

## 2018-09-05 PROCEDURE — 25000128 H RX IP 250 OP 636: Performed by: STUDENT IN AN ORGANIZED HEALTH CARE EDUCATION/TRAINING PROGRAM

## 2018-09-05 PROCEDURE — 86850 RBC ANTIBODY SCREEN: CPT | Performed by: STUDENT IN AN ORGANIZED HEALTH CARE EDUCATION/TRAINING PROGRAM

## 2018-09-05 PROCEDURE — 86901 BLOOD TYPING SEROLOGIC RH(D): CPT | Performed by: STUDENT IN AN ORGANIZED HEALTH CARE EDUCATION/TRAINING PROGRAM

## 2018-09-05 PROCEDURE — 25000125 ZZHC RX 250: Performed by: STUDENT IN AN ORGANIZED HEALTH CARE EDUCATION/TRAINING PROGRAM

## 2018-09-05 PROCEDURE — 86140 C-REACTIVE PROTEIN: CPT | Performed by: INTERNAL MEDICINE

## 2018-09-05 PROCEDURE — 40000193 ZZH STATISTIC PT WARD VISIT

## 2018-09-05 PROCEDURE — 97112 NEUROMUSCULAR REEDUCATION: CPT | Mod: GP

## 2018-09-05 PROCEDURE — 97530 THERAPEUTIC ACTIVITIES: CPT | Mod: GP

## 2018-09-05 PROCEDURE — 87075 CULTR BACTERIA EXCEPT BLOOD: CPT | Performed by: STUDENT IN AN ORGANIZED HEALTH CARE EDUCATION/TRAINING PROGRAM

## 2018-09-05 PROCEDURE — 85027 COMPLETE CBC AUTOMATED: CPT | Performed by: INTERNAL MEDICINE

## 2018-09-05 PROCEDURE — 89051 BODY FLUID CELL COUNT: CPT | Performed by: STUDENT IN AN ORGANIZED HEALTH CARE EDUCATION/TRAINING PROGRAM

## 2018-09-05 PROCEDURE — 80048 BASIC METABOLIC PNL TOTAL CA: CPT | Performed by: INTERNAL MEDICINE

## 2018-09-05 RX ORDER — SODIUM CHLORIDE 9 MG/ML
INJECTION, SOLUTION INTRAVENOUS
Status: DISCONTINUED
Start: 2018-09-05 | End: 2018-09-10 | Stop reason: HOSPADM

## 2018-09-05 RX ADMIN — ONDANSETRON 4 MG: 2 INJECTION INTRAMUSCULAR; INTRAVENOUS at 07:35

## 2018-09-05 RX ADMIN — OXYCODONE HYDROCHLORIDE 5 MG: 5 TABLET ORAL at 08:02

## 2018-09-05 RX ADMIN — NAFCILLIN SODIUM 2 G: 2 INJECTION, POWDER, LYOPHILIZED, FOR SOLUTION INTRAMUSCULAR; INTRAVENOUS at 00:20

## 2018-09-05 RX ADMIN — ACETAMINOPHEN 650 MG: 325 TABLET, FILM COATED ORAL at 06:52

## 2018-09-05 RX ADMIN — HYDRALAZINE HYDROCHLORIDE 10 MG: 20 INJECTION INTRAMUSCULAR; INTRAVENOUS at 23:34

## 2018-09-05 RX ADMIN — SENNOSIDES AND DOCUSATE SODIUM 2 TABLET: 8.6; 5 TABLET ORAL at 19:50

## 2018-09-05 RX ADMIN — NAFCILLIN SODIUM 2 G: 2 INJECTION, POWDER, LYOPHILIZED, FOR SOLUTION INTRAMUSCULAR; INTRAVENOUS at 19:50

## 2018-09-05 RX ADMIN — Medication 1125 MG: at 10:47

## 2018-09-05 RX ADMIN — OXYCODONE HYDROCHLORIDE 5 MG: 5 TABLET ORAL at 19:50

## 2018-09-05 RX ADMIN — NAFCILLIN SODIUM 2 G: 2 INJECTION, POWDER, LYOPHILIZED, FOR SOLUTION INTRAMUSCULAR; INTRAVENOUS at 15:52

## 2018-09-05 RX ADMIN — FLUOXETINE 60 MG: 20 CAPSULE ORAL at 08:02

## 2018-09-05 RX ADMIN — NAFCILLIN SODIUM 2 G: 2 INJECTION, POWDER, LYOPHILIZED, FOR SOLUTION INTRAMUSCULAR; INTRAVENOUS at 23:37

## 2018-09-05 RX ADMIN — LEVOTHYROXINE SODIUM 75 MCG: 75 TABLET ORAL at 06:52

## 2018-09-05 RX ADMIN — NAFCILLIN SODIUM 2 G: 2 INJECTION, POWDER, LYOPHILIZED, FOR SOLUTION INTRAMUSCULAR; INTRAVENOUS at 04:12

## 2018-09-05 RX ADMIN — Medication 1125 MG: at 19:50

## 2018-09-05 RX ADMIN — SENNOSIDES AND DOCUSATE SODIUM 2 TABLET: 8.6; 5 TABLET ORAL at 08:02

## 2018-09-05 RX ADMIN — OXYCODONE HYDROCHLORIDE 5 MG: 5 TABLET ORAL at 23:48

## 2018-09-05 RX ADMIN — OXYCODONE HYDROCHLORIDE 5 MG: 5 TABLET ORAL at 04:29

## 2018-09-05 RX ADMIN — POTASSIUM CHLORIDE 20 MEQ: 750 TABLET, EXTENDED RELEASE ORAL at 06:52

## 2018-09-05 RX ADMIN — NAFCILLIN SODIUM 2 G: 2 INJECTION, POWDER, LYOPHILIZED, FOR SOLUTION INTRAMUSCULAR; INTRAVENOUS at 08:16

## 2018-09-05 RX ADMIN — OXYCODONE HYDROCHLORIDE 5 MG: 5 TABLET ORAL at 12:46

## 2018-09-05 RX ADMIN — NAFCILLIN SODIUM 2 G: 2 INJECTION, POWDER, LYOPHILIZED, FOR SOLUTION INTRAMUSCULAR; INTRAVENOUS at 11:00

## 2018-09-05 ASSESSMENT — VISUAL ACUITY
OU: BASELINE

## 2018-09-05 ASSESSMENT — ACTIVITIES OF DAILY LIVING (ADL)
ADLS_ACUITY_SCORE: 11

## 2018-09-05 ASSESSMENT — PAIN DESCRIPTION - DESCRIPTORS: DESCRIPTORS: ACHING

## 2018-09-05 NOTE — PROGRESS NOTES
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    More history obtained about headaches. Patient reports 10-12 year history of pounding frontal headaches which feel like a \"clamp\" around her temples, occurring 1-2x/year, with associated flashing lights and sensitivity to light and sound. In July she had an especially bad headache and \"blacked out,\" which caused her to go to the ED, after which the arachnoid cyst was found and EVD followed by ventriculoperitoneal shunt placed. Initially headaches improved after shunt placement, but in mid-August she started experiencing pounding frontal headaches once again, this time occurring much more frequently, with a headache lasting for 2 days, then going away for 1 day, then coming back. She also started having nausea and vomiting so severe that she couldn't keep water down, which she had not had with previous headaches. Around mid-August she started noticing yellowish drainage from her shunt incision site as well.    Today left peripheral vision improving in superior quadrants but not as much in inferior quadrants.     Problem List  1. Ventriculitis with cerebral edema and right posterior occipital tract abscess   2. Staph aureus in CSF  3. Status post VPS shunt removal and EVD placement  4. History of 3rd ventricular arachnoid cyst s/p VPS 7/2018  5. Headaches  6. New onset seizures     Medications:  Current Facility-Administered Medications   Medication     acetaminophen (TYLENOL) tablet 650 mg     FLUoxetine (PROzac) capsule 60 mg     heparin lock flush 10 UNIT/ML injection 2-5 mL     heparin lock flush 10 UNIT/ML injection 5-10 mL     heparin lock flush 10 UNIT/ML injection 5-10 mL     " hydrALAZINE (APRESOLINE) injection 10-20 mg     labetalol (NORMODYNE/TRANDATE) injection 10-40 mg     levETIRAcetam (KEPPRA) half-tab 1,125 mg     levothyroxine (SYNTHROID/LEVOTHROID) tablet 75 mcg     lidocaine (LMX4) cream     lidocaine (LMX4) cream     lidocaine (LMX4) cream     lidocaine 1 % 1 mL     lidocaine 1 % 1 mL     magnesium sulfate 2 g in NS intermittent infusion ([facility] or [facility] Cmpd)     magnesium sulfate 4 g in 100 mL sterile water (premade)     metoclopramide (REGLAN) tablet 10 mg    Or     metoclopramide (REGLAN) injection 10 mg     nafcillin IV 2 g vial to attach to  ml bag     naloxone (NARCAN) injection 0.1-0.4 mg     ondansetron (ZOFRAN-ODT) ODT tab 4-8 mg    Or     ondansetron (ZOFRAN) injection 4-8 mg     oxyCODONE IR (ROXICODONE) tablet 5 mg     polyethylene glycol (MIRALAX/GLYCOLAX) Packet 17 g     potassium chloride (KLOR-CON) Packet 20-40 mEq     potassium chloride 10 mEq in 100 mL sterile water intermittent infusion (premix)     potassium chloride 20 mEq in 50 mL intermittent infusion     potassium chloride SA (K-DUR/KLOR-CON M) CR tablet 20-40 mEq     potassium phosphate 10 mmol in D5W 250 mL intermittent infusion     potassium phosphate 15 mmol in D5W 250 mL intermittent infusion     potassium phosphate 20 mmol in D5W 250 mL intermittent infusion     potassium phosphate 20 mmol in D5W 500 mL intermittent infusion     potassium phosphate 25 mmol in D5W 500 mL intermittent infusion     prochlorperazine (COMPAZINE) injection 10 mg    Or     prochlorperazine (COMPAZINE) tablet 10 mg     senna-docusate (SENOKOT-S;PERICOLACE) 8.6-50 MG per tablet 2 tablet     sodium chloride (PF) 0.9% PF flush 10-20 mL     sodium chloride (PF) 0.9% PF flush 3 mL     sodium chloride (PF) 0.9% PF flush 3 mL     sodium chloride (PF) 0.9% PF flush 5-50 mL     sodium chloride 0.9 % infusion     24 hour events:  Headache this AM, somewhat improved.  Awake and alert. ROM intact.  EVD @ 20. Given 5mg  "oxycodone this AM    24 Hour Vital Signs Summary:  /90  Pulse 62  Temp 98  F (36.7  C) (Oral)  Resp 12  Ht 1.803 m (5' 11\")  Wt 88.1 kg (194 lb 3.6 oz)  SpO2 98%  BMI 27.09 kg/m2  Resp: 12  Intake/Output Summary (Last 24 hours) at 09/05/18 0904  Last data filed at 09/05/18 0800   Gross per 24 hour   Intake             1730 ml   Output             2793 ml   Net            -1063 ml   EVD     Physical Examination:  General: Tired, lying in bed  HEENT: Eyes nonicteric, dressings in place on scalp; ROM intact (though slow)  Resp: CTAB  Extremities: no edema  Neurological Examination  Mentation - alert, attentive, oriented to person, follows commands, speech intact and clear  Cranial nerves - Left homonymous hemianopia, improving in superior quadrant but not as much in inferior quadrant, EOMI.  Facial expressions symmetric.  Hearing intact to conversation. No dysarthria.  Palate elevation symmetric.  Strong shoulder shrug bilaterally.  Tongue midline.  Motor exam - Strength 5/5 throughout.  No abnormal movements noted.  Reflexes - intact throughout  Sensation - intact to light touch in all four extremities.  Gait - not assessed    Labs/Studies:  Recent Labs  Lab 09/05/18 0431 09/04/18  0849 09/04/18 0340 09/03/18 0208 09/02/18  0710     --  135 136 137   POTASSIUM 3.8  --  4.2 3.8 4.1   CHLORIDE 104  --  104 103 105   CO2 29  --  23 27 23   ANIONGAP 6  --  8 6 10   GLC 77  --  76 79 78   BUN 9  --  7 4* 5*   CR 0.74  --  0.71 0.78 0.76   GFRESTIMATED 85  --  89 80 83   GFRESTBLACK >90  --  >90 >90 >90   EDUIN 8.8  --  8.7 8.2* 9.0   MAG 2.2 2.4*  --  2.0 2.2   PHOS 3.2  --  3.0 3.3 3.2       Recent Labs  Lab 09/05/18  0431 09/04/18  0340 09/03/18  0208 09/02/18  0710   WBC 5.9 6.4 7.4 8.7   RBC 3.41* 3.43* 3.11* 3.46*   HGB 10.6* 10.6* 9.6* 10.6*   HCT 32.5* 33.3* 29.9* 32.9*   MCV 95 97 96 95   MCH 31.1 30.9 30.9 30.6   MCHC 32.6 31.8 32.1 32.2   RDW 12.3 12.3 12.5 12.4    368 431 494* "   CSF 9/5/2018 0431:  WBC 78  Glucose 45  Protein Total 66    Imaging:  Mr Brain W/o & W Contrast -   1. Small rim-enhancing fluid collections in the posterior right cerebral hemisphere along the ventriculostomy catheter tract, most likely small abscess along a catheter tract.   2. Confluent abnormal T2 signal surrounding these collections is favored edema.     CT head 9/4/18  Impression:   1. Stable positioning of right frontal approach ventriculostomy  catheter, without significant change in size of the ventricles since  prior exam.  2. Continued vasogenic edema involving predominantly the white matter  of the right occipital parietal distribution, not significantly  changed from prior exam.  3. No evidence of intracranial hemorrhage.    Assessment/Plan  42 y/o right-handed female w/ PMH of vellum interpositum intracranial arachnoid cyst s/p EVD placement 7/12/2018 c/b seizure and  shunt placement 7/18/2018, unspecified mood disorder, hypothyroidism who was transferred here 9/1/2018 from [place] after a fall at home associated with persisting headache and purulent drainage at occipital  shunt surgical site.     Neuro:  # Shunt infection/Ventriculitis with tract abscess   # S. aureus in CSF  - Noted to have purulent fluid from  shunt, cultures growing staph aureus.    -  shunt removed, EVD placed on 9/2/2018.    - CSF growing heavy growth S. Aureus.    - Continue to monitor EVD and ICPs  - MRI brain shows evidence of small abscess along tract with edema   - See plan in ID below     # Hydrocephalus s/p EVD  H/o arachnoid cyst s/p  shunt placement.   - EVD @ 20  - Neurosurgery following  - Will need to remove EVD and replace with  shunt before pt returns home     # Seizure  - First seizure post-op 7/12/2018.    - Unclear if fall on 8/30/2018 was secondary to breakthrough seizure.  - Keppra 750 BID   - Continue keppra for now, if patient has worsening mentation then consider EEG monitoring.     # Acute  on Chronic headache  - Menigismus much improved this AM.  - PRN tylenol and oxy     # Depression  - Holding Wellbutrin 200 at home (recommend to stop medication due to decreased seizure threshold)  - Continue prozac at home dose  - Takes Klonopin PRN for anxiety, monitor closely for withdrawal seizures.     Resp:   - Stable on room air  - Continuous pulse ox monitoring   - Maintain O2 saturations > 92%      CV:    - No issues  - SBP goal: MAP>65     Renal/FEN:   - Strict monitoring of I/O  - Not on any IVFs     Endo:   - No current concerns     Heme:   - No leukocytosis noted  - Hg > 7.0   - Plt > 100k   - INR <1.5      GI:   - No current concerns  - Regular diet     ID:   #Shunt infection/ Ventriculitis with Staph aureus in CSF  - CSF culture 9/1/2018 2330 shows heavy growth of Staph aureus.    - Initially started on CNS dosing of vancomycin and ceftriaxone.    - Cultures growing MSSA.  Last (+) culture 9/2 (ventricular cath tip)  - Switched to nafcillin 2gm Q4 9/4/18 per ID consult  - Serial CSF cultures from EVD looking for clearance of infection  - Monitor CSF WBCs (0->124->64->78), protein, glucose    FEN: Regular diet  PPx:- SCDs   Lines: PIV x2, EVD  Code: Full Code    Case seen and discussed with Dr. Sunita Steel MD  Vascular Neurology fellow  Pager # [phone]

## 2018-09-05 NOTE — PLAN OF CARE
Problem: Patient Care Overview  Goal: Plan of Care/Patient Progress Review  OT 4AB: Cancel.  Pt sleeping soundly upon attempt, awakens to name though politely declines and requests therapy at another time.

## 2018-09-05 NOTE — PROGRESS NOTES
[facility] INFECTIOUS DISEASES : PROGRESS NOTE  Bandar Olmedo : 1975 Sex: female:   Medical record number [id number] Attending Physician: Antonio Salinas MD  Date of Service: 2018    ASSESSMENT :  43 year old year old female with ADHD, OA, depression, anxiety and  vellum interpositum intracranial arachnoid cyst s/p ventriculoperiotoneal shunt 2018 admitted on 2018 with severe headaches, unresponsiveness and noted to have ventriculitis and edema due to  shunt placed at OSH.     1. Arachnoid cyst detected 2018 s/p EVD and ultimately VPS on 18 at [facility] complicated by ventriculitis -  Staph aureus MSSA  - S/p removal VPS and placement EVD 18.  - CT brain wo contrast  comparison 9/3/18   1. Stable positioning of right frontal approach ventriculostomy catheter, without significant change in size of the ventricles since prior exam.  2. Continued vasogenic edema involving predominantly the white matter of the right occipital parietal distribution, not significantly changed from prior exam.  3. No evidence of intracranial hemorrhage.    CT brain wo contrast 9/3/18  Impression:    1. Rim-enhancing fluid collection seen on MRI are not demonstrated on this CT examination there is continued vasogenic edema involving predominantly the right matter of the right occipital parietal  distribution, not significantly changed from 2018. Continued mass effect and effacement of the right occipital horn.  2. Stable positioning of right frontal approach ventriculostomy catheter, without significant change in size of the ventricles since prior exam.  3. No evidence for intracranial hemorrhage.     - MRI brain w and wo contrast 18   Impression:  1. Small rim-enhancing fluid collections in the posterior right cerebral hemisphere along the ventriculostomy catheter tract, most likely small abscess along a catheter tract.  2. Confluent abnormal T2 signal surrounding  "these collections is favored edema.     [Result: Rim-enhancing fluid collections along the posterior approach ventriculostomy catheter suggesting infected catheter with abscesses.]     2.  Depression/anxiety, pre-existing. Unclear extent to this HPI exacerbates these symptoms. Notably was recent evaluated for commitment after expressing SI following revocation of her license  3. ADHD  4. PICC placed on 9/4/18     RECOMMENDATIONS:  - switch to Nafcillin 2g V q4hr     -  serial CSF cultures from her EVD looking specifically for clearance of infection,  please also send for WBC, protein, glucose so trend can be established and timing for VPS re-insertion (if this is necessary) can be considered    ID will continue to follow. plan discussed with primary team     Rei Austin MD, M.Med.Sc.  Staff, Infectious Diseases  Pager: [phone]     SUBJECTIVE:   mild headaches, getting better. no nausea, vomiting, stiff neck. no diarrhea. poor appetite.     ROS:  A five-point review of systems was obtained and was negative with the exception of that which is described above.  No Known Allergies    CURRENT ANTI-INFECTIVES:   Vanco IV 9/2-->  Ceftriaxone IV 9/2-->     EXAMINATION: (Recommend ? 5 systems)   Vital Signs: /84  Pulse 62  Temp 97.7  F (36.5  C) (Oral)  Resp 12  Ht 1.803 m (5' 11\")  Wt 88.5 kg (195 lb 1.7 oz)  SpO2 97%  BMI 27.21 kg/m2   GENERAL:  alert, oriented,  in bed in no acute distress.  HEENT:  surgical site  EVD  EYES:  Eyes have anicteric sclerae without conjunctival injection   ENT:  Oropharynx is moist without exudates or ulcers. Tongue is midline  NECK:  Supple. No  Cervical lymphadenopathy  LUNGS:  Clear to auscultation bilateral.   CARDIOVASCULAR:  Regular rate and rhythm with no murmurs, gallops or rubs.  ABDOMEN:  Normal bowel sounds, soft, nontender. No appreciable hepatosplenomegaly  SKIN:  No acute rashes.  Line(s) are in place without any surrounding erythema or exudate. "   NEUROLOGIC:  Grossly nonfocal. Active x4 extremities    NEW DATA/RESULTS:   Culture Micro   Date Value Ref Range Status   09/04/2018 Culture negative monitoring continues  Preliminary   09/04/2018 PENDING  Preliminary   09/03/2018 Culture negative monitoring continues  Preliminary   09/03/2018 Culture negative monitoring continues  Preliminary   09/02/2018 Culture negative monitoring continues  Preliminary   09/02/2018 Culture negative after 2 days  Preliminary   09/02/2018 Canceled, Test credited  Final   09/02/2018 Incorrectly ordered by PCU/Clinic  Final   09/02/2018 Test reordered as correct code  Final   09/02/2018 SEE MISCC CULTURE. ME  Final   09/02/2018 Light growth  Staphylococcus aureus   (A)  Final   09/02/2018   Final    Critical Value/Significant Value called to and read back by  Seferino Muller RN at 1033 on 9.3.18 kln.     09/02/2018 Susceptibility testing done on previous specimen  Final     Recent Labs   Lab Test  09/02/18   0710   CRP  64.0*     Recent Labs   Lab Test  09/04/18   0340  09/03/18   0208  09/02/18   0710   WBC  6.4  7.4  8.7     Recent Labs   Lab Test  09/04/18   0340  09/03/18   0208  09/02/18   0710   CR  0.71  0.78  0.76   GFRESTIMATED  89  80  83     Hematology Studies  Recent Labs   Lab Test  09/04/18   0340  09/03/18   0208  09/02/18   0710   WBC  6.4  7.4  8.7   HCT  33.3*  29.9*  32.9*   PLT  368  431  494*     Metabolic  Recent Labs   Lab Test  09/04/18   0340  09/03/18   0208  09/02/18   0710   NA  135  136  137   BUN  7  4*  5*   CO2  23  27  23   CR  0.71  0.78  0.76   GFRESTIMATED  89  80  83     Immunologlobulins  Recent Labs   Lab Test  09/02/18   0710   SED  101*

## 2018-09-05 NOTE — PROGRESS NOTES
Neurosurgery Progress Note    S: continued headaches. Somewhat decreased .    O:  Exam:  General: Awake;  Alert, In No Acute Distress  Pulm: Breathing Comfortably on room air  Mental status: Oriented x 3  Cranial Nerves: Cranial Nerves II-XII Intact Bilaterally with exception of left homonymous hemianopsia  Strength:      Del Tr Bi WE WF Gr  R 5 5 5 5 5 5  L 5 5 5 5 5 5     HF KE KF DF PF EHL  R 5 5 5 5 5 5  L 5 5 5 5 5 5    Pronator Drift: Absent  Sensory: Intact to Light Touch  INCISION: ventriculostomy site covered by primapore     Assessment:   #ventriculitis with associated cerebral edema due to ventriculoperitoneal shunt placement at outside hospital   #history of obstructive hydrocephalus due to cystic lesion     Plan by problem:     Neuro:   Anti-epileptics: continue prior to admission keppra  Drains: EVD @ 20 cm H2O above EAM  Continue prior to admission antidepressant   See ID section for antibiotic treatment of intracranial infection    Cardiovascular: no issues    Pulmonary: Incentive spirometry     Gastrointestinal: regular diet     Renal: monitor intake and output; daily BMP     Heme: daily CBC     Endocrine: continue prior to admission levothyroxine    Infectious ID consulted; appreciate recommendations. Nafcillin for MSSA. Serial CSF cultures. ESR and CRP every 72 hours.    PT/OT: pending    DVT prophylaxis: Ambulate TID. Sequential compression devices; chemoprophylaxis held due to bleeding risk     Ulcer prophylaxis: none indicated    DISPO:  pending resolution of critical illness.     Barriers: EVD, critical illness, surgical decision making      -----------------------------------  Luz Mary MD, MS  Neurosurgery PGY-2

## 2018-09-05 NOTE — PLAN OF CARE
Neuro - Exam intact aside from left field cut, which is improving.  EVD at 20 above.  No output unless pt gets up without warning and EVD isn't clamped.  ICP's 0-6, output 0 to 15.  Zofran X 1, oxy X 1.      Cardiac - SB to SR, rare PVC.  Afebrile, pulses 2+, no edema.    Resp - Room air, clear over dim.    GI - Regular diet, well tolerated.  BM X 1.     - Voiding adequate urine.  Pt started menses today.      Skin - EVD and incision sites WDL.      Daughter visited pt at bedside.

## 2018-09-05 NOTE — PROGRESS NOTES
[facility] INFECTIOUS DISEASES : PROGRESS NOTE  Bandar Olmedo : 1975 Sex: female:   Medical record number [id number] Attending Physician: Antonio aSlinas MD  Date of Service: 2018    ASSESSMENT :  43 year old year old female with ADHD, OA, depression, anxiety and  vellum interpositum intracranial arachnoid cyst s/p ventriculoperiotoneal shunt 2018 admitted on 2018 with severe headaches, unresponsiveness and noted to have ventriculitis and edema due to  shunt placed at OSH.   1. Arachnoid cyst detected 2018 s/p EVD and ultimately VPS on 18 at [facility] complicated by ventriculitis -  Staph aureus MSSA  - S/p removal VPS and placement EVD 18.  - CT brain wo contrast  comparison 9/3/18   1. Stable positioning of right frontal approach ventriculostomy catheter, without significant change in size of the ventricles since prior exam.  2. Continued vasogenic edema involving predominantly the white matter of the right occipital parietal distribution, not significantly changed from prior exam.  3. No evidence of intracranial hemorrhage.    CT brain wo contrast 9/3/18  Impression:    1. Rim-enhancing fluid collection seen on MRI are not demonstrated on this CT examination there is continued vasogenic edema involving predominantly the right matter of the right occipital parietal  distribution, not significantly changed from 2018. Continued mass effect and effacement of the right occipital horn.  2. Stable positioning of right frontal approach ventriculostomy catheter, without significant change in size of the ventricles since prior exam.  3. No evidence for intracranial hemorrhage.     - MRI brain w and wo contrast 18   Impression:  1. Small rim-enhancing fluid collections in the posterior right cerebral hemisphere along the ventriculostomy catheter tract, most likely small abscess along a catheter tract.  2. Confluent abnormal T2 signal surrounding  "these collections is favored edema.    2.  Depression/anxiety, pre-existing. Unclear extent to this HPI exacerbates these symptoms. Notably was recent evaluated for commitment after expressing SI following revocation of her license  3. ADHD  4. PICC placed on 9/4/18     RECOMMENDATIONS:  -  continue Nafcillin 2g V q4hr     -  serial CSF cultures from her EVD looking specifically for clearance of infection,  please also send for WBC, protein, glucose so trend can be established and timing for VPS re-insertion (if this is necessary) can be considered    ID will continue to follow.     Rei Austin MD, M.Med.Sc.  Staff, Infectious Diseases  Pager: [phone]     SUBJECTIVE:    headaches - are better, no neck stiffness. feels  better. no nausea, vomiting,  no diarrhea.     ROS:  A five-point review of systems was obtained and was negative with the exception of that which is described above.  No Known Allergies    CURRENT ANTI-INFECTIVES:   Vanco IV 9/2-->  Ceftriaxone IV 9/2-->     EXAMINATION: (Recommend ? 5 systems)   Vital Signs: /78  Pulse 62  Temp 98  F (36.7  C) (Oral)  Resp 12  Ht 1.803 m (5' 11\")  Wt 88.1 kg (194 lb 3.6 oz)  SpO2 98%  BMI 27.09 kg/m2   GENERAL:  alert, oriented,  in bed in no acute distress.  HEENT:  surgical site  EVD  EYES:  Eyes have anicteric sclerae without conjunctival injection   ENT:  Oropharynx is moist without exudates or ulcers. Tongue is midline  NECK:  Supple. No  Cervical lymphadenopathy  LUNGS:  Clear to auscultation bilateral.   CARDIOVASCULAR:  Regular rate and rhythm with no murmurs, gallops or rubs.  ABDOMEN:  Normal bowel sounds, soft, nontender. No appreciable hepatosplenomegaly  SKIN:  No acute rashes.  Line(s) are in place without any surrounding erythema or exudate.   NEUROLOGIC:  Grossly nonfocal. Active x4 extremities    NEW DATA/RESULTS:   Culture Micro   Date Value Ref Range Status   09/05/2018 PENDING  Preliminary   09/05/2018 Culture " negative monitoring continues  Preliminary   09/04/2018 Culture negative monitoring continues  Preliminary   09/04/2018 Culture negative monitoring continues  Preliminary   09/03/2018 Culture negative monitoring continues  Preliminary   09/03/2018 Culture negative monitoring continues  Preliminary     Recent Labs   Lab Test  09/05/18   0431  09/02/18   0710   CRP  48.0*  64.0*     Recent Labs   Lab Test  09/05/18   0431  09/04/18   0340  09/03/18   0208  09/02/18   0710   WBC  5.9  6.4  7.4  8.7     Recent Labs   Lab Test  09/05/18   0431  09/04/18   0340  09/03/18   0208  09/02/18   0710   CR  0.74  0.71  0.78  0.76   GFRESTIMATED  85  89  80  83     Hematology Studies  Recent Labs   Lab Test  09/05/18   0431 09/04/18   0340  09/03/18   0208  09/02/18   0710   WBC  5.9  6.4  7.4  8.7   HCT  32.5*  33.3*  29.9*  32.9*   PLT  427  368  431  494*     Metabolic  Recent Labs   Lab Test  09/05/18   0431  09/04/18   0340  09/03/18   0208   NA  138  135  136   BUN  9  7  4*   CO2  29  23  27   CR  0.74  0.71  0.78   GFRESTIMATED  85  89  80     Immunologlobulins  Recent Labs   Lab Test  09/05/18   0431  09/02/18   0710   SED  80*  101*

## 2018-09-05 NOTE — PLAN OF CARE
Problem: Patient Care Overview  Goal: Plan of Care/Patient Progress Review  Outcome: Improving  D/I: Patient on unit 4A Surgical/Neuro ICU   Neuro- PERRL. L vision field cut remains. Purposeful movement of all extremities. EVD @ 15 above. Hourly outputs of 0-14ml. ICPs 0-5  Pulm- LS clear  GI- BS hypoactive; pt declined bedtime miralax dose again. Did take stool softener. Passing flatus; no BM  - Voiding adequately per BR  Gtts- None  Skin- Anterior surgical dressing CDI; posterior with small amount of serosanguinous drainage.  Pain- PRN oxycodone, PRN tylenol effective for headache  IV's - PICC to TKO. L PIV saline locked  K replaced this am  See flow sheets for further interventions and assessments.   A: Stable   P: Continue to monitor pt closely. Notify MD of significant changes

## 2018-09-05 NOTE — PLAN OF CARE
Problem: Patient Care Overview  Goal: Plan of Care/Patient Progress Review  Discharge Planner PT   Patient plan for discharge: home   Current status: EVD clamped. Pt performed bed mobility and supine>sit with HOB elevated. Completed STS with SBA and minor use of UE for support. Pt ambualted 400'+ with intermittent use of IV pole for UE support. Cued pt for compensation strategies 2/2 L field cut. Pt completed dynamic balance drills to promote improvement in functional stability - compensated appropriately with no overt LOB. AVSS.   Barriers to return to prior living situation: medical needs   Recommendations for discharge: Anticipate home with 24/7 A and pending ongoing progress, OP PT for further progression of mobility.   Rationale for recommendations: see above.       Entered by: Teresa De Jesus 09/05/2018 9:49 AM

## 2018-09-06 ENCOUNTER — APPOINTMENT (OUTPATIENT)
Dept: OCCUPATIONAL THERAPY | Facility: CLINIC | Age: 43
End: 2018-09-06
Attending: NEUROLOGICAL SURGERY
Payer: COMMERCIAL

## 2018-09-06 LAB
ALBUMIN SERPL-MCNC: 2.8 G/DL (ref 3.4–5)
ALP SERPL-CCNC: 92 U/L (ref 40–150)
ALT SERPL W P-5'-P-CCNC: 31 U/L (ref 0–50)
ANION GAP SERPL CALCULATED.3IONS-SCNC: 8 MMOL/L (ref 3–14)
APPEARANCE CSF: ABNORMAL
AST SERPL W P-5'-P-CCNC: 18 U/L (ref 0–45)
BACTERIA SPEC CULT: ABNORMAL
BILIRUB DIRECT SERPL-MCNC: <0.1 MG/DL (ref 0–0.2)
BILIRUB SERPL-MCNC: 0.3 MG/DL (ref 0.2–1.3)
BUN SERPL-MCNC: 8 MG/DL (ref 7–30)
CALCIUM SERPL-MCNC: 9.1 MG/DL (ref 8.5–10.1)
CHLORIDE SERPL-SCNC: 104 MMOL/L (ref 94–109)
CO2 SERPL-SCNC: 27 MMOL/L (ref 20–32)
COLOR CSF: ABNORMAL
CREAT SERPL-MCNC: 0.64 MG/DL (ref 0.52–1.04)
ERYTHROCYTE [DISTWIDTH] IN BLOOD BY AUTOMATED COUNT: 12.4 % (ref 10–15)
GFR SERPL CREATININE-BSD FRML MDRD: >90 ML/MIN/1.7M2
GLUCOSE CSF-MCNC: 44 MG/DL (ref 40–70)
GLUCOSE SERPL-MCNC: 84 MG/DL (ref 70–99)
GRAM STN SPEC: NORMAL
HCT VFR BLD AUTO: 33.7 % (ref 35–47)
HGB BLD-MCNC: 10.9 G/DL (ref 11.7–15.7)
LYMPH ABN NFR CSF MANUAL: 75 %
Lab: NORMAL
MAGNESIUM SERPL-MCNC: 2.2 MG/DL (ref 1.6–2.3)
MCH RBC QN AUTO: 30.8 PG (ref 26.5–33)
MCHC RBC AUTO-ENTMCNC: 32.3 G/DL (ref 31.5–36.5)
MCV RBC AUTO: 95 FL (ref 78–100)
NEUTROPHILS NFR CSF MANUAL: 8 %
OTHER CELLS CSF: 17 %
PHOSPHATE SERPL-MCNC: 3.8 MG/DL (ref 2.5–4.5)
PLATELET # BLD AUTO: 449 10E9/L (ref 150–450)
POTASSIUM SERPL-SCNC: 3.6 MMOL/L (ref 3.4–5.3)
PROT CSF-MCNC: 66 MG/DL (ref 15–60)
PROT SERPL-MCNC: 7.2 G/DL (ref 6.8–8.8)
RBC # BLD AUTO: 3.54 10E12/L (ref 3.8–5.2)
RBC # CSF MANUAL: ABNORMAL /UL (ref 0–2)
SODIUM SERPL-SCNC: 138 MMOL/L (ref 133–144)
SPECIMEN SOURCE: ABNORMAL
SPECIMEN SOURCE: NORMAL
TUBE # CSF: 1 #
WBC # BLD AUTO: 6 10E9/L (ref 4–11)
WBC # CSF MANUAL: 63 /UL (ref 0–5)

## 2018-09-06 PROCEDURE — 87075 CULTR BACTERIA EXCEPT BLOOD: CPT | Performed by: STUDENT IN AN ORGANIZED HEALTH CARE EDUCATION/TRAINING PROGRAM

## 2018-09-06 PROCEDURE — 80048 BASIC METABOLIC PNL TOTAL CA: CPT | Performed by: INTERNAL MEDICINE

## 2018-09-06 PROCEDURE — 97535 SELF CARE MNGMENT TRAINING: CPT | Mod: GO

## 2018-09-06 PROCEDURE — 84100 ASSAY OF PHOSPHORUS: CPT | Performed by: INTERNAL MEDICINE

## 2018-09-06 PROCEDURE — 25000128 H RX IP 250 OP 636: Performed by: STUDENT IN AN ORGANIZED HEALTH CARE EDUCATION/TRAINING PROGRAM

## 2018-09-06 PROCEDURE — 25000132 ZZH RX MED GY IP 250 OP 250 PS 637: Performed by: STUDENT IN AN ORGANIZED HEALTH CARE EDUCATION/TRAINING PROGRAM

## 2018-09-06 PROCEDURE — 25000125 ZZHC RX 250: Performed by: STUDENT IN AN ORGANIZED HEALTH CARE EDUCATION/TRAINING PROGRAM

## 2018-09-06 PROCEDURE — 83735 ASSAY OF MAGNESIUM: CPT | Performed by: INTERNAL MEDICINE

## 2018-09-06 PROCEDURE — 20000004 ZZH R&B ICU UMMC

## 2018-09-06 PROCEDURE — 87205 SMEAR GRAM STAIN: CPT | Performed by: STUDENT IN AN ORGANIZED HEALTH CARE EDUCATION/TRAINING PROGRAM

## 2018-09-06 PROCEDURE — 80076 HEPATIC FUNCTION PANEL: CPT | Performed by: INTERNAL MEDICINE

## 2018-09-06 PROCEDURE — 85027 COMPLETE CBC AUTOMATED: CPT | Performed by: INTERNAL MEDICINE

## 2018-09-06 PROCEDURE — 87070 CULTURE OTHR SPECIMN AEROBIC: CPT | Performed by: STUDENT IN AN ORGANIZED HEALTH CARE EDUCATION/TRAINING PROGRAM

## 2018-09-06 PROCEDURE — 82945 GLUCOSE OTHER FLUID: CPT | Performed by: STUDENT IN AN ORGANIZED HEALTH CARE EDUCATION/TRAINING PROGRAM

## 2018-09-06 PROCEDURE — 84157 ASSAY OF PROTEIN OTHER: CPT | Performed by: STUDENT IN AN ORGANIZED HEALTH CARE EDUCATION/TRAINING PROGRAM

## 2018-09-06 PROCEDURE — 97110 THERAPEUTIC EXERCISES: CPT | Mod: GO

## 2018-09-06 PROCEDURE — 40000133 ZZH STATISTIC OT WARD VISIT

## 2018-09-06 RX ORDER — RIFAMPIN 300 MG/1
300 CAPSULE ORAL EVERY 12 HOURS SCHEDULED
Status: DISCONTINUED | OUTPATIENT
Start: 2018-09-06 | End: 2018-09-12 | Stop reason: HOSPADM

## 2018-09-06 RX ORDER — BUPROPION HYDROCHLORIDE 200 MG/1
200 TABLET, EXTENDED RELEASE ORAL DAILY
Status: DISCONTINUED | OUTPATIENT
Start: 2018-09-06 | End: 2018-09-10

## 2018-09-06 RX ORDER — LEVETIRACETAM 500 MG/1
1000 TABLET ORAL 2 TIMES DAILY
Status: DISCONTINUED | OUTPATIENT
Start: 2018-09-06 | End: 2018-09-12 | Stop reason: HOSPADM

## 2018-09-06 RX ORDER — SACCHAROMYCES BOULARDII 250 MG
250 CAPSULE ORAL 2 TIMES DAILY
Status: DISCONTINUED | OUTPATIENT
Start: 2018-09-06 | End: 2018-09-12 | Stop reason: HOSPADM

## 2018-09-06 RX ADMIN — ACETAMINOPHEN 650 MG: 325 TABLET, FILM COATED ORAL at 00:46

## 2018-09-06 RX ADMIN — LEVETIRACETAM 1000 MG: 500 TABLET ORAL at 19:48

## 2018-09-06 RX ADMIN — Medication 1125 MG: at 08:32

## 2018-09-06 RX ADMIN — OXYCODONE HYDROCHLORIDE 5 MG: 5 TABLET ORAL at 11:50

## 2018-09-06 RX ADMIN — Medication 250 MG: at 19:48

## 2018-09-06 RX ADMIN — RIFAMPIN 300 MG: 300 CAPSULE ORAL at 11:42

## 2018-09-06 RX ADMIN — ACETAMINOPHEN 650 MG: 325 TABLET, FILM COATED ORAL at 18:07

## 2018-09-06 RX ADMIN — FLUOXETINE 60 MG: 20 CAPSULE ORAL at 08:25

## 2018-09-06 RX ADMIN — ACETAMINOPHEN 650 MG: 325 TABLET, FILM COATED ORAL at 14:05

## 2018-09-06 RX ADMIN — ONDANSETRON 4 MG: 2 INJECTION INTRAMUSCULAR; INTRAVENOUS at 18:07

## 2018-09-06 RX ADMIN — NAFCILLIN SODIUM 2 G: 2 INJECTION, POWDER, LYOPHILIZED, FOR SOLUTION INTRAMUSCULAR; INTRAVENOUS at 11:41

## 2018-09-06 RX ADMIN — NAFCILLIN SODIUM 2 G: 2 INJECTION, POWDER, LYOPHILIZED, FOR SOLUTION INTRAMUSCULAR; INTRAVENOUS at 16:55

## 2018-09-06 RX ADMIN — NAFCILLIN SODIUM 2 G: 2 INJECTION, POWDER, LYOPHILIZED, FOR SOLUTION INTRAMUSCULAR; INTRAVENOUS at 08:36

## 2018-09-06 RX ADMIN — LEVOTHYROXINE SODIUM 75 MCG: 75 TABLET ORAL at 08:25

## 2018-09-06 RX ADMIN — SENNOSIDES AND DOCUSATE SODIUM 2 TABLET: 8.6; 5 TABLET ORAL at 19:48

## 2018-09-06 RX ADMIN — ACETAMINOPHEN 650 MG: 325 TABLET, FILM COATED ORAL at 22:15

## 2018-09-06 RX ADMIN — NAFCILLIN SODIUM 2 G: 2 INJECTION, POWDER, LYOPHILIZED, FOR SOLUTION INTRAMUSCULAR; INTRAVENOUS at 19:48

## 2018-09-06 RX ADMIN — OXYCODONE HYDROCHLORIDE 5 MG: 5 TABLET ORAL at 18:07

## 2018-09-06 RX ADMIN — ACETAMINOPHEN 650 MG: 325 TABLET, FILM COATED ORAL at 08:51

## 2018-09-06 RX ADMIN — RIFAMPIN 300 MG: 300 CAPSULE ORAL at 19:51

## 2018-09-06 RX ADMIN — POTASSIUM CHLORIDE 20 MEQ: 29.8 INJECTION, SOLUTION INTRAVENOUS at 06:16

## 2018-09-06 RX ADMIN — NAFCILLIN SODIUM 2 G: 2 INJECTION, POWDER, LYOPHILIZED, FOR SOLUTION INTRAMUSCULAR; INTRAVENOUS at 04:16

## 2018-09-06 RX ADMIN — BUPROPION HYDROCHLORIDE 200 MG: 200 TABLET, EXTENDED RELEASE ORAL at 20:01

## 2018-09-06 ASSESSMENT — ACTIVITIES OF DAILY LIVING (ADL)
ADLS_ACUITY_SCORE: 11
ADLS_ACUITY_SCORE: 10
ADLS_ACUITY_SCORE: 9
ADLS_ACUITY_SCORE: 11
ADLS_ACUITY_SCORE: 11
ADLS_ACUITY_SCORE: 9

## 2018-09-06 ASSESSMENT — VISUAL ACUITY
OU: BASELINE
OU: BASELINE

## 2018-09-06 NOTE — PROGRESS NOTES
Neuro: q2h neuro exam. EVD @ 20 above EAM. EVD clamped @ 0730, pt tolerating well. ICPs 0-5. Moves all extremities with equal 5/5 strength. PTA wellbutrin to be restarted this evening.   CV: bradycardic, team aware. SBP goal < 140. No PRNs given this shift for BP. Afebrile.  Resp: Sats 97% and greater on RA. LS clear and equal bilaterally.  GI/: Voids spontaneously and without issue, SBA to bathroom d/t lines/EVD. No BM this shift. Stool meds held d/t loose stool overnight and pt refusal.  BS active and audible. 1/2 of lunch eaten.  Skin: Incision on posterior scalp, clean, MYRNA and without drainage. Dressing covering EVD site C/D/I.  Gtts: TKO.    Continue to monitor and notify neurosurg with changes.

## 2018-09-06 NOTE — PROGRESS NOTES
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   1. Arachnoid cyst detected 2018 s/p EVD and ultimately VPS on 18 at [facility] complicated by ventriculitis -  Staph aureus MSSA  - S/p removal VPS and placement EVD 18.  - CT brain wo contrast  comparison 9/3/18   1. Stable positioning of right frontal approach ventriculostomy catheter, without significant change in size of the ventricles since prior exam.  2. Continued vasogenic edema involving predominantly the white matter of the right occipital parietal distribution, not significantly changed from prior exam.  3. No evidence of intracranial hemorrhage.    CT brain wo contrast 9/3/18  Impression:    1. Rim-enhancing fluid collection seen on MRI are not demonstrated on this CT examination there is continued vasogenic edema involving predominantly the right matter of the right occipital parietal  distribution, not significantly changed from 2018. Continued mass effect and effacement of the right occipital horn.  2. Stable positioning of right frontal approach ventriculostomy catheter, without significant change in size of the ventricles since prior exam.  3. No evidence for intracranial hemorrhage.     - MRI brain w and wo contrast 18   Impression:  1. Small rim-enhancing fluid collections in the posterior right cerebral hemisphere along the ventriculostomy catheter tract, most likely small abscess along a catheter tract.  2. Confluent abnormal T2 signal surrounding  "these collections is favored edema.    2.  Depression/anxiety, pre-existing. Unclear extent to this HPI exacerbates these symptoms. Notably was recent evaluated for commitment after expressing SI following revocation of her license  3. ADHD  4. PICC placed on 9/4/18     RECOMMENDATIONS:  -  continue Nafcillin 2g V q4hr   and add Rifampin 300 mg PO q12hr   -  serial CSF cultures from her EVD looking specifically for clearance of infection, may replace shunt if CSF cultures remain neg  after  10 days   - monitor LFTs  - probiotic daily   - patient reminded not to touch the surgical site    ID will continue to follow. Plan discussed with primary team     Rei Austin MD, M.Med.Sc.  Staff, Infectious Diseases  Pager: [phone]     SUBJECTIVE:    headaches - are better, but had headaches earlier. no neck stiffness.  no nausea, vomiting,  no diarrhea.      ROS:  A five-point review of systems was obtained and was negative with the exception of that which is described above.  No Known Allergies    CURRENT ANTI-INFECTIVES:   Vanco IV 9/2-->9/4   Ceftriaxone IV 9/2--> 9/4  Nafcillin 9/4 -->     EXAMINATION: (Recommend ? 5 systems)   Vital Signs: BP (!) 124/93 (BP Location: Right arm)  Pulse 62  Temp 97.6  F (36.4  C) (Axillary)  Resp 14  Ht 1.803 m (5' 11\")  Wt 85.2 kg (187 lb 13.3 oz)  SpO2 97%  BMI 26.2 kg/m2   GENERAL:  alert, oriented,  in bed in no acute distress.  HEENT:  surgical site  EVD  EYES:  Eyes have anicteric sclerae without conjunctival injection   ENT:  Oropharynx is moist without exudates or ulcers. Tongue is midline  NECK:  Supple. No  Cervical lymphadenopathy  LUNGS:  Clear to auscultation bilateral.   CARDIOVASCULAR:  Regular rate and rhythm with no murmurs, gallops or rubs.  ABDOMEN:  Normal bowel sounds, soft, nontender. No appreciable hepatosplenomegaly  SKIN:  No acute rashes.  Line(s) are in place without any surrounding erythema or exudate.   NEUROLOGIC:  Grossly nonfocal. " Active x4 extremities    NEW DATA/RESULTS:   Culture Micro   Date Value Ref Range Status   09/06/2018 PENDING  Preliminary   09/05/2018 Culture negative monitoring continues  Preliminary   09/05/2018 Culture negative monitoring continues  Preliminary   09/04/2018 Culture negative monitoring continues  Preliminary   09/04/2018 Culture negative monitoring continues  Preliminary     Recent Labs   Lab Test  09/05/18   0431  09/02/18   0710   CRP  48.0*  64.0*     Recent Labs   Lab Test  09/06/18   0413  09/05/18   0431  09/04/18   0340  09/03/18   0208  09/02/18   0710   WBC  6.0  5.9  6.4  7.4  8.7     Recent Labs   Lab Test  09/06/18   0413  09/05/18   0431  09/04/18   0340  09/03/18   0208   CR  0.64  0.74  0.71  0.78   GFRESTIMATED  >90  85  89  80     Hematology Studies  Recent Labs   Lab Test  09/06/18   0413  09/05/18   0431  09/04/18   0340  09/03/18   0208  09/02/18   0710   WBC  6.0  5.9  6.4  7.4  8.7   HCT  33.7*  32.5*  33.3*  29.9*  32.9*   PLT  449  427  368  431  494*     Metabolic  Recent Labs   Lab Test  09/06/18   0413  09/05/18   0431  09/04/18   0340   NA  138  138  135   BUN  8  9  7   CO2  27  29  23   CR  0.64  0.74  0.71   GFRESTIMATED  >90  85  89     Immunologlobulins  Recent Labs   Lab Test  09/05/18   0431  09/02/18   0710   SED  80*  101*

## 2018-09-06 NOTE — PROGRESS NOTES
Neuroscience Intensive Care Progress Note    09/06/2018    Bandar Olmedo is a 43 year old year old female with h/o vellum interpositum intracranial arachnoid cyst s/p ventriculoperiotoneal shunt 7/18/2018 admitted on 9/1/2018 with worsening headaches and noted to have ventriculitis and edema due to  shunt placed at OSH.    Problem List  1. Ventriculitis with cerebral edema and right posterior occipital tract abscess   2. Staph aureus in CSF  3. Status post VPS shunt removal and EVD placement  4. History of 3rd ventricular arachnoid cyst s/p VPS 7/2018  5. Headaches  6. New onset seizures     Medications:  Current Facility-Administered Medications   Medication     acetaminophen (TYLENOL) tablet 650 mg     FLUoxetine (PROzac) capsule 60 mg     heparin lock flush 10 UNIT/ML injection 2-5 mL     heparin lock flush 10 UNIT/ML injection 5-10 mL     heparin lock flush 10 UNIT/ML injection 5-10 mL     hydrALAZINE (APRESOLINE) injection 10-20 mg     labetalol (NORMODYNE/TRANDATE) injection 10-40 mg     levETIRAcetam (KEPPRA) half-tab 1,125 mg     levothyroxine (SYNTHROID/LEVOTHROID) tablet 75 mcg     lidocaine (LMX4) cream     lidocaine (LMX4) cream     lidocaine (LMX4) cream     lidocaine 1 % 1 mL     lidocaine 1 % 1 mL     magnesium sulfate 2 g in NS intermittent infusion ([facility]arMEDium or [facility] Cmpd)     magnesium sulfate 4 g in 100 mL sterile water (premade)     metoclopramide (REGLAN) tablet 10 mg    Or     metoclopramide (REGLAN) injection 10 mg     nafcillin IV 2 g vial to attach to  ml bag     naloxone (NARCAN) injection 0.1-0.4 mg     ondansetron (ZOFRAN-ODT) ODT tab 4-8 mg    Or     ondansetron (ZOFRAN) injection 4-8 mg     oxyCODONE IR (ROXICODONE) tablet 5 mg     polyethylene glycol (MIRALAX/GLYCOLAX) Packet 17 g     potassium chloride (KLOR-CON) Packet 20-40 mEq     potassium chloride 10 mEq in 100 mL sterile water intermittent infusion (premix)     potassium chloride 20 mEq in 50 mL intermittent infusion  "    potassium chloride SA (K-DUR/KLOR-CON M) CR tablet 20-40 mEq     potassium phosphate 10 mmol in D5W 250 mL intermittent infusion     potassium phosphate 15 mmol in D5W 250 mL intermittent infusion     potassium phosphate 20 mmol in D5W 250 mL intermittent infusion     potassium phosphate 20 mmol in D5W 500 mL intermittent infusion     potassium phosphate 25 mmol in D5W 500 mL intermittent infusion     prochlorperazine (COMPAZINE) injection 10 mg    Or     prochlorperazine (COMPAZINE) tablet 10 mg     rifampin (RIFADIN) capsule 300 mg     senna-docusate (SENOKOT-S;PERICOLACE) 8.6-50 MG per tablet 2 tablet     sodium chloride (PF) 0.9% PF flush 10-20 mL     sodium chloride (PF) 0.9% PF flush 3 mL     sodium chloride (PF) 0.9% PF flush 3 mL     sodium chloride (PF) 0.9% PF flush 5-50 mL     24 hour events:  Awake and alert    24 Hour Vital Signs Summary:  /83 (BP Location: Right arm)  Pulse 62  Temp 98.4  F (36.9  C) (Oral)  Resp 20  Ht 1.803 m (5' 11\")  Wt 85.2 kg (187 lb 13.3 oz)  SpO2 95%  BMI 26.2 kg/m2  Resp: 20   Intake/Output Summary (Last 24 hours) at 09/06/18 0943  Last data filed at 09/06/18 0800   Gross per 24 hour   Intake             2030 ml   Output             5115 ml   Net            -3085 ml   EVD     Physical Examination:  General: Tired, lying in bed  HEENT: Eyes nonicteric, dressings in place on scalp; ROM intact (though slow)  Resp: CTAB  Extremities: no edema  Neurological Examination  Mentation - alert, attentive, oriented to person, follows commands, speech intact and clear. Some meningismus present  Cranial nerves - Left homonymous hemianopia, still seems improved in superior quadrant but not as much in inferior quadrant, EOMI.  Facial expressions symmetric.  Hearing intact to conversation. No dysarthria.  Palate elevation symmetric.  Strong shoulder shrug bilaterally.  Tongue midline.  Motor exam - Strength 5/5 throughout.  No abnormal movements noted.  Reflexes - not " assessed  Sensation - intact to light touch in all four extremities.  Gait - not assessed  Pronator Drift - absent    Labs/Studies:  Recent Labs  Lab 09/06/18 0413 09/05/18 0431 09/04/18  0849 09/04/18 0340 09/03/18  0208    138  --  135 136   POTASSIUM 3.6 3.8  --  4.2 3.8   CHLORIDE 104 104  --  104 103   CO2 27 29  --  23 27   ANIONGAP 8 6  --  8 6   GLC 84 77  --  76 79   BUN 8 9  --  7 4*   CR 0.64 0.74  --  0.71 0.78   GFRESTIMATED >90 85  --  89 80   GFRESTBLACK >90 >90  --  >90 >90   EDUIN 9.1 8.8  --  8.7 8.2*   MAG 2.2 2.2 2.4*  --  2.0   PHOS 3.8 3.2  --  3.0 3.3   PROTTOTAL 7.2  --   --   --   --    ALBUMIN 2.8*  --   --   --   --    BILITOTAL 0.3  --   --   --   --    ALKPHOS 92  --   --   --   --    AST 18  --   --   --   --    ALT 31  --   --   --   --        Recent Labs  Lab 09/06/18 0413 09/05/18 0431 09/04/18 0340 09/03/18  0208   WBC 6.0 5.9 6.4 7.4   RBC 3.54* 3.41* 3.43* 3.11*   HGB 10.9* 10.6* 10.6* 9.6*   HCT 33.7* 32.5* 33.3* 29.9*   MCV 95 95 97 96   MCH 30.8 31.1 30.9 30.9   MCHC 32.3 32.6 31.8 32.1   RDW 12.4 12.3 12.3 12.5    427 368 431   CSF 9/6/2018 0413:  WBC 63  Glucose 44  Protein Total 66  CSF 9/5/2018 0431:  WBC 78  Glucose 45  Protein Total 66  No organisms seen on Gram stain, 9/6/2018 0415    Imaging:  Mr Brain W/o & W Contrast -   1. Small rim-enhancing fluid collections in the posterior right cerebral hemisphere along the ventriculostomy catheter tract, most likely small abscess along a catheter tract.   2. Confluent abnormal T2 signal surrounding these collections is favored edema.     CT head 9/4/18  Impression:   1. Stable positioning of right frontal approach ventriculostomy  catheter, without significant change in size of the ventricles since  prior exam.  2. Continued vasogenic edema involving predominantly the white matter  of the right occipital parietal distribution, not significantly  changed from prior exam.  3. No evidence of intracranial  hemorrhage.    Assessment/Plan  44 y/o right-handed female w/ PMH of vellum interpositum intracranial arachnoid cyst s/p EVD placement 7/12/2018 c/b seizure and  shunt placement 7/18/2018, unspecified mood disorder, hypothyroidism who was transferred here 9/1/2018 from [place] after a fall at home associated with persisting headache and purulent drainage at occipital  shunt surgical site.     Neuro:  # Shunt infection/Ventriculitis with tract abscess   # S. aureus in CSF  - Noted to have purulent fluid from  shunt, cultures growing staph aureus.    -  shunt removed, EVD placed on 9/2/2018.    - CSF growing heavy growth S. Aureus.    - Continue to monitor EVD and ICPs  - MRI brain shows evidence of small abscess along tract with edema   - See plan in ID below     # Hydrocephalus s/p EVD  H/o arachnoid cyst s/p  shunt placement.   - EVD clamped this AM, will see how pt tolerates it    # Seizure  - First seizure post-op 7/12/2018.    - Unclear if fall on 8/30/2018 was secondary to breakthrough seizure.  - Keppra 750 BID   - Continue keppra for now, if patient has worsening mentation then consider EEG monitoring.     # Acute on Chronic headache  - Frontal headache worse this AM  - PRN tylenol and oxy     # Depression  - Holding Wellbutrin 200 at home (recommend to stop medication due to decreased seizure threshold)  - Continue prozac at home dose  - Takes Klonopin PRN for anxiety, monitor closely for withdrawal seizures.     Resp:   - Stable on room air  - Continuous pulse ox monitoring   - Maintain O2 saturations > 92%      CV:    - No issues  - SBP goal: MAP>65     Renal/FEN:   - Strict monitoring of I/O  - Not on any IVFs     Endo:   - No current concerns     Heme:   - No leukocytosis noted  - Hg > 7.0   - Plt > 100k   - INR <1.5      GI:   - No current concerns  - Regular diet     ID:   #Shunt infection/ Ventriculitis with Staph aureus in CSF  - CSF culture 9/1/2018 0300 shows heavy growth of Staph  aureus. Most recent Gram stain 9/6/2018 showed no organisms.  - Initially started on CNS dosing of vancomycin and ceftriaxone.    - Cultures growing MSSA.  Last (+) culture 9/2 (ventricular cath tip)  - Switched to nafcillin 2gm Q4 9/4/18 per ID consult  - Serial CSF cultures from EVD looking for clearance of infection  - Monitor CSF WBCs (0->124->64->78->), protein, glucose    FEN: Regular diet  PPx:- SCDs   Lines: PIV x2, EVD  Code: Full Code    Case seen and discussed with Dr. Sunita Steel MD  Vascular Neurology fellow  Pager # [phone]

## 2018-09-06 NOTE — PROGRESS NOTES
S: continued headaches. Somewhat decreased .    O:  Exam:  General: Awake;  Alert, In No Acute Distress  Pulm: Breathing Comfortably on room air  Mental status: Oriented x 3  Cranial Nerves: Cranial Nerves II-XII Intact Bilaterally with exception of left homonymous hemianopsia  Strength:      Del Tr Bi WE WF Gr  R 5 5 5 5 5 5  L 5 5 5 5 5 5     HF KE KF DF PF EHL  R 5 5 5 5 5 5  L 5 5 5 5 5 5    Pronator Drift: Absent  Sensory: Intact to Light Touch  INCISION: ventriculostomy site covered by primapore     Assessment:   #ventriculitis with associated cerebral edema due to ventriculoperitoneal shunt placement at outside hospital   #history of obstructive hydrocephalus due to cystic lesion     Plan by problem:     Neuro:   Anti-epileptics: continue prior to admission keppra  Drains: clamp  Continue prior to admission antidepressant   See ID section for antibiotic treatment of intracranial infection    Cardiovascular: no issues    Pulmonary: Incentive spirometry     Gastrointestinal: regular diet     Renal: monitor intake and output; daily BMP     Heme: daily CBC     Endocrine: continue prior to admission levothyroxine    Infectious ID consulted; appreciate recommendations. Nafcillin for MSSA. Serial CSF cultures. ESR and CRP every 72 hours.    PT/OT: pending    DVT prophylaxis: Ambulate TID. Sequential compression devices; chemoprophylaxis held due to bleeding risk     Ulcer prophylaxis: none indicated    DISPO:  pending resolution of critical illness.     Barriers: EVD, critical illness, surgical decision making      Contact the neurosurgery resident on call with questions.    Dial * * * [phone]: Enter job code [id number] when prompted.    Vikram Dunlap MD  Neurosurgery PGY3

## 2018-09-06 NOTE — PLAN OF CARE
Problem: Patient Care Overview  Goal: Plan of Care/Patient Progress Review  Outcome: Improving  Neuro - Q 4 hr neuro checks being complete at hs and q 2 hr during day. Pt A&O x4, forgetful at times to wait for RN to clamp EVD before sitting up in bed. Otherwise intact aside from left field cut.  EVD open @ 20 above.  CSF output clear pale-pink between 0-4 q hr unless sits up impulsively before clamped. Max output 25, MD notified. ICP's 0-5. Oxy given X 2. Tylenol given x1 for headaches. Afebrile    CV- Sinus rhythm to sinus james with no ectopy. SBP goal <140, hydralazine given x1. Pulses 2+, no edema.  Pulm- Room air. Lungs clear, diminished in bases.  GI - BS normoactive. Regular diet, takes pills PO without difficulty.  Last BM 9/5.   - Walks to bathroom with standby assist. Adequate UOP. Urine blood tinged d/t menses.    Skin - EVD dressing CDI. Posterior incision on head MYRNA.    Lines- DL PICC left arm. L PIV x1.  Plan- continue to monitor and notifiy MD of changes, possible clamp of EVD today per MD

## 2018-09-07 ENCOUNTER — APPOINTMENT (OUTPATIENT)
Dept: OCCUPATIONAL THERAPY | Facility: CLINIC | Age: 43
End: 2018-09-07
Attending: NEUROLOGICAL SURGERY
Payer: COMMERCIAL

## 2018-09-07 ENCOUNTER — APPOINTMENT (OUTPATIENT)
Dept: PHYSICAL THERAPY | Facility: CLINIC | Age: 43
End: 2018-09-07
Attending: NEUROLOGICAL SURGERY
Payer: COMMERCIAL

## 2018-09-07 ENCOUNTER — APPOINTMENT (OUTPATIENT)
Dept: CT IMAGING | Facility: CLINIC | Age: 43
End: 2018-09-07
Attending: STUDENT IN AN ORGANIZED HEALTH CARE EDUCATION/TRAINING PROGRAM
Payer: COMMERCIAL

## 2018-09-07 LAB
ALBUMIN SERPL-MCNC: 2.8 G/DL (ref 3.4–5)
ALP SERPL-CCNC: 90 U/L (ref 40–150)
ALT SERPL W P-5'-P-CCNC: 30 U/L (ref 0–50)
ANION GAP SERPL CALCULATED.3IONS-SCNC: 9 MMOL/L (ref 3–14)
APPEARANCE CSF: ABNORMAL
AST SERPL W P-5'-P-CCNC: 14 U/L (ref 0–45)
BACTERIA SPEC CULT: NO GROWTH
BILIRUB SERPL-MCNC: 0.6 MG/DL (ref 0.2–1.3)
BUN SERPL-MCNC: 9 MG/DL (ref 7–30)
CALCIUM SERPL-MCNC: 8.7 MG/DL (ref 8.5–10.1)
CHLORIDE SERPL-SCNC: 106 MMOL/L (ref 94–109)
CO2 SERPL-SCNC: 25 MMOL/L (ref 20–32)
COLOR CSF: COLORLESS
CREAT SERPL-MCNC: 0.79 MG/DL (ref 0.52–1.04)
EOSINOPHIL NFR CSF MANUAL: 2 %
ERYTHROCYTE [DISTWIDTH] IN BLOOD BY AUTOMATED COUNT: 12.5 % (ref 10–15)
GFR SERPL CREATININE-BSD FRML MDRD: 78 ML/MIN/1.7M2
GLUCOSE CSF-MCNC: 42 MG/DL (ref 40–70)
GLUCOSE SERPL-MCNC: 81 MG/DL (ref 70–99)
GRAM STN SPEC: NORMAL
HCT VFR BLD AUTO: 33.7 % (ref 35–47)
HGB BLD-MCNC: 11.1 G/DL (ref 11.7–15.7)
LYMPH ABN NFR CSF MANUAL: 73 %
Lab: NORMAL
MAGNESIUM SERPL-MCNC: 2.2 MG/DL (ref 1.6–2.3)
MCH RBC QN AUTO: 31.3 PG (ref 26.5–33)
MCHC RBC AUTO-ENTMCNC: 32.9 G/DL (ref 31.5–36.5)
MCV RBC AUTO: 95 FL (ref 78–100)
MONOS+MACROS NFR CSF MANUAL: 16 %
NEUTROPHILS NFR CSF MANUAL: 9 %
PHOSPHATE SERPL-MCNC: 3.8 MG/DL (ref 2.5–4.5)
PLATELET # BLD AUTO: 459 10E9/L (ref 150–450)
POTASSIUM SERPL-SCNC: 3.3 MMOL/L (ref 3.4–5.3)
PROT CSF-MCNC: 62 MG/DL (ref 15–60)
PROT SERPL-MCNC: 7.2 G/DL (ref 6.8–8.8)
RBC # BLD AUTO: 3.55 10E12/L (ref 3.8–5.2)
RBC # CSF MANUAL: 331 /UL (ref 0–2)
SODIUM SERPL-SCNC: 140 MMOL/L (ref 133–144)
SPECIMEN SOURCE: NORMAL
TUBE # CSF: 1 #
WBC # BLD AUTO: 6.2 10E9/L (ref 4–11)
WBC # CSF MANUAL: 53 /UL (ref 0–5)

## 2018-09-07 PROCEDURE — 25000132 ZZH RX MED GY IP 250 OP 250 PS 637: Performed by: STUDENT IN AN ORGANIZED HEALTH CARE EDUCATION/TRAINING PROGRAM

## 2018-09-07 PROCEDURE — 40000193 ZZH STATISTIC PT WARD VISIT: Performed by: REHABILITATION PRACTITIONER

## 2018-09-07 PROCEDURE — 97530 THERAPEUTIC ACTIVITIES: CPT | Mod: GP | Performed by: REHABILITATION PRACTITIONER

## 2018-09-07 PROCEDURE — 70450 CT HEAD/BRAIN W/O DYE: CPT

## 2018-09-07 PROCEDURE — 85027 COMPLETE CBC AUTOMATED: CPT | Performed by: INTERNAL MEDICINE

## 2018-09-07 PROCEDURE — 25000125 ZZHC RX 250: Performed by: STUDENT IN AN ORGANIZED HEALTH CARE EDUCATION/TRAINING PROGRAM

## 2018-09-07 PROCEDURE — 82945 GLUCOSE OTHER FLUID: CPT | Performed by: NURSE PRACTITIONER

## 2018-09-07 PROCEDURE — 25000128 H RX IP 250 OP 636: Performed by: STUDENT IN AN ORGANIZED HEALTH CARE EDUCATION/TRAINING PROGRAM

## 2018-09-07 PROCEDURE — 97535 SELF CARE MNGMENT TRAINING: CPT | Mod: GO | Performed by: OCCUPATIONAL THERAPIST

## 2018-09-07 PROCEDURE — 87205 SMEAR GRAM STAIN: CPT | Performed by: NURSE PRACTITIONER

## 2018-09-07 PROCEDURE — 84100 ASSAY OF PHOSPHORUS: CPT | Performed by: INTERNAL MEDICINE

## 2018-09-07 PROCEDURE — 97112 NEUROMUSCULAR REEDUCATION: CPT | Mod: GP | Performed by: REHABILITATION PRACTITIONER

## 2018-09-07 PROCEDURE — 87070 CULTURE OTHR SPECIMN AEROBIC: CPT | Performed by: NURSE PRACTITIONER

## 2018-09-07 PROCEDURE — 97750 PHYSICAL PERFORMANCE TEST: CPT | Mod: GP | Performed by: REHABILITATION PRACTITIONER

## 2018-09-07 PROCEDURE — 80053 COMPREHEN METABOLIC PANEL: CPT | Performed by: INTERNAL MEDICINE

## 2018-09-07 PROCEDURE — 97116 GAIT TRAINING THERAPY: CPT | Mod: GP | Performed by: REHABILITATION PRACTITIONER

## 2018-09-07 PROCEDURE — 83735 ASSAY OF MAGNESIUM: CPT | Performed by: INTERNAL MEDICINE

## 2018-09-07 PROCEDURE — 84157 ASSAY OF PROTEIN OTHER: CPT | Performed by: NURSE PRACTITIONER

## 2018-09-07 PROCEDURE — 89051 BODY FLUID CELL COUNT: CPT | Performed by: NURSE PRACTITIONER

## 2018-09-07 PROCEDURE — 20000004 ZZH R&B ICU UMMC

## 2018-09-07 PROCEDURE — 40000133 ZZH STATISTIC OT WARD VISIT: Performed by: OCCUPATIONAL THERAPIST

## 2018-09-07 PROCEDURE — 87075 CULTR BACTERIA EXCEPT BLOOD: CPT | Performed by: NURSE PRACTITIONER

## 2018-09-07 RX ADMIN — POTASSIUM CHLORIDE 20 MEQ: 29.8 INJECTION, SOLUTION INTRAVENOUS at 15:03

## 2018-09-07 RX ADMIN — BUPROPION HYDROCHLORIDE 200 MG: 200 TABLET, EXTENDED RELEASE ORAL at 08:04

## 2018-09-07 RX ADMIN — ACETAMINOPHEN 650 MG: 325 TABLET, FILM COATED ORAL at 08:29

## 2018-09-07 RX ADMIN — NAFCILLIN SODIUM 2 G: 2 INJECTION, POWDER, LYOPHILIZED, FOR SOLUTION INTRAMUSCULAR; INTRAVENOUS at 12:20

## 2018-09-07 RX ADMIN — NAFCILLIN SODIUM 2 G: 2 INJECTION, POWDER, LYOPHILIZED, FOR SOLUTION INTRAMUSCULAR; INTRAVENOUS at 03:55

## 2018-09-07 RX ADMIN — LEVETIRACETAM 1000 MG: 500 TABLET ORAL at 08:15

## 2018-09-07 RX ADMIN — FLUOXETINE 60 MG: 20 CAPSULE ORAL at 08:05

## 2018-09-07 RX ADMIN — SENNOSIDES AND DOCUSATE SODIUM 2 TABLET: 8.6; 5 TABLET ORAL at 08:14

## 2018-09-07 RX ADMIN — LEVOTHYROXINE SODIUM 75 MCG: 75 TABLET ORAL at 08:02

## 2018-09-07 RX ADMIN — RIFAMPIN 300 MG: 300 CAPSULE ORAL at 12:17

## 2018-09-07 RX ADMIN — NAFCILLIN SODIUM 2 G: 2 INJECTION, POWDER, LYOPHILIZED, FOR SOLUTION INTRAMUSCULAR; INTRAVENOUS at 08:16

## 2018-09-07 RX ADMIN — LEVETIRACETAM 1000 MG: 500 TABLET ORAL at 19:55

## 2018-09-07 RX ADMIN — ACETAMINOPHEN 650 MG: 325 TABLET, FILM COATED ORAL at 19:55

## 2018-09-07 RX ADMIN — NAFCILLIN SODIUM 2 G: 2 INJECTION, POWDER, LYOPHILIZED, FOR SOLUTION INTRAMUSCULAR; INTRAVENOUS at 16:36

## 2018-09-07 RX ADMIN — ACETAMINOPHEN 650 MG: 325 TABLET, FILM COATED ORAL at 02:33

## 2018-09-07 RX ADMIN — OXYCODONE HYDROCHLORIDE 5 MG: 5 TABLET ORAL at 04:08

## 2018-09-07 RX ADMIN — ACETAMINOPHEN 650 MG: 325 TABLET, FILM COATED ORAL at 12:27

## 2018-09-07 RX ADMIN — RIFAMPIN 300 MG: 300 CAPSULE ORAL at 19:55

## 2018-09-07 RX ADMIN — Medication 250 MG: at 19:55

## 2018-09-07 RX ADMIN — POLYETHYLENE GLYCOL 3350 17 G: 17 POWDER, FOR SOLUTION ORAL at 08:06

## 2018-09-07 RX ADMIN — NAFCILLIN SODIUM 2 G: 2 INJECTION, POWDER, LYOPHILIZED, FOR SOLUTION INTRAMUSCULAR; INTRAVENOUS at 00:05

## 2018-09-07 RX ADMIN — ACETAMINOPHEN 650 MG: 325 TABLET, FILM COATED ORAL at 16:36

## 2018-09-07 RX ADMIN — Medication 250 MG: at 08:14

## 2018-09-07 RX ADMIN — NAFCILLIN SODIUM 2 G: 2 INJECTION, POWDER, LYOPHILIZED, FOR SOLUTION INTRAMUSCULAR; INTRAVENOUS at 19:58

## 2018-09-07 RX ADMIN — POTASSIUM CHLORIDE 20 MEQ: 29.8 INJECTION, SOLUTION INTRAVENOUS at 05:06

## 2018-09-07 ASSESSMENT — ACTIVITIES OF DAILY LIVING (ADL)
ADLS_ACUITY_SCORE: 9
ADLS_ACUITY_SCORE: 9
ADLS_ACUITY_SCORE: 10
ADLS_ACUITY_SCORE: 9

## 2018-09-07 ASSESSMENT — VISUAL ACUITY: OU: NORMAL ACUITY

## 2018-09-07 ASSESSMENT — PAIN DESCRIPTION - DESCRIPTORS
DESCRIPTORS: HEADACHE

## 2018-09-07 NOTE — PROGRESS NOTES
09/07/18 1111   Signing Clinician's Name / Credentials   Signing clinician's name / credentials Delmi Hardwick, DPT   Functional Gait Assessment (RIGO Coyle, YECENIA Matthews, et al. (2004))   1. GAIT LEVEL SURFACE 3   2. CHANGE IN GAIT SPEED 3   3. GAIT WITH HORIZONTAL HEAD TURNS 3   4. GAIT WITH VERTICAL HEAD TURNS 3   5. GAIT AND PIVOT TURN 3   6. STEP OVER OBSTACLE 3   7. GAIT WITH NARROW BASE OF SUPPORT 1   8. GAIT WITH EYES CLOSED 3   9. AMBULATING BACKWARDS 3   10. STEPS 2   Total Functional Gait Assessment Score   TOTAL SCORE: (MAXIMUM SCORE 30) 27   Functional Gait Assessment (FGA): The FGA assesses postural stability during various walking tasks.   Gait assistive device used: none    Patient Score: 27/30  Scores of <22/30 have been correlated with predicting falls in community-dwelling older adults according to Jonna & Micky 2010.   Scores of <18/30 have been correlated with increased risk for falls in patients with Parkinsons Disease according to Jens Segovia, Lopez et al 2014.  Minimal Detectable Change for patients with acute/chronic stroke = 4.2 according to Thijasen & Pamelaschel 2009  Minimal Detectable Change for patients with vestibular disorder = 8 according to Jonna & Micky 2010    Assessment (rationale for performing, application to patient s function & care plan): Pt ambulating at good speed, wondering if needs ambulatory assistive device or further PT at disch.  Pt scoring low fall risk with FGA, does need prompts to use strategies for visual field cut and for line safety.  Does not need cane/walker for disch.  Declines outpatient PT, preferring to work on HEP on her own, but agrees to request OP PT if does not progress as indicated.  Minutes billed as physical performance test: 23

## 2018-09-07 NOTE — PLAN OF CARE
Problem: Pain, Acute (Adult)  Goal: Identify Related Risk Factors and Signs and Symptoms  Related risk factors and signs and symptoms are identified upon initiation of Human Response Clinical Practice Guideline (CPG).   Outcome: Improving  D/I: Pt alert and oriented, neuro intact except left field cut. Pt can be impulsive when she has to void. She tried to go to the bathroom by herself. Bed alarm on to keep pt safe. EVD clamped, ICP 2-7.  VSS, afebrile (see VS flow sheets). Voiding good amounts of urine (see I&O flow sheets). Tylenol for head ache. Sats 99% on room air. Tolerating regular diet. BM x2.

## 2018-09-07 NOTE — PLAN OF CARE
Problem: Patient Care Overview  Goal: Plan of Care/Patient Progress Review  Outcome: Improving  Neuro - Q 4 hr neuro checks being complete at hs and q 2 hr during day. Pt A&O x4, intact aside from left field cut.  ADRIAN with 5/5 strength. EVD @ 20 above EAM, clamped all day. ICP's 3-8. PRN Tylenol given x2 and PRN oxycodone given x1 for headaches. Afebrile.    CV- Sinus rhythm to sinus james with occasional PVC's. SBP goal <140, no interventions needed. Pulses 2+, no edema.  Pulm- Room air. Lungs clear. O2 sat's @ 95% and above.  GI - BS normoactive. Regular diet, takes pills PO without difficulty.  Last BM 9/6.   - Walks to bathroom with standby assist d/t lines and EVD. Adequate UOP. Urine blood tinged d/t menses, orange tinged urine d/t medication side effect.    Skin - EVD dressing CDI. Posterior incision on head clean, MYRNA and without drainage.    Lines- DL PICC left arm. L PIV x1.  K+ replaced this AM for 3.3. Recheck 2hrs after infusion complete.  Plan- Continue to monitor and notifiy MD of changes. CT complete this AM since EVD clamped. Possible removal of EVD.

## 2018-09-07 NOTE — PROGRESS NOTES
Neurosurgery Progress Note    S: continued headaches. Somewhat decreased .    O:  Exam:  General: Awake;  Alert, In No Acute Distress  Pulm: Breathing Comfortably on room air  Mental status: Oriented x 3  Cranial Nerves: Cranial Nerves II-XII Intact Bilaterally with exception of left homonymous hemianopsia  Strength:      Del Tr Bi WE WF Gr  R 5 5 5 5 5 5  L 5 5 5 5 5 5     HF KE KF DF PF EHL  R 5 5 5 5 5 5  L 5 5 5 5 5 5    Pronator Drift: Absent  Sensory: Intact to Light Touch  INCISION: ventriculostomy site covered by primapore     Assessment:   #ventriculitis with associated cerebral edema due to ventriculoperitoneal shunt placement at outside hospital   #history of obstructive hydrocephalus due to cystic lesion     Plan by problem:     Neuro:   Anti-epileptics: continue prior to admission keppra  EVD: clamped  Continue prior to admission antidepressant   See ID section for antibiotic treatment of intracranial infection    Cardiovascular: no issues    Pulmonary: Incentive spirometry     Gastrointestinal: regular diet     Renal: monitor intake and output; daily BMP     Heme: daily CBC     Endocrine: continue prior to admission levothyroxine    Infectious ID consulted; appreciate recommendations. Nafcillin and rifampin for MSSA. Serial CSF cultures. ESR and CRP every 72 hours. ID recommending 10 negative CSF cultures    PT/OT: pending    DVT prophylaxis: Ambulate TID. Sequential compression devices; chemoprophylaxis held due to bleeding risk     Ulcer prophylaxis: none indicated    DISPO:  pending resolution of critical illness.     Barriers: EVD, critical illness, surgical decision making    -----------------------------------  Luz Mary MD, MS  Neurosurgery PGY-2

## 2018-09-07 NOTE — PLAN OF CARE
Problem: Patient Care Overview  Goal: Plan of Care/Patient Progress Review  Discharge Planner OT   Patient plan for discharge: Pt would like to return home.   Current status: Pt supine inclined in bed upon arrival. Educated pt on craniotomy precautions. Pt required CGA/Min A and vc's for transfer supine <->seated EOB and sit<->standing pivot transfer to bedside chair. Pt engaged in few BUE AROM exercises. Pt limited by nausea.   Barriers to return to prior living situation: Decreased activity tolerance, Craniotomy precautions.   Recommendations for discharge: Anticipate pt will be able to discharge home with A and OP therapy pending continued progress.  Rationale for recommendations: Pt will benefit from continued therapy to address barriers above and to maximize functional independence.        Entered by: Jed Bates 09/06/2018 11:54 PM

## 2018-09-07 NOTE — PROGRESS NOTES
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    Problem List  1. Ventriculitis with cerebral edema and right posterior occipital tract abscess   2. Staph aureus in CSF  3. Status post VPS shunt removal and EVD placement  4. History of 3rd ventricular arachnoid cyst s/p VPS 7/2018  5. Headaches  6. New onset seizures     Medications:  Current Facility-Administered Medications   Medication     acetaminophen (TYLENOL) tablet 650 mg     buPROPion (WELLBUTRIN SR) 12 hr tablet 200 mg     FLUoxetine (PROzac) capsule 60 mg     heparin lock flush 10 UNIT/ML injection 2-5 mL     heparin lock flush 10 UNIT/ML injection 5-10 mL     heparin lock flush 10 UNIT/ML injection 5-10 mL     hydrALAZINE (APRESOLINE) injection 10-20 mg     labetalol (NORMODYNE/TRANDATE) injection 10-40 mg     levETIRAcetam (KEPPRA) tablet 1,000 mg     levothyroxine (SYNTHROID/LEVOTHROID) tablet 75 mcg     lidocaine (LMX4) cream     lidocaine (LMX4) cream     lidocaine (LMX4) cream     lidocaine 1 % 1 mL     lidocaine 1 % 1 mL     magnesium sulfate 2 g in NS intermittent infusion ([facility] or [facility] Cmpd)     magnesium sulfate 4 g in 100 mL sterile water (premade)     metoclopramide (REGLAN) tablet 10 mg    Or     metoclopramide (REGLAN) injection 10 mg     nafcillin IV 2 g vial to attach to  ml bag     naloxone (NARCAN) injection 0.1-0.4 mg     ondansetron (ZOFRAN-ODT) ODT tab 4-8 mg    Or     ondansetron (ZOFRAN) injection 4-8 mg     oxyCODONE IR (ROXICODONE) tablet 5 mg     polyethylene glycol (MIRALAX/GLYCOLAX) Packet 17 g     potassium chloride (KLOR-CON) Packet 20-40 mEq     potassium chloride 10 mEq in 100 mL sterile water intermittent infusion (premix)     potassium  "chloride 20 mEq in 50 mL intermittent infusion     potassium chloride SA (K-DUR/KLOR-CON M) CR tablet 20-40 mEq     potassium phosphate 10 mmol in D5W 250 mL intermittent infusion     potassium phosphate 15 mmol in D5W 250 mL intermittent infusion     potassium phosphate 20 mmol in D5W 250 mL intermittent infusion     potassium phosphate 20 mmol in D5W 500 mL intermittent infusion     potassium phosphate 25 mmol in D5W 500 mL intermittent infusion     prochlorperazine (COMPAZINE) injection 10 mg    Or     prochlorperazine (COMPAZINE) tablet 10 mg     rifampin (RIFADIN) capsule 300 mg     saccharomyces boulardii (FLORASTOR) capsule 250 mg     senna-docusate (SENOKOT-S;PERICOLACE) 8.6-50 MG per tablet 2 tablet     sodium chloride (PF) 0.9% PF flush 10-20 mL     sodium chloride (PF) 0.9% PF flush 3 mL     sodium chloride (PF) 0.9% PF flush 3 mL     sodium chloride (PF) 0.9% PF flush 5-50 mL     24 hour events:  Awake and alert    24 Hour Vital Signs Summary:  /75  Pulse 62  Temp 98.3  F (36.8  C) (Axillary)  Resp 10  Ht 1.803 m (5' 11\")  Wt 85.1 kg (187 lb 9.8 oz)  SpO2 99%  BMI 26.17 kg/m2  Resp: 10   Intake/Output Summary (Last 24 hours) at 09/07/18 0917  Last data filed at 09/07/18 0900   Gross per 24 hour   Intake             2110 ml   Output             2200 ml   Net              -90 ml   EVD clamped yesterday 9/5/2018    Physical Examination:  General: Tired, lying in bed  HEENT: Eyes nonicteric, dressings in place on scalp; ROM intact  Resp: CTAB  Extremities: no edema  Neurological Examination  Mentation - alert, attentive, oriented to person, place and time, follows commands, speech intact and clear.  Cranial nerves - Left homonymous hemianopia, seems improved in all quadrants but still present, EOMI.  Facial expressions symmetric.  Hearing intact to conversation. No dysarthria.  Palate elevation symmetric.  Strong shoulder shrug and head turn bilaterally.  Tongue movements intact.  Motor exam - " Strength 5/5 throughout.  No abnormal movements noted.  Reflexes - 2+ and symmetric.  Sensation - intact to light touch in all four extremities.  Gait - not assessed  Pronator Drift - absent  Finger to nose - no dysmetria    Labs/Studies:  Recent Labs  Lab 09/07/18 0348 09/06/18 0413 09/05/18 0431 09/04/18  0849 09/04/18  0340    138 138  --  135   POTASSIUM 3.3* 3.6 3.8  --  4.2   CHLORIDE 106 104 104  --  104   CO2 25 27 29  --  23   ANIONGAP 9 8 6  --  8   GLC 81 84 77  --  76   BUN 9 8 9  --  7   CR 0.79 0.64 0.74  --  0.71   GFRESTIMATED 78 >90 85  --  89   GFRESTBLACK >90 >90 >90  --  >90   EDUIN 8.7 9.1 8.8  --  8.7   MAG 2.2 2.2 2.2 2.4*  --    PHOS 3.8 3.8 3.2  --  3.0   PROTTOTAL 7.2 7.2  --   --   --    ALBUMIN 2.8* 2.8*  --   --   --    BILITOTAL 0.6 0.3  --   --   --    ALKPHOS 90 92  --   --   --    AST 14 18  --   --   --    ALT 30 31  --   --   --        Recent Labs  Lab 09/07/18 0348 09/06/18 0413 09/05/18  0431 09/04/18  0340   WBC 6.2 6.0 5.9 6.4   RBC 3.55* 3.54* 3.41* 3.43*   HGB 11.1* 10.9* 10.6* 10.6*   HCT 33.7* 33.7* 32.5* 33.3*   MCV 95 95 95 97   MCH 31.3 30.8 31.1 30.9   MCHC 32.9 32.3 32.6 31.8   RDW 12.5 12.4 12.3 12.3   * 449 427 368   CSF 9/7/2018 0305:  WBC 53  Glucose 42  Protein Total 62  CSF 9/6/2018 0413:  WBC 63  Glucose 44  Protein Total 66  CSF 9/5/2018 0431:  WBC 78  Glucose 45  Protein Total 66  No organisms seen on Gram stain, 9/7/2018, 9/6/2018, 9/5/2018, 9/4/2018, 9/3/2018    Imaging:  Mr Brain W/o & W Contrast -   1. Small rim-enhancing fluid collections in the posterior right cerebral hemisphere along the ventriculostomy catheter tract, most likely small abscess along a catheter tract.   2. Confluent abnormal T2 signal surrounding these collections is favored edema.     Assessment/Plan  42 y/o right-handed female w/ PMH of vellum interpositum intracranial arachnoid cyst s/p EVD placement 7/12/2018 c/b seizure and  shunt placement 7/18/2018, unspecified  mood disorder, hypothyroidism who was transferred here 9/1/2018 from [place] after a fall at home associated with persisting headache and purulent drainage at occipital  shunt surgical site.     Neuro:  # Shunt infection/Ventriculitis with tract abscess   # S. aureus in CSF  - Noted to have purulent fluid from  shunt, cultures growing staph aureus.  -  shunt removed, EVD placed on 9/2/2018, clamped on 9/6/2018.  - CSF growing heavy growth S. Aureus. Cultures have been negative since 9/3/2018  - Continue to monitor EVD and ICPs  - MRI brain shows evidence of small abscess along tract with edema   - See plan in ID below     # Hydrocephalus s/p EVD  H/o arachnoid cyst s/p  shunt placement.   - EVD clamped this AM, will see how pt tolerates it    # Seizure  - First seizure post-op 7/12/2018.    - Unclear if fall on 8/30/2018 was secondary to breakthrough seizure.  - Keppra 750 BID   - Continue keppra for now, if patient has worsening mentation then consider EEG monitoring.     # Acute on Chronic headache  - Frontal headache worse this AM  - PRN tylenol and oxy     # Depression  - Holding Wellbutrin 200 at home (recommend to stop medication due to decreased seizure threshold)  - Continue prozac at home dose  - Takes Klonopin PRN for anxiety, monitor closely for withdrawal seizures.     Resp:   - Stable on room air  - Continuous pulse ox monitoring   - Maintain O2 saturations > 92%      CV:    - No issues  - SBP goal: MAP>65     Renal/FEN:   - Strict monitoring of I/O  - Not on any IVFs     Endo:   - No current concerns     Heme:   - No leukocytosis noted  - Hg > 7.0   - Plt > 100k   - INR <1.5      GI:   - No current concerns  - Regular diet     ID:   #Shunt infection/ Ventriculitis with Staph aureus in CSF  - CSF culture 9/1/2018 2330 shows heavy growth of Staph aureus. Most recent Gram stains 9/6/2018 and 9/7/2018 showed no organisms.  - Initially started on CNS dosing of vancomycin and ceftriaxone.    -  Cultures growing MSSA.  Last (+) culture 9/2 (ventricular cath tip)  - Switched to nafcillin 2gm Q4 9/4/18 per ID consult  - Added rifampin 9/6  - Serial CSF cultures looking for clearance of infection  - Monitor CSF WBCs (0->124->64->78->), protein, glucose    FEN: Regular diet  PPx:- SCDs   Lines: PIV x2, EVD  Code: Full Code    Case seen and discussed with Dr. Sunita Steel MD  Vascular Neurology fellow  Pager # [phone]

## 2018-09-07 NOTE — PLAN OF CARE
Problem: Patient Care Overview  Goal: Plan of Care/Patient Progress Review  OT 4AB: Discharge Planner OT   Patient plan for discharge: home with assist  Current status: Pt progressing activity tolerance and mobility well.  L visual field cut continues to be primary barrier to safety and independence with mobilizing in novel environments, reading, return to driving, fall prevention strategies, etc.  Engaged pt in teaching and implementation of several compensatory strategies. Pt very engaged but benefits from continued cues and education  Barriers to return to prior living situation: acute medical needs, visual impairment  Recommendations for discharge: Home with assist and OP OT/Vision Therapy - recommend return to driving assessment prior to pt re-engaging in this task  Rationale for recommendations: Pt is mobilizing well and able to complete basic self cares; however, some safety concerns and barriers remain with regards to acute visual changes.       Entered by: Rosanne Martinez 09/07/2018 2:27 PM

## 2018-09-07 NOTE — PROGRESS NOTES
Care Coordinator Progress Note    Admission Date/Time:  9/1/2018  Attending MD:  Antonio Salinas MD    Data  Chart reviewed, discussed with interdisciplinary team.   Patient was admitted for: Infection of ventricular shunt, initial encounter (H).    Concerns with insurance coverage for discharge needs: None.  Current Living Situation: Patient lives with so  Support System: mom and so-  Supportive and Involved  Services Involved:  None  Transportation at Discharge: Family or friend will provide  Transportation to Medical Appointments:  Not known at this time.  Barriers to Discharge: Pt is not medically ready for discharge.    Assessment  Pt is s/p removal of  shunt and placement of EVD on 9/2/18.  Pt remain in ICU with EVD and frequent neuro check.    Visited pt to introduce RNCC role and for support.  RNCC role discussed and contact info provided.  Pt stated the team have been updating her well about the plan of care.  During my visit neurosurgery team came and provided update to her.  The team stated pt might be here for another one more week.  They informed her they are planning to see if she will be able to tolerate EVD clamping process to see if she can be discharge without  shunt.  Pt agreed.  Pt stated she was ind. with all her cares and mobility prior hospitalization and declined for any discharge assistance need at this time.  Pt stated she lives with her boyfriend and she can stay with him or with her mom after she is discharged from the hospital.    Plan  Anticipated Discharge Date:  TBD.  Anticipated Discharge Plan:   Home with assist.  RNCC will cont to follow plan of care.      Norma Harris RN, PHN, BSN  4A and 4E/ ICU  Care Coordinator  Phone: [phone]  Pager: [phone]

## 2018-09-07 NOTE — PLAN OF CARE
Problem: Patient Care Overview  Goal: Plan of Care/Patient Progress Review  PT -   Discharge Planner PT   Patient plan for discharge: her home with her boyfriend or her mother's home  Current status: Pt met goals, but would like 1 more session to ensure indep with standing balance HEP prior to disch as she would prefer HEP instead of HEP.  Pt's balance issues primarily due to visual field cut - please strongly encourage pt to apply the strategies she has been taught  Barriers to return to prior living situation: visual field cut, decreased line safety awareness  Recommendations for discharge: home with A, Home ex program, pt agrees to request outpatient PT if balance not progressing as expected  Rationale for recommendations: see above.  Entered by: Delmi Hardwick 09/07/2018 11:44 AM

## 2018-09-08 LAB
ABO + RH BLD: NORMAL
ABO + RH BLD: NORMAL
ALBUMIN SERPL-MCNC: 3 G/DL (ref 3.4–5)
ALP SERPL-CCNC: 93 U/L (ref 40–150)
ALT SERPL W P-5'-P-CCNC: 31 U/L (ref 0–50)
ANION GAP SERPL CALCULATED.3IONS-SCNC: 8 MMOL/L (ref 3–14)
APPEARANCE CSF: ABNORMAL
AST SERPL W P-5'-P-CCNC: 16 U/L (ref 0–45)
BACTERIA SPEC CULT: NO GROWTH
BASOPHILS NFR CSF MANUAL: 1 %
BILIRUB SERPL-MCNC: 0.5 MG/DL (ref 0.2–1.3)
BLD GP AB SCN SERPL QL: NORMAL
BLOOD BANK CMNT PATIENT-IMP: NORMAL
BUN SERPL-MCNC: 9 MG/DL (ref 7–30)
CALCIUM SERPL-MCNC: 8.7 MG/DL (ref 8.5–10.1)
CHLORIDE SERPL-SCNC: 107 MMOL/L (ref 94–109)
CO2 SERPL-SCNC: 24 MMOL/L (ref 20–32)
COLOR CSF: ABNORMAL
CREAT SERPL-MCNC: 0.68 MG/DL (ref 0.52–1.04)
EOSINOPHIL NFR CSF MANUAL: 2 %
ERYTHROCYTE [DISTWIDTH] IN BLOOD BY AUTOMATED COUNT: 12.7 % (ref 10–15)
GFR SERPL CREATININE-BSD FRML MDRD: >90 ML/MIN/1.7M2
GLUCOSE CSF-MCNC: 46 MG/DL (ref 40–70)
GLUCOSE SERPL-MCNC: 83 MG/DL (ref 70–99)
GRAM STN SPEC: NORMAL
GRAM STN SPEC: NORMAL
HCT VFR BLD AUTO: 34.6 % (ref 35–47)
HGB BLD-MCNC: 11.3 G/DL (ref 11.7–15.7)
LYMPH ABN NFR CSF MANUAL: 58 %
MAGNESIUM SERPL-MCNC: 2.3 MG/DL (ref 1.6–2.3)
MCH RBC QN AUTO: 30.8 PG (ref 26.5–33)
MCHC RBC AUTO-ENTMCNC: 32.7 G/DL (ref 31.5–36.5)
MCV RBC AUTO: 94 FL (ref 78–100)
MONOS+MACROS NFR CSF MANUAL: 39 %
PHOSPHATE SERPL-MCNC: 3.7 MG/DL (ref 2.5–4.5)
PLATELET # BLD AUTO: 462 10E9/L (ref 150–450)
POTASSIUM SERPL-SCNC: 3.9 MMOL/L (ref 3.4–5.3)
PROT CSF-MCNC: 52 MG/DL (ref 15–60)
PROT SERPL-MCNC: 7.5 G/DL (ref 6.8–8.8)
RBC # BLD AUTO: 3.67 10E12/L (ref 3.8–5.2)
RBC # CSF MANUAL: 365 /UL (ref 0–2)
SODIUM SERPL-SCNC: 140 MMOL/L (ref 133–144)
SPECIMEN EXP DATE BLD: NORMAL
SPECIMEN SOURCE: NORMAL
SPECIMEN SOURCE: NORMAL
TUBE # CSF: ABNORMAL #
WBC # BLD AUTO: 6.6 10E9/L (ref 4–11)
WBC # CSF MANUAL: 33 /UL (ref 0–5)

## 2018-09-08 PROCEDURE — 86900 BLOOD TYPING SEROLOGIC ABO: CPT | Performed by: STUDENT IN AN ORGANIZED HEALTH CARE EDUCATION/TRAINING PROGRAM

## 2018-09-08 PROCEDURE — 87070 CULTURE OTHR SPECIMN AEROBIC: CPT | Performed by: STUDENT IN AN ORGANIZED HEALTH CARE EDUCATION/TRAINING PROGRAM

## 2018-09-08 PROCEDURE — 86850 RBC ANTIBODY SCREEN: CPT | Performed by: STUDENT IN AN ORGANIZED HEALTH CARE EDUCATION/TRAINING PROGRAM

## 2018-09-08 PROCEDURE — 25000132 ZZH RX MED GY IP 250 OP 250 PS 637: Performed by: STUDENT IN AN ORGANIZED HEALTH CARE EDUCATION/TRAINING PROGRAM

## 2018-09-08 PROCEDURE — 87075 CULTR BACTERIA EXCEPT BLOOD: CPT | Performed by: STUDENT IN AN ORGANIZED HEALTH CARE EDUCATION/TRAINING PROGRAM

## 2018-09-08 PROCEDURE — 25000125 ZZHC RX 250: Performed by: STUDENT IN AN ORGANIZED HEALTH CARE EDUCATION/TRAINING PROGRAM

## 2018-09-08 PROCEDURE — 25000128 H RX IP 250 OP 636

## 2018-09-08 PROCEDURE — 80053 COMPREHEN METABOLIC PANEL: CPT | Performed by: INTERNAL MEDICINE

## 2018-09-08 PROCEDURE — 89051 BODY FLUID CELL COUNT: CPT | Performed by: STUDENT IN AN ORGANIZED HEALTH CARE EDUCATION/TRAINING PROGRAM

## 2018-09-08 PROCEDURE — 87205 SMEAR GRAM STAIN: CPT | Performed by: STUDENT IN AN ORGANIZED HEALTH CARE EDUCATION/TRAINING PROGRAM

## 2018-09-08 PROCEDURE — 82945 GLUCOSE OTHER FLUID: CPT | Performed by: STUDENT IN AN ORGANIZED HEALTH CARE EDUCATION/TRAINING PROGRAM

## 2018-09-08 PROCEDURE — 84100 ASSAY OF PHOSPHORUS: CPT | Performed by: INTERNAL MEDICINE

## 2018-09-08 PROCEDURE — 86901 BLOOD TYPING SEROLOGIC RH(D): CPT | Performed by: STUDENT IN AN ORGANIZED HEALTH CARE EDUCATION/TRAINING PROGRAM

## 2018-09-08 PROCEDURE — 25000128 H RX IP 250 OP 636: Performed by: STUDENT IN AN ORGANIZED HEALTH CARE EDUCATION/TRAINING PROGRAM

## 2018-09-08 PROCEDURE — 20000004 ZZH R&B ICU UMMC

## 2018-09-08 PROCEDURE — 84157 ASSAY OF PROTEIN OTHER: CPT | Performed by: STUDENT IN AN ORGANIZED HEALTH CARE EDUCATION/TRAINING PROGRAM

## 2018-09-08 PROCEDURE — 85027 COMPLETE CBC AUTOMATED: CPT | Performed by: INTERNAL MEDICINE

## 2018-09-08 PROCEDURE — 83735 ASSAY OF MAGNESIUM: CPT | Performed by: INTERNAL MEDICINE

## 2018-09-08 RX ORDER — SODIUM CHLORIDE 9 MG/ML
INJECTION, SOLUTION INTRAVENOUS
Status: COMPLETED
Start: 2018-09-08 | End: 2018-09-08

## 2018-09-08 RX ADMIN — Medication 2 G: at 06:05

## 2018-09-08 RX ADMIN — ACETAMINOPHEN 650 MG: 325 TABLET, FILM COATED ORAL at 03:27

## 2018-09-08 RX ADMIN — OXYCODONE HYDROCHLORIDE 5 MG: 5 TABLET ORAL at 10:42

## 2018-09-08 RX ADMIN — SODIUM CHLORIDE 500 ML: 9 INJECTION, SOLUTION INTRAVENOUS at 08:16

## 2018-09-08 RX ADMIN — RIFAMPIN 300 MG: 300 CAPSULE ORAL at 20:55

## 2018-09-08 RX ADMIN — ACETAMINOPHEN 650 MG: 325 TABLET, FILM COATED ORAL at 16:07

## 2018-09-08 RX ADMIN — OXYCODONE HYDROCHLORIDE 5 MG: 5 TABLET ORAL at 00:23

## 2018-09-08 RX ADMIN — POTASSIUM CHLORIDE 20 MEQ: 29.8 INJECTION, SOLUTION INTRAVENOUS at 05:00

## 2018-09-08 RX ADMIN — BUPROPION HYDROCHLORIDE 200 MG: 200 TABLET, EXTENDED RELEASE ORAL at 07:57

## 2018-09-08 RX ADMIN — Medication 250 MG: at 07:57

## 2018-09-08 RX ADMIN — NAFCILLIN SODIUM 2 G: 2 INJECTION, POWDER, LYOPHILIZED, FOR SOLUTION INTRAMUSCULAR; INTRAVENOUS at 20:17

## 2018-09-08 RX ADMIN — RIFAMPIN 300 MG: 300 CAPSULE ORAL at 12:11

## 2018-09-08 RX ADMIN — LEVOTHYROXINE SODIUM 75 MCG: 75 TABLET ORAL at 07:57

## 2018-09-08 RX ADMIN — NAFCILLIN SODIUM 2 G: 2 INJECTION, POWDER, LYOPHILIZED, FOR SOLUTION INTRAMUSCULAR; INTRAVENOUS at 16:10

## 2018-09-08 RX ADMIN — NAFCILLIN SODIUM 2 G: 2 INJECTION, POWDER, LYOPHILIZED, FOR SOLUTION INTRAMUSCULAR; INTRAVENOUS at 00:23

## 2018-09-08 RX ADMIN — NAFCILLIN SODIUM 2 G: 2 INJECTION, POWDER, LYOPHILIZED, FOR SOLUTION INTRAMUSCULAR; INTRAVENOUS at 03:27

## 2018-09-08 RX ADMIN — LEVETIRACETAM 1000 MG: 500 TABLET ORAL at 07:57

## 2018-09-08 RX ADMIN — Medication 250 MG: at 20:14

## 2018-09-08 RX ADMIN — ACETAMINOPHEN 650 MG: 325 TABLET, FILM COATED ORAL at 07:57

## 2018-09-08 RX ADMIN — OXYCODONE HYDROCHLORIDE 5 MG: 5 TABLET ORAL at 22:47

## 2018-09-08 RX ADMIN — NAFCILLIN SODIUM 2 G: 2 INJECTION, POWDER, LYOPHILIZED, FOR SOLUTION INTRAMUSCULAR; INTRAVENOUS at 12:13

## 2018-09-08 RX ADMIN — LEVETIRACETAM 1000 MG: 500 TABLET ORAL at 20:14

## 2018-09-08 RX ADMIN — FLUOXETINE 60 MG: 20 CAPSULE ORAL at 07:57

## 2018-09-08 RX ADMIN — NAFCILLIN SODIUM 2 G: 2 INJECTION, POWDER, LYOPHILIZED, FOR SOLUTION INTRAMUSCULAR; INTRAVENOUS at 08:18

## 2018-09-08 ASSESSMENT — ACTIVITIES OF DAILY LIVING (ADL)
ADLS_ACUITY_SCORE: 9

## 2018-09-08 ASSESSMENT — VISUAL ACUITY
OU: NORMAL ACUITY

## 2018-09-08 ASSESSMENT — PAIN DESCRIPTION - DESCRIPTORS
DESCRIPTORS: HEADACHE

## 2018-09-08 NOTE — PLAN OF CARE
Problem: Pain, Acute (Adult)  Goal: Identify Related Risk Factors and Signs and Symptoms  Related risk factors and signs and symptoms are identified upon initiation of Human Response Clinical Practice Guideline (CPG).   Outcome: No Change  D/I: Pt Neuro status unchanged (see flow sheets). EVD clamped, ICP 1-9. VSS, afebrile. Sats 100% on RA. Several loose stools. Laxatives held per pt request. Voiding spontaneously. Tylenol and oxycodone for pain. Spinal fluid sent for cultures.   A: Pt condition unchanged. Neuro intact, vitals stable.   P: Continue with IV antibiotics. Monitor ICP. Update MD with concerns.

## 2018-09-08 NOTE — PLAN OF CARE
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        Neuro: intact except for L vision field cut. Patient impulsive at times, bed alarm on when awake. EVD clamped at 20 above EAM with ICPs 5-12  CV: NSR with no ectopy noted. Pressures stable 130s/80s. H/A overnight.   Resp: RA, clear with good cough  GI: Using bathroom, having loose stools frequently. Patient did want bowel regimen held overnight. Regular diet tolerated well.  : adequate uop  Skin:  Posterior head incision with staples tender to touch. EVD with dressing covering insertion sites. Patient has abrasions on legs and knees noted.   LAD's: L DL PICC, L PIV all SL  Muc: independent with SBA only. Tylenol for pain and x1 dose of oxycodone overnight.  Gtts: abx  Plan:  OR middle of next week to replace  shunt possibly. Replace electrolytes as needed from labs.

## 2018-09-08 NOTE — PROGRESS NOTES
[facility] INFECTIOUS DISEASES : PROGRESS NOTE  Bandar Olmedo : 1975 Sex: female:   Medical record number [id number] Attending Physician: Antonio Salinas MD  Date of Service: 2018    ASSESSMENT :  43 year old year old female with ADHD, OA, depression, anxiety and  vellum interpositum intracranial arachnoid cyst s/p ventriculoperiotoneal shunt 2018 admitted on 2018 with severe headaches, unresponsiveness and noted to have ventriculitis and edema due to  shunt placed at OSH.   1. Arachnoid cyst detected 2018 s/p EVD and ultimately VPS on 18 at [facility] complicated by ventriculitis -  Staph aureus MSSA + small abscesses seen on MRI 18  - S/p removal VPS and placement EVD 18.  - CT brain wo contrast  comparison 9/3/18   1. Stable positioning of right frontal approach ventriculostomy catheter, without significant change in size of the ventricles since prior exam.  2. Continued vasogenic edema involving predominantly the white matter of the right occipital parietal distribution, not significantly changed from prior exam.  3. No evidence of intracranial hemorrhage.    CT brain wo contrast 9/3/18  Impression:    1. Rim-enhancing fluid collection seen on MRI are not demonstrated on this CT examination there is continued vasogenic edema involving predominantly the right matter of the right occipital parietal  distribution, not significantly changed from 2018. Continued mass effect and effacement of the right occipital horn.  2. Stable positioning of right frontal approach ventriculostomy catheter, without significant change in size of the ventricles since prior exam.  3. No evidence for intracranial hemorrhage.     - MRI brain w and wo contrast 18   Impression:  1. Small rim-enhancing fluid collections in the posterior right cerebral hemisphere along the ventriculostomy catheter tract, most likely small abscess along a catheter tract.  2.  "Confluent abnormal T2 signal surrounding these collections is favored edema.    2.  Depression/anxiety, pre-existing. Unclear extent to this HPI exacerbates these symptoms. Notably was recent evaluated for commitment after expressing SI following revocation of her license  3. ADHD  4. PICC placed on 9/4/18     RECOMMENDATIONS:  -  continue Nafcillin 2g V q4hr   and Rifampin 300 mg PO q12hr   -  serial CSF cultures from her EVD looking specifically for clearance of infection, may replace shunt if CSF cultures remain neg  after  10 days   - monitor LFTs  - probiotic daily   - patient reminded not to touch the surgical site  - ventriculitis with small abscesses seen on MRI 9/2/18 - will treat for 4-6 weeks   - follow-up with me in 1-2 weeks post D/C in ID clinic .   - weekly lab: CMP, CBC , CRP     ID will sign off.  Plan discussed with primary team     Rei Austin MD, M.Med.Sc.  Staff, Infectious Diseases  Pager: [phone]     SUBJECTIVE:    feels better, no headaches, no neck stiffness.  no nausea, vomiting,  no diarrhea.  appetite is improving.     ROS:  A five-point review of systems was obtained and was negative with the exception of that which is described above.  No Known Allergies    CURRENT ANTI-INFECTIVES:   Vanco IV 9/2-->9/4   Ceftriaxone IV 9/2--> 9/4  Nafcillin 9/4 -->     EXAMINATION: (Recommend ? 5 systems)   Vital Signs: BP (!) 135/100  Pulse 62  Temp 97.6  F (36.4  C) (Oral)  Resp 17  Ht 1.803 m (5' 11\")  Wt 85.1 kg (187 lb 9.8 oz)  SpO2 99%  BMI 26.17 kg/m2   GENERAL:  alert, oriented,  in bed in no acute distress.  HEENT:  surgical site  ok, EVD  EYES:  Eyes have anicteric sclerae without conjunctival injection   ENT:  Oropharynx is moist without exudates or ulcers. Tongue is midline  NECK:  Supple. No  Cervical lymphadenopathy  LUNGS:  Clear to auscultation bilateral.   CARDIOVASCULAR:  Regular rate and rhythm with no murmurs, gallops or rubs.  ABDOMEN:  Normal bowel sounds, " soft, nontender. No appreciable hepatosplenomegaly  SKIN:  No acute rashes.  Line(s) are in place without any surrounding erythema or exudate.   NEUROLOGIC:  Grossly nonfocal. Active x4 extremities    NEW DATA/RESULTS:   Culture Micro   Date Value Ref Range Status   09/07/2018 Culture negative monitoring continues  Preliminary   09/07/2018 PENDING  Preliminary   09/06/2018 Culture negative monitoring continues  Preliminary   09/06/2018 Culture negative monitoring continues  Preliminary   09/05/2018 Culture negative monitoring continues  Preliminary   09/05/2018 Culture negative monitoring continues  Preliminary     Recent Labs   Lab Test  09/05/18   0431  09/02/18   0710   CRP  48.0*  64.0*     Recent Labs   Lab Test  09/07/18   0348  09/06/18   0413  09/05/18   0431  09/04/18   0340  09/03/18   0208  09/02/18   0710   WBC  6.2  6.0  5.9  6.4  7.4  8.7     Recent Labs   Lab Test  09/07/18   0348  09/06/18   0413  09/05/18   0431  09/04/18   0340   CR  0.79  0.64  0.74  0.71   GFRESTIMATED  78  >90  85  89     Hematology Studies  Recent Labs   Lab Test  09/07/18   0348  09/06/18   0413  09/05/18   0431  09/04/18   0340  09/03/18   0208  09/02/18   0710   WBC  6.2  6.0  5.9  6.4  7.4  8.7   HCT  33.7*  33.7*  32.5*  33.3*  29.9*  32.9*   PLT  459*  449  427  368  431  494*     Metabolic  Recent Labs   Lab Test  09/07/18   0348  09/06/18   0413  09/05/18   0431   NA  140  138  138   BUN  9  8  9   CO2  25  27  29   CR  0.79  0.64  0.74   GFRESTIMATED  78  >90  85     Immunologlobulins  Recent Labs   Lab Test  09/05/18   0431  09/02/18   0710   SED  80*  101*

## 2018-09-08 NOTE — PROGRESS NOTES
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    Problem List  1. Ventriculitis with cerebral edema and right posterior occipital tract abscess   2. Staph aureus in CSF  3. Status post VPS shunt removal and EVD placement  4. History of 3rd ventricular arachnoid cyst s/p VPS 7/2018  5. Headaches  6. New onset seizures     Medications:  Current Facility-Administered Medications   Medication     acetaminophen (TYLENOL) tablet 650 mg     buPROPion (WELLBUTRIN SR) 12 hr tablet 200 mg     FLUoxetine (PROzac) capsule 60 mg     heparin lock flush 10 UNIT/ML injection 2-5 mL     heparin lock flush 10 UNIT/ML injection 5-10 mL     heparin lock flush 10 UNIT/ML injection 5-10 mL     hydrALAZINE (APRESOLINE) injection 10-20 mg     labetalol (NORMODYNE/TRANDATE) injection 10-40 mg     levETIRAcetam (KEPPRA) tablet 1,000 mg     levothyroxine (SYNTHROID/LEVOTHROID) tablet 75 mcg     lidocaine (LMX4) cream     lidocaine (LMX4) cream     lidocaine (LMX4) cream     lidocaine 1 % 1 mL     lidocaine 1 % 1 mL     magnesium sulfate 2 g in NS intermittent infusion ([facility] or [facility] C[facility])     magnesium sulfate 4 g in 100 mL sterile water (premade)     metoclopramide (REGLAN) tablet 10 mg    Or     metoclopramide (REGLAN) injection 10 mg     nafcillin IV 2 g vial to attach to  ml bag     naloxone (NARCAN) injection 0.1-0.4 mg     ondansetron (ZOFRAN-ODT) ODT tab 4-8 mg    Or     ondansetron (ZOFRAN) injection 4-8 mg     oxyCODONE IR (ROXICODONE) tablet 5 mg     polyethylene glycol (MIRALAX/GLYCOLAX) Packet 17 g     potassium chloride (KLOR-CON) Packet 20-40 mEq     potassium chloride 10 mEq in 100 mL sterile water intermittent infusion (premix)     potassium  "chloride 20 mEq in 50 mL intermittent infusion     potassium chloride SA (K-DUR/KLOR-CON M) CR tablet 20-40 mEq     potassium phosphate 10 mmol in D5W 250 mL intermittent infusion     potassium phosphate 15 mmol in D5W 250 mL intermittent infusion     potassium phosphate 20 mmol in D5W 250 mL intermittent infusion     potassium phosphate 20 mmol in D5W 500 mL intermittent infusion     potassium phosphate 25 mmol in D5W 500 mL intermittent infusion     prochlorperazine (COMPAZINE) injection 10 mg    Or     prochlorperazine (COMPAZINE) tablet 10 mg     rifampin (RIFADIN) capsule 300 mg     saccharomyces boulardii (FLORASTOR) capsule 250 mg     senna-docusate (SENOKOT-S;PERICOLACE) 8.6-50 MG per tablet 2 tablet     sodium chloride (PF) 0.9% PF flush 10-20 mL     sodium chloride (PF) 0.9% PF flush 3 mL     sodium chloride (PF) 0.9% PF flush 3 mL     sodium chloride (PF) 0.9% PF flush 5-50 mL     24 hour events:   No overnight events.    24 Hour Vital Signs Summary:  /83  Pulse 62  Temp 98.4  F (36.9  C) (Oral)  Resp 20  Ht 1.803 m (5' 11\")  Wt 85 kg (187 lb 6.3 oz)  SpO2 100%  BMI 26.14 kg/m2  Resp: 20   Intake/Output Summary (Last 24 hours) at 09/07/18 0917  Last data filed at 09/07/18 0900   Gross per 24 hour   Intake             2110 ml   Output             2200 ml   Net              -90 ml   EVD clamped yesterday 9/5/2018    Physical Examination:  General: Tired, lying in bed  HEENT: sclera nonicteric  Resp: CTAB  Extremities: no edema  Cards: RRR  Neurological Examination  Mentation - alert, attentive, oriented, follows commands, speech intact and clear.  Cranial nerves - Left homonymous hemianopia, EO Mintact,  Facial expressions symmetric.  Hearing intact to conversation. No dysarthria.  Strong shoulder shrug and head turn bilaterally.  Tongue movements intact.  Motor exam - no pronator drift, no leg drift.  No abnormal movements noted.  Reflexes - 2+ and symmetric.  Sensation - intact to light touch " in all four extremities.  Gait - not assessed  Pronator Drift - absent  Finger to nose - no dysmetria    Labs/Studies:    Recent Labs  Lab 09/08/18  0334 09/07/18 0348 09/06/18 0413 09/05/18  0431    140 138 138   POTASSIUM 3.9 3.3* 3.6 3.8   CHLORIDE 107 106 104 104   CO2 24 25 27 29   ANIONGAP 8 9 8 6   GLC 83 81 84 77   BUN 9 9 8 9   CR 0.68 0.79 0.64 0.74   GFRESTIMATED >90 78 >90 85   GFRESTBLACK >90 >90 >90 >90   EDUIN 8.7 8.7 9.1 8.8   MAG 2.3 2.2 2.2 2.2   PHOS 3.7 3.8 3.8 3.2   PROTTOTAL 7.5 7.2 7.2  --    ALBUMIN 3.0* 2.8* 2.8*  --    BILITOTAL 0.5 0.6 0.3  --    ALKPHOS 93 90 92  --    AST 16 14 18  --    ALT 31 30 31  --        Recent Labs  Lab 09/08/18 0334 09/07/18 0348 09/06/18 0413 09/05/18  0431   WBC 6.6 6.2 6.0 5.9   RBC 3.67* 3.55* 3.54* 3.41*   HGB 11.3* 11.1* 10.9* 10.6*   HCT 34.6* 33.7* 33.7* 32.5*   MCV 94 95 95 95   MCH 30.8 31.3 30.8 31.1   MCHC 32.7 32.9 32.3 32.6   RDW 12.7 12.5 12.4 12.3   * 459* 449 427   CSF 9/7/2018 0305:  WBC 53  Glucose 42  Protein Total 62  CSF 9/6/2018 0413:  WBC 63  Glucose 44  Protein Total 66  CSF 9/5/2018 0431:  WBC 78  Glucose 45  Protein Total 66  No organisms seen on Gram stain, 9/7/2018, 9/6/2018, 9/5/2018, 9/4/2018, 9/3/2018    Imaging:  Mr Brain W/o & W Contrast -   1. Small rim-enhancing fluid collections in the posterior right cerebral hemisphere along the ventriculostomy catheter tract, most likely small abscess along a catheter tract.   2. Confluent abnormal T2 signal surrounding these collections is favored edema.     Assessment/Plan  44 y/o right-handed female w/ PMH of vellum interpositum intracranial arachnoid cyst s/p EVD placement 7/12/2018 c/b seizure and  shunt placement 7/18/2018, unspecified mood disorder, hypothyroidism who was transferred here 9/1/2018 from [place] after a fall at home associated with persisting headache and purulent drainage at occipital  shunt surgical site.     Neuro:  # Shunt  infection/Ventriculitis with tract abscess   # S. aureus in CSF  - Noted to have purulent fluid from  shunt, cultures growing staph aureus.  -  shunt removed, EVD placed on 9/2/2018, clamped on 9/6/2018.  - CSF growing heavy growth S. Aureus. Cultures have been negative since 9/3/2018  - Continue to monitor EVD and ICPs  - MRI brain shows evidence of small abscess along tract with edema   - See plan in ID below     # Hydrocephalus s/p EVD  H/o arachnoid cyst s/p  shunt placement.   - EVD clamped this AM, will see how pt tolerates it    # Seizure  - First seizure post-op 7/12/2018.    - Unclear if fall on 8/30/2018 was secondary to breakthrough seizure.  - Keppra 750 BID   - Continue keppra for now, if patient has worsening mentation then consider EEG monitoring.     # Acute on Chronic headache  - Frontal headache worse this AM  - PRN tylenol and oxy     # Depression  - Holding Wellbutrin 200 at home (recommend to stop medication due to decreased seizure threshold)  - Continue prozac at home dose  - Takes Klonopin PRN for anxiety, monitor closely for withdrawal seizures.     Resp:   - Stable on room air  - Continuous pulse ox monitoring   - Maintain O2 saturations > 92%      CV:    - No issues  - SBP goal: MAP>65     Renal/FEN:   - Strict monitoring of I/O  - Not on any IVFs     Endo:   - No current concerns     Heme:   - No leukocytosis noted  - Hg > 7.0   - Plt > 100k   - INR <1.5      GI:   - No current concerns  - Regular diet     ID:   #Shunt infection/ Ventriculitis with Staph aureus in CSF  - CSF culture 9/1/2018 2330 shows heavy growth of Staph aureus. Most recent Gram stains 9/6/2018 and 9/7/2018 showed no organisms.  - Initially started on CNS dosing of vancomycin and ceftriaxone.    - Cultures growing MSSA.  Last (+) culture 9/2 (ventricular cath tip)  - Switched to nafcillin 2gm Q4 9/4/18 per ID consult  - Added rifampin 9/6  - Serial CSF cultures looking for clearance of infection  - Monitor CSF  WBCs (0->124->64->78->), protein, glucose    FEN: Regular diet  PPx:- SCDs   Lines: PIV x2, EVD  Code: Full Code    Patient seen and discussed with Dr. Sunita Davison MD  Stroke fellow

## 2018-09-08 NOTE — PROGRESS NOTES
Neurosurgery Progress Note    S: headaches improved    O:  Exam:  General: Awake;  Alert, In No Acute Distress  Pulm: Breathing Comfortably on room air  Mental status: Oriented x 3  Cranial Nerves: Cranial Nerves II-XII Intact Bilaterally with exception of left homonymous hemianopsia  Strength:      Del Tr Bi WE WF Gr  R 5 5 5 5 5 5  L 5 5 5 5 5 5     HF KE KF DF PF EHL  R 5 5 5 5 5 5  L 5 5 5 5 5 5    Pronator Drift: Absent  Sensory: Intact to Light Touch  INCISION: ventriculostomy site covered by primapore     Assessment:   #ventriculitis with associated cerebral edema due to ventriculoperitoneal shunt placement at outside hospital   #history of obstructive hydrocephalus due to cystic lesion     Plan by problem:     Neuro:   Anti-epileptics: continue prior to admission keppra  EVD: clamped; will keep if plan for cyst fenestration  Continue prior to admission antidepressant   See ID section for antibiotic treatment of intracranial infection    Cardiovascular: no issues    Pulmonary: Incentive spirometry     Gastrointestinal: regular diet     Renal: monitor intake and output; daily BMP     Heme: daily CBC     Endocrine: continue prior to admission levothyroxine    Infectious ID consulted; appreciate recommendations. Nafcillin and rifampin for MSSA. Serial CSF cultures. ESR and CRP every 72 hours. ID recommending 10 negative CSF cultures    PT/OT: home with assist + outpatient PT    DVT prophylaxis: Ambulate TID. Sequential compression devices; chemoprophylaxis held due to bleeding risk     Ulcer prophylaxis: none indicated    DISPO:  pending resolution of critical illness.     Barriers: EVD, critical illness, surgical decision making      -----------------------------------  Luz Mary MD, MS  Neurosurgery PGY-2

## 2018-09-09 LAB
ALBUMIN SERPL-MCNC: 2.8 G/DL (ref 3.4–5)
ALP SERPL-CCNC: 88 U/L (ref 40–150)
ALT SERPL W P-5'-P-CCNC: 30 U/L (ref 0–50)
ANION GAP SERPL CALCULATED.3IONS-SCNC: 8 MMOL/L (ref 3–14)
APPEARANCE CSF: ABNORMAL
AST SERPL W P-5'-P-CCNC: 17 U/L (ref 0–45)
BACTERIA SPEC CULT: NO GROWTH
BILIRUB SERPL-MCNC: 0.5 MG/DL (ref 0.2–1.3)
BUN SERPL-MCNC: 8 MG/DL (ref 7–30)
CALCIUM SERPL-MCNC: 8.8 MG/DL (ref 8.5–10.1)
CHLORIDE SERPL-SCNC: 106 MMOL/L (ref 94–109)
CO2 SERPL-SCNC: 24 MMOL/L (ref 20–32)
COLOR CSF: COLORLESS
CREAT SERPL-MCNC: 0.68 MG/DL (ref 0.52–1.04)
EOSINOPHIL NFR CSF MANUAL: 2 %
ERYTHROCYTE [DISTWIDTH] IN BLOOD BY AUTOMATED COUNT: 12.9 % (ref 10–15)
GFR SERPL CREATININE-BSD FRML MDRD: >90 ML/MIN/1.7M2
GLUCOSE CSF-MCNC: 52 MG/DL (ref 40–70)
GLUCOSE SERPL-MCNC: 84 MG/DL (ref 70–99)
GRAM STN SPEC: NORMAL
GRAM STN SPEC: NORMAL
HCT VFR BLD AUTO: 33.2 % (ref 35–47)
HGB BLD-MCNC: 10.7 G/DL (ref 11.7–15.7)
LYMPH ABN NFR CSF MANUAL: 93 %
Lab: NORMAL
MAGNESIUM SERPL-MCNC: 2.4 MG/DL (ref 1.6–2.3)
MCH RBC QN AUTO: 30.5 PG (ref 26.5–33)
MCHC RBC AUTO-ENTMCNC: 32.2 G/DL (ref 31.5–36.5)
MCV RBC AUTO: 95 FL (ref 78–100)
NEUTROPHILS NFR CSF MANUAL: 5 %
PHOSPHATE SERPL-MCNC: 3.4 MG/DL (ref 2.5–4.5)
PLATELET # BLD AUTO: 442 10E9/L (ref 150–450)
POTASSIUM SERPL-SCNC: 3.4 MMOL/L (ref 3.4–5.3)
PROT CSF-MCNC: 47 MG/DL (ref 15–60)
PROT SERPL-MCNC: 6.9 G/DL (ref 6.8–8.8)
RBC # BLD AUTO: 3.51 10E12/L (ref 3.8–5.2)
RBC # CSF MANUAL: 190 /UL (ref 0–2)
SODIUM SERPL-SCNC: 139 MMOL/L (ref 133–144)
SPECIMEN SOURCE: NORMAL
SPECIMEN SOURCE: NORMAL
TUBE # CSF: ABNORMAL #
WBC # BLD AUTO: 6.8 10E9/L (ref 4–11)
WBC # CSF MANUAL: 34 /UL (ref 0–5)

## 2018-09-09 PROCEDURE — 84157 ASSAY OF PROTEIN OTHER: CPT | Performed by: STUDENT IN AN ORGANIZED HEALTH CARE EDUCATION/TRAINING PROGRAM

## 2018-09-09 PROCEDURE — 85027 COMPLETE CBC AUTOMATED: CPT | Performed by: INTERNAL MEDICINE

## 2018-09-09 PROCEDURE — 25000128 H RX IP 250 OP 636: Performed by: NURSE PRACTITIONER

## 2018-09-09 PROCEDURE — 25000132 ZZH RX MED GY IP 250 OP 250 PS 637: Performed by: STUDENT IN AN ORGANIZED HEALTH CARE EDUCATION/TRAINING PROGRAM

## 2018-09-09 PROCEDURE — 87205 SMEAR GRAM STAIN: CPT | Performed by: STUDENT IN AN ORGANIZED HEALTH CARE EDUCATION/TRAINING PROGRAM

## 2018-09-09 PROCEDURE — 89051 BODY FLUID CELL COUNT: CPT | Performed by: STUDENT IN AN ORGANIZED HEALTH CARE EDUCATION/TRAINING PROGRAM

## 2018-09-09 PROCEDURE — 87075 CULTR BACTERIA EXCEPT BLOOD: CPT | Performed by: STUDENT IN AN ORGANIZED HEALTH CARE EDUCATION/TRAINING PROGRAM

## 2018-09-09 PROCEDURE — 20000004 ZZH R&B ICU UMMC

## 2018-09-09 PROCEDURE — 80053 COMPREHEN METABOLIC PANEL: CPT | Performed by: INTERNAL MEDICINE

## 2018-09-09 PROCEDURE — 25000125 ZZHC RX 250: Performed by: STUDENT IN AN ORGANIZED HEALTH CARE EDUCATION/TRAINING PROGRAM

## 2018-09-09 PROCEDURE — 84100 ASSAY OF PHOSPHORUS: CPT | Performed by: INTERNAL MEDICINE

## 2018-09-09 PROCEDURE — 83735 ASSAY OF MAGNESIUM: CPT | Performed by: INTERNAL MEDICINE

## 2018-09-09 PROCEDURE — 40000077 ZZH STATISTIC ICP MONITORING

## 2018-09-09 PROCEDURE — 82945 GLUCOSE OTHER FLUID: CPT | Performed by: STUDENT IN AN ORGANIZED HEALTH CARE EDUCATION/TRAINING PROGRAM

## 2018-09-09 PROCEDURE — 25000128 H RX IP 250 OP 636: Performed by: STUDENT IN AN ORGANIZED HEALTH CARE EDUCATION/TRAINING PROGRAM

## 2018-09-09 PROCEDURE — 87070 CULTURE OTHR SPECIMN AEROBIC: CPT | Performed by: STUDENT IN AN ORGANIZED HEALTH CARE EDUCATION/TRAINING PROGRAM

## 2018-09-09 RX ADMIN — NAFCILLIN SODIUM 2 G: 2 INJECTION, POWDER, LYOPHILIZED, FOR SOLUTION INTRAMUSCULAR; INTRAVENOUS at 04:25

## 2018-09-09 RX ADMIN — NAFCILLIN SODIUM 2 G: 2 INJECTION, POWDER, LYOPHILIZED, FOR SOLUTION INTRAMUSCULAR; INTRAVENOUS at 16:07

## 2018-09-09 RX ADMIN — NAFCILLIN SODIUM 2 G: 2 INJECTION, POWDER, LYOPHILIZED, FOR SOLUTION INTRAMUSCULAR; INTRAVENOUS at 08:24

## 2018-09-09 RX ADMIN — ACETAMINOPHEN 650 MG: 325 TABLET, FILM COATED ORAL at 15:21

## 2018-09-09 RX ADMIN — LEVETIRACETAM 1000 MG: 500 TABLET ORAL at 20:43

## 2018-09-09 RX ADMIN — NAFCILLIN SODIUM 2 G: 2 INJECTION, POWDER, LYOPHILIZED, FOR SOLUTION INTRAMUSCULAR; INTRAVENOUS at 11:56

## 2018-09-09 RX ADMIN — OXYCODONE HYDROCHLORIDE 5 MG: 5 TABLET ORAL at 03:06

## 2018-09-09 RX ADMIN — RIFAMPIN 300 MG: 300 CAPSULE ORAL at 20:43

## 2018-09-09 RX ADMIN — ACETAMINOPHEN 650 MG: 325 TABLET, FILM COATED ORAL at 01:56

## 2018-09-09 RX ADMIN — HYDRALAZINE HYDROCHLORIDE 10 MG: 20 INJECTION INTRAMUSCULAR; INTRAVENOUS at 00:38

## 2018-09-09 RX ADMIN — POTASSIUM CHLORIDE 20 MEQ: 750 TABLET, EXTENDED RELEASE ORAL at 08:18

## 2018-09-09 RX ADMIN — ACETAMINOPHEN 650 MG: 325 TABLET, FILM COATED ORAL at 08:18

## 2018-09-09 RX ADMIN — NAFCILLIN SODIUM 2 G: 2 INJECTION, POWDER, LYOPHILIZED, FOR SOLUTION INTRAMUSCULAR; INTRAVENOUS at 21:24

## 2018-09-09 RX ADMIN — Medication 250 MG: at 20:43

## 2018-09-09 RX ADMIN — HEPARIN, PORCINE (PF) 10 UNIT/ML INTRAVENOUS SYRINGE 5 ML: at 16:14

## 2018-09-09 RX ADMIN — LEVOTHYROXINE SODIUM 75 MCG: 75 TABLET ORAL at 08:18

## 2018-09-09 RX ADMIN — LEVETIRACETAM 1000 MG: 500 TABLET ORAL at 08:18

## 2018-09-09 RX ADMIN — Medication 250 MG: at 08:18

## 2018-09-09 RX ADMIN — NAFCILLIN SODIUM 2 G: 2 INJECTION, POWDER, LYOPHILIZED, FOR SOLUTION INTRAMUSCULAR; INTRAVENOUS at 00:22

## 2018-09-09 RX ADMIN — RIFAMPIN 300 MG: 300 CAPSULE ORAL at 12:01

## 2018-09-09 RX ADMIN — BUPROPION HYDROCHLORIDE 200 MG: 200 TABLET, EXTENDED RELEASE ORAL at 08:18

## 2018-09-09 RX ADMIN — FLUOXETINE 60 MG: 20 CAPSULE ORAL at 08:18

## 2018-09-09 RX ADMIN — ACETAMINOPHEN 650 MG: 325 TABLET, FILM COATED ORAL at 20:43

## 2018-09-09 ASSESSMENT — ACTIVITIES OF DAILY LIVING (ADL)
ADLS_ACUITY_SCORE: 9

## 2018-09-09 ASSESSMENT — PAIN DESCRIPTION - DESCRIPTORS
DESCRIPTORS: HEADACHE

## 2018-09-09 ASSESSMENT — VISUAL ACUITY
OU: NORMAL ACUITY

## 2018-09-09 NOTE — PLAN OF CARE
Problem: Pain, Acute (Adult)  Goal: Identify Related Risk Factors and Signs and Symptoms  Related risk factors and signs and symptoms are identified upon initiation of [facility] [employer] Clinical Practice Guideline (CPG).   Outcome: No Change  D/I: EVD clamped, ICP 1-9. Spinal fluid sent to lab per MD. Neuro intact. Vital signs stable, afebrile. Tolerating regular diet. Voiding spontaneously. Kind of restless but cooperative. Tylenol for pain.   A: Pt stable, continuing with IV antibiotics.   P: Continue to monitor ICP. Administer antibiotics. Update MD with concerns.

## 2018-09-09 NOTE — PLAN OF CARE
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    Neuro: Unchanged, intact except for L vision field EVD clamped at 20 above EAM with ICPs 0-10  CV: NSR. BP stable, PRN hydralazine X1  Resp: RA, LS clear  GI: Using bathroom, loose stools X2. Regular diet tolerated well. Bowel meds held for loose stools.   : adequate uop  Skin:  Posterior head incision tender. EVD with dressing covering insertion sites. Patient has abrasions on legs and knees noted.   LAD's: L DL PICC, L PIV  Muc: independent with SBA only. Tylenol and oxycodone overnight PRN.  Gtts: abx  Plan:  OR middle of next week to replace  shunt possibly. Replace electrolytes as needed from labs.  See flowsheets for additional documentation.

## 2018-09-10 ENCOUNTER — APPOINTMENT (OUTPATIENT)
Dept: PHYSICAL THERAPY | Facility: CLINIC | Age: 43
End: 2018-09-10
Attending: NEUROLOGICAL SURGERY
Payer: COMMERCIAL

## 2018-09-10 ENCOUNTER — APPOINTMENT (OUTPATIENT)
Dept: CT IMAGING | Facility: CLINIC | Age: 43
End: 2018-09-10
Attending: STUDENT IN AN ORGANIZED HEALTH CARE EDUCATION/TRAINING PROGRAM
Payer: COMMERCIAL

## 2018-09-10 LAB
ALBUMIN SERPL-MCNC: 3 G/DL (ref 3.4–5)
ALP SERPL-CCNC: 92 U/L (ref 40–150)
ALT SERPL W P-5'-P-CCNC: 35 U/L (ref 0–50)
ANION GAP SERPL CALCULATED.3IONS-SCNC: 8 MMOL/L (ref 3–14)
APPEARANCE CSF: ABNORMAL
AST SERPL W P-5'-P-CCNC: 20 U/L (ref 0–45)
BACTERIA SPEC CULT: NO GROWTH
BILIRUB SERPL-MCNC: 0.5 MG/DL (ref 0.2–1.3)
BUN SERPL-MCNC: 9 MG/DL (ref 7–30)
CALCIUM SERPL-MCNC: 8.8 MG/DL (ref 8.5–10.1)
CHLORIDE SERPL-SCNC: 104 MMOL/L (ref 94–109)
CO2 SERPL-SCNC: 26 MMOL/L (ref 20–32)
COLOR CSF: COLORLESS
CREAT SERPL-MCNC: 0.71 MG/DL (ref 0.52–1.04)
ERYTHROCYTE [DISTWIDTH] IN BLOOD BY AUTOMATED COUNT: 13 % (ref 10–15)
GFR SERPL CREATININE-BSD FRML MDRD: 89 ML/MIN/1.7M2
GLUCOSE CSF-MCNC: 48 MG/DL (ref 40–70)
GLUCOSE SERPL-MCNC: 85 MG/DL (ref 70–99)
GRAM STN SPEC: NORMAL
HCT VFR BLD AUTO: 35.2 % (ref 35–47)
HGB BLD-MCNC: 11.2 G/DL (ref 11.7–15.7)
MAGNESIUM SERPL-MCNC: 2.3 MG/DL (ref 1.6–2.3)
MCH RBC QN AUTO: 30.8 PG (ref 26.5–33)
MCHC RBC AUTO-ENTMCNC: 31.8 G/DL (ref 31.5–36.5)
MCV RBC AUTO: 97 FL (ref 78–100)
PHOSPHATE SERPL-MCNC: 3.9 MG/DL (ref 2.5–4.5)
PLATELET # BLD AUTO: 462 10E9/L (ref 150–450)
POTASSIUM SERPL-SCNC: 3.6 MMOL/L (ref 3.4–5.3)
PROT CSF-MCNC: 43 MG/DL (ref 15–60)
PROT SERPL-MCNC: 7.2 G/DL (ref 6.8–8.8)
RBC # BLD AUTO: 3.64 10E12/L (ref 3.8–5.2)
RBC # CSF MANUAL: 19 /UL (ref 0–2)
SODIUM SERPL-SCNC: 138 MMOL/L (ref 133–144)
SPECIMEN SOURCE: NORMAL
SPECIMEN SOURCE: NORMAL
TUBE # CSF: 1 #
WBC # BLD AUTO: 6.3 10E9/L (ref 4–11)
WBC # CSF MANUAL: 0 /UL (ref 0–5)

## 2018-09-10 PROCEDURE — 40000193 ZZH STATISTIC PT WARD VISIT: Performed by: REHABILITATION PRACTITIONER

## 2018-09-10 PROCEDURE — 97110 THERAPEUTIC EXERCISES: CPT | Mod: GP | Performed by: REHABILITATION PRACTITIONER

## 2018-09-10 PROCEDURE — 25000132 ZZH RX MED GY IP 250 OP 250 PS 637: Performed by: STUDENT IN AN ORGANIZED HEALTH CARE EDUCATION/TRAINING PROGRAM

## 2018-09-10 PROCEDURE — 25000125 ZZHC RX 250: Performed by: STUDENT IN AN ORGANIZED HEALTH CARE EDUCATION/TRAINING PROGRAM

## 2018-09-10 PROCEDURE — 80053 COMPREHEN METABOLIC PANEL: CPT | Performed by: INTERNAL MEDICINE

## 2018-09-10 PROCEDURE — 87070 CULTURE OTHR SPECIMN AEROBIC: CPT | Performed by: STUDENT IN AN ORGANIZED HEALTH CARE EDUCATION/TRAINING PROGRAM

## 2018-09-10 PROCEDURE — 70450 CT HEAD/BRAIN W/O DYE: CPT

## 2018-09-10 PROCEDURE — 97112 NEUROMUSCULAR REEDUCATION: CPT | Mod: GP | Performed by: REHABILITATION PRACTITIONER

## 2018-09-10 PROCEDURE — 84100 ASSAY OF PHOSPHORUS: CPT | Performed by: INTERNAL MEDICINE

## 2018-09-10 PROCEDURE — 89050 BODY FLUID CELL COUNT: CPT | Performed by: STUDENT IN AN ORGANIZED HEALTH CARE EDUCATION/TRAINING PROGRAM

## 2018-09-10 PROCEDURE — 12000008 ZZH R&B INTERMEDIATE UMMC

## 2018-09-10 PROCEDURE — 87205 SMEAR GRAM STAIN: CPT | Performed by: STUDENT IN AN ORGANIZED HEALTH CARE EDUCATION/TRAINING PROGRAM

## 2018-09-10 PROCEDURE — 25000125 ZZHC RX 250: Performed by: NURSE PRACTITIONER

## 2018-09-10 PROCEDURE — 83735 ASSAY OF MAGNESIUM: CPT | Performed by: INTERNAL MEDICINE

## 2018-09-10 PROCEDURE — 87075 CULTR BACTERIA EXCEPT BLOOD: CPT | Performed by: STUDENT IN AN ORGANIZED HEALTH CARE EDUCATION/TRAINING PROGRAM

## 2018-09-10 PROCEDURE — 82945 GLUCOSE OTHER FLUID: CPT | Performed by: STUDENT IN AN ORGANIZED HEALTH CARE EDUCATION/TRAINING PROGRAM

## 2018-09-10 PROCEDURE — 85027 COMPLETE CBC AUTOMATED: CPT | Performed by: INTERNAL MEDICINE

## 2018-09-10 PROCEDURE — 40000077 ZZH STATISTIC ICP MONITORING

## 2018-09-10 PROCEDURE — 84157 ASSAY OF PROTEIN OTHER: CPT | Performed by: STUDENT IN AN ORGANIZED HEALTH CARE EDUCATION/TRAINING PROGRAM

## 2018-09-10 RX ORDER — LIDOCAINE HYDROCHLORIDE 10 MG/ML
1 INJECTION, SOLUTION EPIDURAL; INFILTRATION; INTRACAUDAL; PERINEURAL ONCE
Status: COMPLETED | OUTPATIENT
Start: 2018-09-10 | End: 2018-09-10

## 2018-09-10 RX ADMIN — LEVETIRACETAM 1000 MG: 500 TABLET ORAL at 08:43

## 2018-09-10 RX ADMIN — RIFAMPIN 300 MG: 300 CAPSULE ORAL at 13:13

## 2018-09-10 RX ADMIN — NAFCILLIN SODIUM 2 G: 2 INJECTION, POWDER, LYOPHILIZED, FOR SOLUTION INTRAMUSCULAR; INTRAVENOUS at 00:53

## 2018-09-10 RX ADMIN — Medication 250 MG: at 21:00

## 2018-09-10 RX ADMIN — NAFCILLIN SODIUM 2 G: 2 INJECTION, POWDER, LYOPHILIZED, FOR SOLUTION INTRAMUSCULAR; INTRAVENOUS at 12:00

## 2018-09-10 RX ADMIN — LEVOTHYROXINE SODIUM 75 MCG: 75 TABLET ORAL at 08:44

## 2018-09-10 RX ADMIN — BUPROPION HYDROCHLORIDE 200 MG: 200 TABLET, EXTENDED RELEASE ORAL at 08:42

## 2018-09-10 RX ADMIN — RIFAMPIN 300 MG: 300 CAPSULE ORAL at 21:19

## 2018-09-10 RX ADMIN — LIDOCAINE HYDROCHLORIDE 1 ML: 10 INJECTION, SOLUTION EPIDURAL; INFILTRATION; INTRACAUDAL; PERINEURAL at 09:55

## 2018-09-10 RX ADMIN — OXYCODONE HYDROCHLORIDE 5 MG: 5 TABLET ORAL at 21:08

## 2018-09-10 RX ADMIN — NAFCILLIN SODIUM 2 G: 2 INJECTION, POWDER, LYOPHILIZED, FOR SOLUTION INTRAMUSCULAR; INTRAVENOUS at 16:13

## 2018-09-10 RX ADMIN — NAFCILLIN SODIUM 2 G: 2 INJECTION, POWDER, LYOPHILIZED, FOR SOLUTION INTRAMUSCULAR; INTRAVENOUS at 08:46

## 2018-09-10 RX ADMIN — NAFCILLIN SODIUM 2 G: 2 INJECTION, POWDER, LYOPHILIZED, FOR SOLUTION INTRAMUSCULAR; INTRAVENOUS at 04:38

## 2018-09-10 RX ADMIN — OXYCODONE HYDROCHLORIDE 5 MG: 5 TABLET ORAL at 08:39

## 2018-09-10 RX ADMIN — ACETAMINOPHEN 650 MG: 325 TABLET, FILM COATED ORAL at 17:14

## 2018-09-10 RX ADMIN — ACETAMINOPHEN 650 MG: 325 TABLET, FILM COATED ORAL at 04:43

## 2018-09-10 RX ADMIN — POTASSIUM CHLORIDE 20 MEQ: 750 TABLET, EXTENDED RELEASE ORAL at 08:41

## 2018-09-10 RX ADMIN — NAFCILLIN SODIUM 2 G: 2 INJECTION, POWDER, LYOPHILIZED, FOR SOLUTION INTRAMUSCULAR; INTRAVENOUS at 21:04

## 2018-09-10 RX ADMIN — FLUOXETINE 60 MG: 20 CAPSULE ORAL at 08:42

## 2018-09-10 RX ADMIN — LEVETIRACETAM 1000 MG: 500 TABLET ORAL at 21:01

## 2018-09-10 ASSESSMENT — ACTIVITIES OF DAILY LIVING (ADL)
ADLS_ACUITY_SCORE: 11
ADLS_ACUITY_SCORE: 9
ADLS_ACUITY_SCORE: 11
ADLS_ACUITY_SCORE: 9
ADLS_ACUITY_SCORE: 9
ADLS_ACUITY_SCORE: 11

## 2018-09-10 ASSESSMENT — PAIN DESCRIPTION - DESCRIPTORS
DESCRIPTORS: HEADACHE

## 2018-09-10 ASSESSMENT — VISUAL ACUITY
OU: NORMAL ACUITY

## 2018-09-10 NOTE — PROGRESS NOTES
Neurosurgery Progress Note    S:  No major complaints    O:  Exam:  General: Awake;  Alert, In No Acute Distress  Pulm: Breathing Comfortably on room air  Mental status: Oriented x 3  Cranial Nerves: Cranial Nerves II-XII Intact Bilaterally with exception of left homonymous hemianopsia  Strength:      Del Tr Bi WE WF Gr  R 5 5 5 5 5 5  L 5 5 5 5 5 5     HF KE KF DF PF EHL  R 5 5 5 5 5 5  L 5 5 5 5 5 5    Pronator Drift: Absent  Sensory: Intact to Light Touch  INCISION: ventriculostomy site covered by primapore     Assessment:   #ventriculitis with associated cerebral edema due to ventriculoperitoneal shunt placement at outside hospital   #history of obstructive hydrocephalus due to cystic lesion     Plan by problem:     Neuro:   Anti-epileptics: continue prior to admission keppra  EVD: clamped; will keep for daily CSF cultures  Continue prior to admission antidepressant   See ID section for antibiotic treatment of intracranial infection    Cardiovascular: no issues    Pulmonary: Incentive spirometry     Gastrointestinal: regular diet     Renal: monitor intake and output; daily BMP     Heme: daily CBC     Endocrine: continue prior to admission levothyroxine    Infectious ID consulted; appreciate recommendations. Nafcillin and rifampin for MSSA. Serial CSF cultures. ESR and CRP every 72 hours. ID recommending 10 negative CSF cultures    PT/OT: home with assist + outpatient PT and OT    DVT prophylaxis: Ambulate TID. Sequential compression devices; chemoprophylaxis held due to bleeding risk     Ulcer prophylaxis: none indicated    DISPO:  pending resolution of critical illness.     Barriers: EVD, critical illness, surgical decision making    -----------------------------------  Luz Mary MD, MS  Neurosurgery PGY-2

## 2018-09-10 NOTE — PROGRESS NOTES
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    Problem List  1. Ventriculitis with cerebral edema and right posterior occipital tract abscess   2. Staph aureus in CSF  3. Status post VPS shunt removal and EVD placement  4. History of 3rd ventricular arachnoid cyst s/p VPS 7/2018  5. Headaches  6. New onset seizures     Medications:  Current Facility-Administered Medications   Medication     acetaminophen (TYLENOL) tablet 650 mg     buPROPion (WELLBUTRIN SR) 12 hr tablet 200 mg     FLUoxetine (PROzac) capsule 60 mg     heparin lock flush 10 UNIT/ML injection 2-5 mL     heparin lock flush 10 UNIT/ML injection 5-10 mL     heparin lock flush 10 UNIT/ML injection 5-10 mL     hydrALAZINE (APRESOLINE) injection 10-20 mg     labetalol (NORMODYNE/TRANDATE) injection 10-40 mg     levETIRAcetam (KEPPRA) tablet 1,000 mg     levothyroxine (SYNTHROID/LEVOTHROID) tablet 75 mcg     lidocaine (LMX4) cream     lidocaine (LMX4) cream     lidocaine (LMX4) cream     lidocaine 1 % 1 mL     lidocaine 1 % 1 mL     magnesium sulfate 2 g in NS intermittent infusion ([facility] or [facility])     magnesium sulfate 4 g in 100 mL sterile water (premade)     metoclopramide (REGLAN) tablet 10 mg    Or     metoclopramide (REGLAN) injection 10 mg     nafcillin IV 2 g vial to attach to  ml bag     naloxone (NARCAN) injection 0.1-0.4 mg     ondansetron (ZOFRAN-ODT) ODT tab 4-8 mg    Or     ondansetron (ZOFRAN) injection 4-8 mg     oxyCODONE IR (ROXICODONE) tablet 5 mg     polyethylene glycol (MIRALAX/GLYCOLAX) Packet 17 g     potassium chloride (KLOR-CON) Packet 20-40 mEq     potassium chloride 10 mEq in 100 mL sterile water intermittent infusion (premix)     potassium  "chloride 20 mEq in 50 mL intermittent infusion     potassium chloride SA (K-DUR/KLOR-CON M) CR tablet 20-40 mEq     potassium phosphate 10 mmol in D5W 250 mL intermittent infusion     potassium phosphate 15 mmol in D5W 250 mL intermittent infusion     potassium phosphate 20 mmol in D5W 250 mL intermittent infusion     potassium phosphate 20 mmol in D5W 500 mL intermittent infusion     potassium phosphate 25 mmol in D5W 500 mL intermittent infusion     prochlorperazine (COMPAZINE) injection 10 mg    Or     prochlorperazine (COMPAZINE) tablet 10 mg     rifampin (RIFADIN) capsule 300 mg     saccharomyces boulardii (FLORASTOR) capsule 250 mg     senna-docusate (SENOKOT-S;PERICOLACE) 8.6-50 MG per tablet 2 tablet     sodium chloride (PF) 0.9% PF flush 10-20 mL     sodium chloride (PF) 0.9% PF flush 3 mL     sodium chloride (PF) 0.9% PF flush 3 mL     sodium chloride (PF) 0.9% PF flush 5-50 mL     24 hour events:   No overnight events.    24 Hour Vital Signs Summary:  BP (!) 147/91  Pulse 62  Temp 98.3  F (36.8  C) (Axillary)  Resp 27  Ht 1.803 m (5' 11\")  Wt 86.1 kg (189 lb 13.1 oz)  SpO2 99%  BMI 26.47 kg/m2  Resp: 27   Intake/Output Summary (Last 24 hours) at 09/07/18 0917  Last data filed at 09/07/18 0900   Gross per 24 hour   Intake             2110 ml   Output             2200 ml   Net              -90 ml   EVD clamped yesterday 9/5/2018    Physical Examination:  General: Tired, lying in bed  HEENT: sclera nonicteric  Resp: CTAB  Extremities: no edema  Cards: RRR  Neurological Examination  Mentation - alert, attentive, oriented, follows commands, speech intact and clear.  Cranial nerves - Left homonymous hemianopia, EO Mintact,  Facial expressions asymmetric- blink rate reduced on the right intermittently.  Hearing intact to conversation. No dysarthria.  Strong shoulder shrug and head turn bilaterally.  Tongue movements intact.  Motor exam - no pronator drift, no leg drift.  No abnormal movements " noted.  Sensation - intact to light touch in all four extremities.  Gait - not assessed  Pronator Drift - absent  Finger to nose - no dysmetria    Labs/Studies:    Recent Labs  Lab 09/09/18  0423 09/08/18  0334 09/07/18 0348 09/06/18  0413    140 140 138   POTASSIUM 3.4 3.9 3.3* 3.6   CHLORIDE 106 107 106 104   CO2 24 24 25 27   ANIONGAP 8 8 9 8   GLC 84 83 81 84   BUN 8 9 9 8   CR 0.68 0.68 0.79 0.64   GFRESTIMATED >90 >90 78 >90   GFRESTBLACK >90 >90 >90 >90   EDUIN 8.8 8.7 8.7 9.1   MAG 2.4* 2.3 2.2 2.2   PHOS 3.4 3.7 3.8 3.8   PROTTOTAL 6.9 7.5 7.2 7.2   ALBUMIN 2.8* 3.0* 2.8* 2.8*   BILITOTAL 0.5 0.5 0.6 0.3   ALKPHOS 88 93 90 92   AST 17 16 14 18   ALT 30 31 30 31       Recent Labs  Lab 09/09/18  0423 09/08/18  0334 09/07/18 0348 09/06/18  0413   WBC 6.8 6.6 6.2 6.0   RBC 3.51* 3.67* 3.55* 3.54*   HGB 10.7* 11.3* 11.1* 10.9*   HCT 33.2* 34.6* 33.7* 33.7*   MCV 95 94 95 95   MCH 30.5 30.8 31.3 30.8   MCHC 32.2 32.7 32.9 32.3   RDW 12.9 12.7 12.5 12.4    462* 459* 449   CSF 9/7/2018 0305:  WBC 53  Glucose 42  Protein Total 62  CSF 9/6/2018 0413:  WBC 63  Glucose 44  Protein Total 66  CSF 9/5/2018 0431:  WBC 78  Glucose 45  Protein Total 66  No organisms seen on Gram stain, 9/7/2018, 9/6/2018, 9/5/2018, 9/4/2018, 9/3/2018    Imaging:  Mr Brain W/o & W Contrast -   1. Small rim-enhancing fluid collections in the posterior right cerebral hemisphere along the ventriculostomy catheter tract, most likely small abscess along a catheter tract.   2. Confluent abnormal T2 signal surrounding these collections is favored edema.     Assessment/Plan  42 y/o right-handed female w/ PMH of vellum interpositum intracranial arachnoid cyst s/p EVD placement 7/12/2018 c/b seizure and  shunt placement 7/18/2018, unspecified mood disorder, hypothyroidism who was transferred here 9/1/2018 from [place] after a fall at home associated with persisting headache and purulent drainage at occipital  shunt surgical  site.     Neuro:  # Shunt infection/Ventriculitis with tract abscess   # S. aureus in CSF  - Noted to have purulent fluid from  shunt, cultures growing staph aureus.  -  shunt removed, EVD placed on 9/2/2018, clamped on 9/6/2018.  - CSF growing heavy growth S. Aureus. Cultures have been negative since 9/3/2018  - Continue to monitor EVD and ICPs  - MRI brain shows evidence of small abscess along tract with edema   - See plan in ID below     # Hydrocephalus s/p EVD  H/o arachnoid cyst s/p  shunt placement.   - EVD clamped, tolerating well.    # Seizure  - First seizure post-op 7/12/2018.    - Unclear if fall on 8/30/2018 was secondary to breakthrough seizure.  - Keppra 750 BID   - Continue keppra for now, if patient has worsening mentation then consider EEG monitoring.     # Acute on Chronic headache  - Frontal headache worse this AM  - PRN tylenol and oxy     # Depression  - Holding Wellbutrin 200 at home (recommend to stop medication due to decreased seizure threshold)  - Continue prozac at home dose  - Takes Klonopin PRN for anxiety, monitor closely for withdrawal seizures.     Resp:   - Stable on room air  - Continuous pulse ox monitoring   - Maintain O2 saturations > 92%      CV:    - No issues  - SBP goal: MAP>65     Renal/FEN:   - Strict monitoring of I/O  - Not on any IVFs     Endo:   - No current concerns     Heme:   - No leukocytosis noted  - Hg > 7.0   - Plt > 100k   - INR <1.5      GI:   - No current concerns  - Regular diet     ID:   #Shunt infection/ Ventriculitis with Staph aureus in CSF  - CSF culture 9/1/2018 2330 shows heavy growth of Staph aureus. Most recent Gram stains 9/6/2018 and 9/7/2018 showed no organisms.  - Initially started on CNS dosing of vancomycin and ceftriaxone.    - Cultures growing MSSA.  Last (+) culture 9/2 (ventricular cath tip)  - Switched to nafcillin 2gm Q4 9/4/18 per ID consult  - Added rifampin 9/6  - Serial CSF cultures looking for clearance of infection  - Monitor  CSF WBCs (0->124->64->78->), protein, glucose    FEN: Regular diet  PPx:- SCDs   Lines: PIV x2, EVD  Code: Full Code    Patient seen and discussed with Dr. Sunita Davison MD  Stroke fellow

## 2018-09-10 NOTE — PLAN OF CARE
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     Neuro: Unchanged, intact except for L vision field EVD clamped, ICPs 1-9Tylenol a PRN.  CV: NSR with brief bradycardia in upper 50s. BP stable  Resp: RA, LS clear  GI: Using bathroom, loose stools X1. Regular diet tolerated well. Bowel meds held for loose stools.   : adequate uop  Skin:  Posterior head incision tender. EVD with dressing covering insertion sites. Patient has abrasions on legs and knees noted.   LAD's: L DL PICC, L PIV  Muc: independent with SBA only.   Gtts: TKO for abx  Plan:  OR middle of next week to replace  shunt possibly pending neg CSF cultures. Replace K per protocol.  See flowsheets for additional documentation.

## 2018-09-10 NOTE — PLAN OF CARE
Problem: Patient Care Overview  Goal: Plan of Care/Patient Progress Review  4A PT- Pt has met all goals, is SBA for functional mobility with deficits related to visual field cut, safety awareness, impulsivity. Pt reports no concerns regarding discharge home. Educated pt in the importance of continuing functional mobility and aerobic activity during recovery period.  Pt verbalized understanding. Pt discharged from PT at this time and will follow up with primary MD as needed.    Pt to continue with walking with nursing staff assist in halls for conditioning (please prompt to follow visual field cut strategies) as well as written standing strength and balance HEP pt has (pt to notify nursing when performing, and have guarding for tandem walking)   Physical Therapy Discharge Summary  Reason for discharge:   All goals and outcomes met, no further needs identified   Progress towards goals:   Goals met   Recommendation(s):   No further therapy is recommended.

## 2018-09-10 NOTE — PROGRESS NOTES
Neurosurgery Note    EVD removed. Tip intact. No complications. 3-0 monocryl stitch placed.    -----------------------------------  Luz Mary MD, MS  Neurosurgery PGY-2

## 2018-09-10 NOTE — PROGRESS NOTES
CLINICAL NUTRITION SERVICES - BRIEF NOTE      Reviewed nutrition risk factors due to LOS. Pt is tolerating a Regular diet, eating well per nursing documentation. No nutrition issues identified at this time. RD will sign off at this time, unless consulted.     Negrita Leach RD, LD, [facility]  Neuro ICU  Pager: [phone]

## 2018-09-11 ENCOUNTER — APPOINTMENT (OUTPATIENT)
Dept: MRI IMAGING | Facility: CLINIC | Age: 43
End: 2018-09-11
Attending: STUDENT IN AN ORGANIZED HEALTH CARE EDUCATION/TRAINING PROGRAM
Payer: COMMERCIAL

## 2018-09-11 ENCOUNTER — APPOINTMENT (OUTPATIENT)
Dept: OCCUPATIONAL THERAPY | Facility: CLINIC | Age: 43
End: 2018-09-11
Attending: NEUROLOGICAL SURGERY
Payer: COMMERCIAL

## 2018-09-11 LAB
ALBUMIN SERPL-MCNC: 2.9 G/DL (ref 3.4–5)
ALP SERPL-CCNC: 98 U/L (ref 40–150)
ALT SERPL W P-5'-P-CCNC: 28 U/L (ref 0–50)
ANION GAP SERPL CALCULATED.3IONS-SCNC: 11 MMOL/L (ref 3–14)
AST SERPL W P-5'-P-CCNC: 13 U/L (ref 0–45)
BACTERIA SPEC CULT: NO GROWTH
BILIRUB SERPL-MCNC: 1.3 MG/DL (ref 0.2–1.3)
BUN SERPL-MCNC: 8 MG/DL (ref 7–30)
CALCIUM SERPL-MCNC: 8.7 MG/DL (ref 8.5–10.1)
CHLORIDE SERPL-SCNC: 106 MMOL/L (ref 94–109)
CO2 SERPL-SCNC: 23 MMOL/L (ref 20–32)
CREAT SERPL-MCNC: 0.72 MG/DL (ref 0.52–1.04)
ERYTHROCYTE [DISTWIDTH] IN BLOOD BY AUTOMATED COUNT: 13.3 % (ref 10–15)
GFR SERPL CREATININE-BSD FRML MDRD: 88 ML/MIN/1.7M2
GLUCOSE BLDC GLUCOMTR-MCNC: 110 MG/DL (ref 70–99)
GLUCOSE BLDC GLUCOMTR-MCNC: 73 MG/DL (ref 70–99)
GLUCOSE BLDC GLUCOMTR-MCNC: 79 MG/DL (ref 70–99)
GLUCOSE BLDC GLUCOMTR-MCNC: 82 MG/DL (ref 70–99)
GLUCOSE SERPL-MCNC: 95 MG/DL (ref 70–99)
HCT VFR BLD AUTO: 35.2 % (ref 35–47)
HGB BLD-MCNC: 11.1 G/DL (ref 11.7–15.7)
MAGNESIUM SERPL-MCNC: 2.2 MG/DL (ref 1.6–2.3)
MCH RBC QN AUTO: 30.7 PG (ref 26.5–33)
MCHC RBC AUTO-ENTMCNC: 31.5 G/DL (ref 31.5–36.5)
MCV RBC AUTO: 98 FL (ref 78–100)
PHOSPHATE SERPL-MCNC: 3.7 MG/DL (ref 2.5–4.5)
PLATELET # BLD AUTO: 395 10E9/L (ref 150–450)
POTASSIUM SERPL-SCNC: 3.6 MMOL/L (ref 3.4–5.3)
PROT SERPL-MCNC: 6.9 G/DL (ref 6.8–8.8)
RBC # BLD AUTO: 3.61 10E12/L (ref 3.8–5.2)
SODIUM SERPL-SCNC: 139 MMOL/L (ref 133–144)
SPECIMEN SOURCE: NORMAL
WBC # BLD AUTO: 5.5 10E9/L (ref 4–11)

## 2018-09-11 PROCEDURE — 12000001 ZZH R&B MED SURG/OB UMMC

## 2018-09-11 PROCEDURE — 25000125 ZZHC RX 250: Performed by: STUDENT IN AN ORGANIZED HEALTH CARE EDUCATION/TRAINING PROGRAM

## 2018-09-11 PROCEDURE — 83735 ASSAY OF MAGNESIUM: CPT | Performed by: NEUROLOGICAL SURGERY

## 2018-09-11 PROCEDURE — 25500064 ZZH RX 255 OP 636: Performed by: NEUROLOGICAL SURGERY

## 2018-09-11 PROCEDURE — 85027 COMPLETE CBC AUTOMATED: CPT | Performed by: NEUROLOGICAL SURGERY

## 2018-09-11 PROCEDURE — 25000132 ZZH RX MED GY IP 250 OP 250 PS 637: Performed by: STUDENT IN AN ORGANIZED HEALTH CARE EDUCATION/TRAINING PROGRAM

## 2018-09-11 PROCEDURE — 40000133 ZZH STATISTIC OT WARD VISIT: Performed by: OCCUPATIONAL THERAPIST

## 2018-09-11 PROCEDURE — 70553 MRI BRAIN STEM W/O & W/DYE: CPT

## 2018-09-11 PROCEDURE — 97535 SELF CARE MNGMENT TRAINING: CPT | Mod: GO | Performed by: OCCUPATIONAL THERAPIST

## 2018-09-11 PROCEDURE — 36592 COLLECT BLOOD FROM PICC: CPT | Performed by: NEUROLOGICAL SURGERY

## 2018-09-11 PROCEDURE — 00000146 ZZHCL STATISTIC GLUCOSE BY METER IP

## 2018-09-11 PROCEDURE — 84100 ASSAY OF PHOSPHORUS: CPT | Performed by: NEUROLOGICAL SURGERY

## 2018-09-11 PROCEDURE — 25000128 H RX IP 250 OP 636: Performed by: NURSE PRACTITIONER

## 2018-09-11 PROCEDURE — 97530 THERAPEUTIC ACTIVITIES: CPT | Mod: GO | Performed by: OCCUPATIONAL THERAPIST

## 2018-09-11 PROCEDURE — A9585 GADOBUTROL INJECTION: HCPCS | Performed by: NEUROLOGICAL SURGERY

## 2018-09-11 PROCEDURE — 80053 COMPREHEN METABOLIC PANEL: CPT | Performed by: NEUROLOGICAL SURGERY

## 2018-09-11 RX ORDER — OXYCODONE HYDROCHLORIDE 5 MG/1
5 TABLET ORAL EVERY 4 HOURS PRN
Qty: 60 TABLET | Refills: 0 | Status: SHIPPED | OUTPATIENT
Start: 2018-09-11 | End: 2018-11-06

## 2018-09-11 RX ORDER — GADOBUTROL 604.72 MG/ML
10 INJECTION INTRAVENOUS ONCE
Status: COMPLETED | OUTPATIENT
Start: 2018-09-11 | End: 2018-09-11

## 2018-09-11 RX ORDER — POLYETHYLENE GLYCOL 3350 17 G/17G
17 POWDER, FOR SOLUTION ORAL 3 TIMES DAILY
Qty: 30 PACKET | Refills: 1 | Status: SHIPPED | OUTPATIENT
Start: 2018-09-11 | End: 2018-11-06

## 2018-09-11 RX ORDER — LEVETIRACETAM 1000 MG/1
1000 TABLET ORAL 2 TIMES DAILY
Qty: 60 TABLET | Refills: 1 | Status: SHIPPED | OUTPATIENT
Start: 2018-09-11

## 2018-09-11 RX ORDER — NAFCILLIN SODIUM 2 G/8ML
2 INJECTION, POWDER, FOR SOLUTION INTRAMUSCULAR; INTRAVENOUS EVERY 4 HOURS
Qty: 1440 ML | Refills: 1 | Status: SHIPPED | OUTPATIENT
Start: 2018-09-11 | End: 2018-10-15

## 2018-09-11 RX ORDER — RIFAMPIN 300 MG/1
300 CAPSULE ORAL EVERY 12 HOURS
Qty: 60 CAPSULE | Refills: 1 | Status: SHIPPED | OUTPATIENT
Start: 2018-09-11 | End: 2018-10-15

## 2018-09-11 RX ORDER — AMOXICILLIN 250 MG
2 CAPSULE ORAL 2 TIMES DAILY
Qty: 30 TABLET | Refills: 0 | Status: SHIPPED | OUTPATIENT
Start: 2018-09-11 | End: 2018-11-06

## 2018-09-11 RX ADMIN — Medication 250 MG: at 21:08

## 2018-09-11 RX ADMIN — LEVETIRACETAM 1000 MG: 500 TABLET ORAL at 21:08

## 2018-09-11 RX ADMIN — ACETAMINOPHEN 650 MG: 325 TABLET, FILM COATED ORAL at 06:10

## 2018-09-11 RX ADMIN — OXYCODONE HYDROCHLORIDE 5 MG: 5 TABLET ORAL at 04:45

## 2018-09-11 RX ADMIN — ACETAMINOPHEN 650 MG: 325 TABLET, FILM COATED ORAL at 11:02

## 2018-09-11 RX ADMIN — HEPARIN, PORCINE (PF) 10 UNIT/ML INTRAVENOUS SYRINGE 5 ML: at 17:49

## 2018-09-11 RX ADMIN — NAFCILLIN SODIUM 2 G: 2 INJECTION, POWDER, LYOPHILIZED, FOR SOLUTION INTRAMUSCULAR; INTRAVENOUS at 21:08

## 2018-09-11 RX ADMIN — NAFCILLIN SODIUM 2 G: 2 INJECTION, POWDER, LYOPHILIZED, FOR SOLUTION INTRAMUSCULAR; INTRAVENOUS at 08:29

## 2018-09-11 RX ADMIN — RIFAMPIN 300 MG: 300 CAPSULE ORAL at 21:08

## 2018-09-11 RX ADMIN — OXYCODONE HYDROCHLORIDE 5 MG: 5 TABLET ORAL at 13:09

## 2018-09-11 RX ADMIN — NAFCILLIN SODIUM 2 G: 2 INJECTION, POWDER, LYOPHILIZED, FOR SOLUTION INTRAMUSCULAR; INTRAVENOUS at 00:28

## 2018-09-11 RX ADMIN — GADOBUTROL 9 ML: 604.72 INJECTION INTRAVENOUS at 10:46

## 2018-09-11 RX ADMIN — NAFCILLIN SODIUM 2 G: 2 INJECTION, POWDER, LYOPHILIZED, FOR SOLUTION INTRAMUSCULAR; INTRAVENOUS at 12:25

## 2018-09-11 RX ADMIN — Medication 250 MG: at 08:32

## 2018-09-11 RX ADMIN — NAFCILLIN SODIUM 2 G: 2 INJECTION, POWDER, LYOPHILIZED, FOR SOLUTION INTRAMUSCULAR; INTRAVENOUS at 17:49

## 2018-09-11 RX ADMIN — FLUOXETINE 60 MG: 20 CAPSULE ORAL at 08:32

## 2018-09-11 RX ADMIN — LEVETIRACETAM 1000 MG: 500 TABLET ORAL at 08:31

## 2018-09-11 RX ADMIN — NAFCILLIN SODIUM 2 G: 2 INJECTION, POWDER, LYOPHILIZED, FOR SOLUTION INTRAMUSCULAR; INTRAVENOUS at 04:20

## 2018-09-11 RX ADMIN — LEVOTHYROXINE SODIUM 75 MCG: 75 TABLET ORAL at 08:32

## 2018-09-11 RX ADMIN — RIFAMPIN 300 MG: 300 CAPSULE ORAL at 13:09

## 2018-09-11 RX ADMIN — OXYCODONE HYDROCHLORIDE 5 MG: 5 TABLET ORAL at 00:52

## 2018-09-11 ASSESSMENT — ACTIVITIES OF DAILY LIVING (ADL)
ADLS_ACUITY_SCORE: 11

## 2018-09-11 ASSESSMENT — VISUAL ACUITY
OU: NORMAL ACUITY
OU: BASELINE;GLASSES
OU: NORMAL ACUITY

## 2018-09-11 NOTE — PROGRESS NOTES
Neurosurgery Progress Note    S: no major complaints    O:  Exam:  General: Awake;  Alert, In No Acute Distress  Pulm: Breathing Comfortably on room air  Mental status: Oriented x 3  Cranial Nerves: Cranial Nerves II-XII Intact Bilaterally with exception of left homonymous hemianopsia  Strength:      Del Tr Bi WE WF Gr  R 5 5 5 5 5 5  L 5 5 5 5 5 5     HF KE KF DF PF EHL  R 5 5 5 5 5 5  L 5 5 5 5 5 5    Pronator Drift: Absent  Sensory: Intact to Light Touch  INCISION: ventriculostomy site covered by primapore     Assessment:   #ventriculitis with associated cerebral edema due to ventriculoperitoneal shunt placement at outside hospital   #history of obstructive hydrocephalus due to cystic lesion     Plan by problem:     Neuro:   Anti-epileptics: continue prior to admission keppra  EVD: removed 9/10  Continue prior to admission antidepressant   See ID section for antibiotic treatment of intracranial infection  MRI brain today     Cardiovascular: no issues    Pulmonary: Incentive spirometry     Gastrointestinal: regular diet     Renal: monitor intake and output; daily BMP     Heme: daily CBC     Endocrine: continue prior to admission levothyroxine    Infectious ID consulted; appreciate recommendations. Nafcillin and rifampin for MSSA. F/u cultures. ESR and CRP every 72 hours    PT/OT: home with assist + outpatient PT and OT    DVT prophylaxis: Ambulate TID. Sequential compression devices; chemoprophylaxis held due to bleeding risk     Ulcer prophylaxis: none indicated    DISPO: home w/ assist    Barriers: none     -----------------------------------  Luz Mary MD, MS  Neurosurgery PGY-2

## 2018-09-11 NOTE — PROGRESS NOTES
Care Coordinator Progress Note    Admission Date/Time:  9/1/2018  Attending MD:  Dr Antonio Salinas    Data  Chart reviewed, discussed with interdisciplinary team. In 6A Discharge Rounds Mami Ash NP, reports plan is MRI today, then need to review plan with Dr Salinas.   This afternoon informed by Mami Ash NP, pt will need home IV Nafcillin.     Patient was admitted for:  Infection of ventricular shunt  On 8-30-18 pt fell down stairs at home; brought by EMS to [facility] in [place], [place]. In the [facility] it was noted pt had purulent drainage from scalp surgical wound. Admitted for Observation overnight at [facility] and discharged on 8-31 with plans to see her Neurosurgeon at [facility] in [place]. Seen at [facility] on 8-31, admitted overnight until [facility] could accept her in transfer. Transferred from [facility] in [place], [place] for further evaluation and treatment of possible shunt infection vs malfunction.     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     Procedure 9-02-18: Right-stealth-guided external ventricular drain placement; removal of pre-existing right-sided occipital ventriculoperitoneal shunt system.     Past history includes:  Depression; hypothyroidism; seizures.    Concerns with insurance coverage for discharge needs: None. Pt's insurance is [employer] [place].   Current Living Situation: Patient lives with family. Was living with mother prior to admission.   Support System: Supportive--mother  Services Involved: Home Infusion. Referral made to [facility].   Transportation at Discharge: Family or friend will provide, sister.   Transportation to Medical Appointments:   - Name of caregiver: Family  Barriers to Discharge: None    Coordination of Care and Referrals  Introduced myself to pt and explained I help with discharge planning. Pt alert, sitting up in bed eating lunch. Pt aware she will need IV antibiotics after discharge.  Explained I will help arrange home IVs. Pt has never done home IVs before. She did not have a preference for a home infusion provider. Informed her I will make referral to either [facility] or [facility]. We will start IV teaching before discharge and then home nurse will follow-up. Pt wondering if she can look at a You Tube video for instructions. (I think she was joking). Informed her the home infusion liaison will meet with her before discharge for teaching and a home care nurse.    Informed her Nafcillin is usually on a pump at home. Pt wasn't sure home care would be available in her area. Informed her arrangements could be made for her to go into an outpt center for lab draws and PICC line dressing changes.     Pt said she will be staying in [place], [place] or [place], [place]; hasn't decided yet. Will have help after discharge. Pt said she has family members who are nurses.    Confirmed pt's cell number on the facesheet.  Pt said her primary is Amna Benitez NP with [facility] in [place], [place]. She doesn't know pt has been hospitalized. Pt was OK with us updating Dr Benitez.   Pt said her sister will take off work and provide transport home.   During our conversation pt complained of the floor above moving furniture last night. I will follow-up. (I spoke with nurse, Jose M and asked him to contact 7A about noise from moving furniture).   Updated pt's nurses, Antonia & Jose M with discharge plan. Antonia said pt was concerned about infection and thought she would be OK with IVs.      Referral called to [facility] for home IV antibiotics. Entered order for PLC PICC line & IV med teaching.   Received page back from Esther at [facility], pt has 100% coverage for home IVs with Blue .     Called [facility] in [place] and obtained fax number for Amna Benitez NP. Phone: [phone]. Fax: [phone]     Assessment   Pt needing home IV antibiotics. Needs IV teaching.      Plan  Anticipated Discharge Date:  Tomorrow, pending home IV  antibiotic arrangements.   Anticipated Discharge Plan:   Discharge home on IV Nafcillin.   --Verify where pt will be staying after discharge, [facility] or [facility].   --[facility] for home IV antibiotics & supplies.   --[facility] for PICC line & IV med teaching.      Susy Ramirez RN Care Coordinator  Unit 6A, [facility]      [facility]  Phone:  [phone]      Amna Benitez NP (primary)  [place], [place]   Phone:  [phone]  Fax:  [phone]

## 2018-09-11 NOTE — PLAN OF CARE
Problem: Patient Care Overview  Goal: Discharge Needs Assessment  D/I: patient remains on 6A for neuro monitoring following removal of  shunt on 9/3  Neuro- L field cut to peripheral is slight, pt is safe ambulating  CV- WNL  Pulm- WNL  GI- tolerates regular diet with adequate amount of intake.   - voiding without difficulty, hydrating adequately  Skin- incisions to scalp intact with well approximated suturing  Gtts- PIV SL'd, PICC SL'd between abx  ID- n/a  Labs- WNL  Pain- took oxycodone and tylenol for HA today, regimen effectively manages HA  Activity- up independently in room  Drains- n/a  Social- n/a  CRRT- n/a  See flow sheets for further details and assessments.  A: pt ready to DC to home tomorrow if ride available, pt working on transportation currently from family. PLC at 0900 tomorrow for PICC teaching.  P: continue to monitor, notify MD of significant changes.

## 2018-09-11 NOTE — PLAN OF CARE
Problem: Patient Care Overview  Goal: Plan of Care/Patient Progress Review  OT/6A    Discharge Planner OT   Patient plan for discharge: Home setting (Describes complicated social/ living environment, reports cant go home due to restraining order against 'future ex-boyfriend,' possibly could discharge to mother's home (lives in far [facility]), also reports 'local park' as an option  Current status: To challenge navigation with left visual field cut, Pt taken to novel, busy environment (gift shop), to practice navigating tight spaces utilizing left visual scanning techniques, good attention to scanning with cues, and able to return to unit/room with SBA for pathfinding task. Pt hit objects x2 on L side when attention divided. Educated on crani precautions with LB dressing/bathing tasks.   Barriers to return to prior living situation: Medical readiness   Recommendations for discharge: Safe home environment with assist, OP OT/vision therapy  Rationale for recommendations: Question insight and safety with complex tasks therefore recommending assist, continued OP vision therapy to address Left visual field cut impacting indep with ADLs/IADLs. Recommend no driving until further assessment completed in OP setting.        Entered by: Corrine Mohan 09/11/2018 11:53 AM

## 2018-09-11 NOTE — PLAN OF CARE
Problem: Pain, Acute (Adult)  Goal: Identify Related Risk Factors and Signs and Symptoms  Related risk factors and signs and symptoms are identified upon initiation of [facility] Clinical Practice Guideline (CPG).   Outcome: No Change  Hx of intracranial arachnoid cyst s/p  shunt (7/18), followed by a fall on 8/31 and increased confusion.   Status: Worsening HA and ventriculitis, edema d/t  shunt at OSH with + staph cultures to CSF.  shunt removed and EVD placed; EVD removed at 1000 today prior to transfer to floor.     VS: Stable  Neuros: A&O x4; L visual field cut  GI: Regular diet; fair intake. Denies N/V  : Voiding spontaneously  IV: DL PICC at TKO between antibiotics, PIV SL  Activity: Up with SBA  Pain: Pt c/o HA controlled with PRN oxycodone and tylenol  Respiratory: LS clear with diminished bases   Skin: R head incision and previous EVD site sutured, MYRNA CDI  Social: No family with patient on transfer; pt calm and cooperative this shift.   Plan of care: Continue to monitor and follow current POC.

## 2018-09-11 NOTE — PLAN OF CARE
Problem: Patient Care Overview  Goal: Plan of Care/Patient Progress Review  Outcome: No Change  0700 - 1300    Status: Pt admitted for worsening HA, ventriculitis, and edema from  shunt infection, staph cultures +.  shunt removed on the 9/2. EVD removed 9/10/18.   VS: VSS on RA  Neuros: AO4, strength 5/5 in all extremities. L visual field cut. Pts mood is liable. Cooperative during cares.   GI: Tolerating regular diet well. Reports loose stools this AM.   : Voiding spontaneously  IV: DL PICC TKO in between antibiotics. PIV SL  Activity: Pt up in room independently. GB used while ambulating the unit with PT  Pain: PRN Tylenol given for pain x 1. Pt has been using cold packs for HA  Skin: R crani incision is MYRNA  Plan of care: Pt likely DC tomorrow home. Pt will need IV antibiotics at home. MRI completed this AM. Pt reports missing (permanent?) partial teeth from surgery. Neurosurgery notified. Continue to monitor.

## 2018-09-11 NOTE — PLAN OF CARE
Problem: Patient Care Overview  Goal: Plan of Care/Patient Progress Review  Outcome: Improving  D/I: Patient on unit 4A Surgical/Neuro ICU   Neuro-  EVD removed this morning; Pt able to use call-light appropriately; Unchanged/intact neuro except left vision field cut/neglect-Pt denies numbness/tingling; Pt walks within room-STA to ensure safety;   CV-  Sinus Rhythm; SBP goals achieved with no pharmacological intervention; Potassium replaced per protocol this AM  Pulm-  Clear to diminished lung sounds on RA  GI-  Regular diet-poor appetite; Pt refused bowel meds d/t loose stool-BM x2 during shift today  -  Voids spontaneously-adequate output  Pain-  PRN Oxycodone x1 and PRN Tylenol x1-relief noted  See flow sheets for further interventions and assessments.   A: Stable   P: Continue to monitor Pt closely. Notify MD of significant changes. Pt transferred to  at roughtly 1920. Report given to KWAKU Almeida.

## 2018-09-11 NOTE — PROGRESS NOTES
Care Coordinator Progress Note    Admission Date/Time:  9/1/2018  Attending MD:  Dr Antonio Salinas    Data  Pt's nurse, Jose M, reported pt's daughter was calling. Family needs 24 hour notice to  pt; they need to take off work.     Coordination of Care & Referrals  Spoke with daughter, Roxanne on the phone. Roxanne explained pt's sisters are at work now and need to provide 24 hour notice to take off work. Earliest they can  pt is on Thursday, 9-13. Roxanne is on her way to meet with pt's sisters and discuss plan. Roxanne said she is in school and has a test so she can't pick pt up.   Explained to Roxanne plan is discharge on IV antibiotics; has a PICC line. Pt will need to follow-up with Infectious Disease. Home infusion will provide teaching and arrange for home care nurse. I anticipate with medication will be on a pump; change once a day. Roxanne asked if pt could see Infectious Disease in [place] or [place] since transportation is an issue. I will ask.  Roxanne said pt will discharge to her mother's home in [place], [place]. Pt will not be going home to [place], [place]. There is a restraining order, no contact order, against pt's boyfriend.   Informed Roxanne pt has [employer] [place]. I can call and see if pt has transport benefits for a ride at least part way to [place], [place]. I suggested I try & arrange ride to [place] and then family could pick [place] up there. In the meantime, Roxanne will speak with pt's sisters. Gave Roxanne my phone number. Requested she also obtain pt's mother's home address in [place], [place].    Called Adam Mcpherson at [phone]; pt does have transportation benefits. Ride is pending; I will call them back with a destination address. Called Roxanne and informed her pt does have transport benefits.     Called [facility] and notified them pt will discharge to [place], [place]. Will get specific address from family.   Spoke with Clare Carrera RN [facility] Liaison. Updated her on discharge plan. Pt will discharge to [place], [place].  Discharge time to be determined. Pt has PLC at 9am tomorrow. Clare said [facility] will plan on delivering home supplies to pt's room tomorrow. [facility] will hook pt up to home pump before discharge.     4:50pm:  Received call from Roxanne, pt's daughter. She was with pt's sisters. They were concerned pt would ask transport to take her to [place], [place] instead of [place]. Informed Roxanne I think transport has to go to the destination we provide, however, pt is an adult and if she wants to get out of the cab I don't think they can stop her.   After discussion pt's sister, Erin will pick pt up at [facility] in [place], [place]. It is in the [employer] [place]. Confirmed address with Roxanne, [address], [place], [place]. Erin's phone: [phone]. I anticipate pt will be able to discharge early tomorrow afternoon. Erin can be at the [employer] at 3pm. I will call her with the specific time pt's leaves tomorrow.      5:30pm:  Updated pt on discharge plan for tomorrow. PLC at 9am; [facility] Liaison to connect to home pump; [facility] to deliver IV supplies to her room. Discharge early afternoon by i provided by [employer].   Pt said her mother has a doctor's appt on Thursday, 9-13 in [place]. Pt said she may be able to get her pump changed there. Informed pt we will speak with [facility] tomorrow, they are looking for home care in [place].     Transportation at Discharge: [place] transportation,  "[employer]" Ride [phone]     Assessment  Pt needing a ride home. Family concerned pt will try to go home to [place], [place], where her old boyfriend is.      Plan  Anticipated Discharge Date:  9-12-18  Anticipated Discharge Plan:   Discharge to mother's home in [place], [place].    --Need pt's mother's home address in [place], [place], where pt will stay after discharge.   --PLC at 9am tomorrow for IV teaching.     --[facility] will plan on delivering home IV supplies to pt's room before discharge for her to take home. [facility] will connect pt to home pump before discharge.      --Pt's sister, Erin, will meet pt at [facility] [employer] (in the [facility]) around 3pm tomorrow. Address: [address] [place] [address], [place] [address].     --I will call Erin tomorrow when I know pt's specific discharge time.       Susy Ramirez, RN Care Coordinator  Unit 6A, [facility]

## 2018-09-11 NOTE — DISCHARGE SUMMARY
[facility] Discharge Summary and Instructions    Bandar Olmedo MRN# [id number]   Age: 43 year old YOB: 1975     Date of Admission:  9/1/2018  Date of Discharge::  9/12/2018  2:17 PM  Admitting Physician:  Antonio Salinas MD  Discharge Physician:  Antonio Salinas MD          Admission Diagnoses:   Hydrocephalus [G91.9]  Infection of  (ventriculoperitoneal) shunt, initial encounter (H) [T85.730A]          Discharge Diagnosis:   Hydrocephalus [G91.9]  Infection of  (ventriculoperitoneal) shunt, initial encounter (H) [T85.730A]          Procedures:   9/2/2018  1. Right-sided Stealth-guided external ventricular drain placement   2. Removal of preexisting right-sided occipital ventriculoperitoneal shunt system.            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  She sustained a fall recently and following the fall had worsening headaches and tenderness over the shunt site, and allegedly a first time seizure.  At the outside hospital emergency room, the records indicate there was some purulent discharge from her surgical site.  At the time, she was afebrile with a white count of 13.  She was then transferred to the [facility].  Her shunt was tapped and no free flow of CSF was appreciated.  The decision was made to remove the patient's shunt, send for cultures given the possible history of purulence in the site, and place an external ventricular drain.  The patient was apprised of the plan and was in agreement.            Hospital Course:   Following surgery the patient was monitored closely in the ICU.  EVD placed at time of surgery and was slowly weaned during her admission.  Patient was able to tolerate weaning of the EVD and  this was ultimately removed on 9/10/2018.  Patient remained neurologically intact following removal.    Serial CSF samples were collected and analyzed, CSF grew Staph Aureues, cultures negative since 9/3/2018.    Patient noted to be first post operative seizure on 7/12/2018, remains on Keppra 750 mg BID and will continue this upon discharge until re-evaluated with neurology.  Patient will need to follow up with Neurology either at [facility] or close to her home to discuss if this medication needs to be continued or if she should be weaned from the medication.   Prior to discharge, MRI brain was obtained and compared to MRI on 9/2/2018 and revealed improvement of posterior right fluid collection.    PT and OT evaluated patient and felt she was safe to discharge home with outpatient therapies.     Patient will need to follow up with Dr Antonio Salinas in 4 weeks from discharge with MRI brain with/without contrast.   Patient will follow up with Dr Willoughby on 9/26/2018.  Patient will need weekly CBC, CMP, CRP serum labs drawn with results faxed to Dr Willoughby @ [phone].  Patient will discharge on oral Rifampin and IV Nafcillin.  Will likely need treatment for 4-6 weeks.    Due to vision deficits patient will need outpatient occupational therapy vision therapy and driving assessment.  Outpatient physical therapy is also recommended.   Prior to discharge patient was medically and neurologically intact, tolerating diet, passing bowel movements, voiding, and pain was controlled.            Discharge Medications:     Current Discharge Medication List      START taking these medications    Details   nafcillin 2 GM vial Inject 2 g into the vein every 4 hours  Qty: 1440 mL, Refills: 1    Associated Diagnoses: Infection of ventricular shunt, initial encounter (H)      oxyCODONE IR (ROXICODONE) 5 MG tablet Take 1 tablet (5 mg) by mouth every 4 hours as needed for other (pain control or improvement in physical function.  "Hold dose for analgesic side effects.)  Qty: 60 tablet, Refills: 0    Associated Diagnoses: Infection of ventricular shunt, initial encounter (H)      polyethylene glycol (MIRALAX/GLYCOLAX) Packet Take 17 g by mouth 3 times daily  Qty: 30 packet, Refills: 1    Associated Diagnoses: Infection of ventricular shunt, initial encounter (H)      rifampin (RIFADIN) 300 MG capsule Take 1 capsule (300 mg) by mouth every 12 hours  Qty: 60 capsule, Refills: 1    Associated Diagnoses: Infection of ventricular shunt, initial encounter (H)      senna-docusate (SENOKOT-S;PERICOLACE) 8.6-50 MG per tablet Take 2 tablets by mouth 2 times daily  Qty: 30 tablet, Refills: 0    Associated Diagnoses: Infection of ventricular shunt, initial encounter (H)         CONTINUE these medications which have CHANGED    Details   levETIRAcetam 1000 MG TABS Take 1,000 mg by mouth 2 times daily  Qty: 60 tablet, Refills: 1    Associated Diagnoses: Infection of ventricular shunt, initial encounter (H)         CONTINUE these medications which have NOT CHANGED    Details   Acetaminophen (TYLENOL PO) Take 500 mg by mouth every 6 hours as needed for mild pain or fever      BuPROPion HCl (WELLBUTRIN SR PO) Take 200 mg by mouth daily      ClonazePAM (KLONOPIN PO) Take 1 mg by mouth 3 times daily as needed for anxiety      Diclofenac Sodium (VOLTAREN PO) Take 50 mg by mouth 3 times daily as needed for moderate pain      FLUoxetine HCl (PROZAC PO) Take 60 mg by mouth daily      Levothyroxine Sodium (SYNTHROID PO) Take 75 mcg by mouth daily      norgestimate-ethinyl estradiol (ORTHO-CYCLEN, SPRINTEC) 0.25-35 MG-MCG per tablet Take 1 tablet by mouth daily      Zolpidem Tartrate (AMBIEN PO) Take 10 mg by mouth nightly as needed for sleep            /79 (BP Location: Right arm)  Pulse 75  Temp 96.7  F (35.9  C) (Oral)  Resp 16  Ht 1.803 m (5' 11\")  Wt 86.3 kg (190 lb 4.1 oz)  SpO2 96%  BMI 26.54 kg/m2    O:  Exam:  General: Awake;  Alert, In No Acute " Distress  Pulm: Breathing Comfortably on room air  Mental status: Oriented x 3  Cranial Nerves: Cranial Nerves II-XII Intact Bilaterally with exception of left homonymous hemianopsia  Strength:                         Del                 Tr                   Bi                   WE                WF                 Gr  R                    5                    5                    5                    5                    5                    5  L                    5                    5                    5                    5                    5                    5                        HF                 KE                 KF                  DF                 PF                  EHL  R                    5                    5                    5                    5                    5                    5  L                    5                    5                    5                    5                    5                    5     Pronator Drift: Absent  Sensory: Intact to Light Touch  INCISION: ventriculostomy site covered by primapore        Discharge Instructions and Follow-Up:   Discharge diet: Regular   Discharge activity: You may advance activity as tolerated. No strenuous exercise or heay lifting greater than 10 lbs for 4 weeks or until seen and cleared in clinic.   Discharge follow-up: Follow-up with Dr. Antonio Salinas MD in 4 weeks with MRI brain with/without contrast    Follow-up with Dr. Willoughby on 9/26/2018.  Will need weekly CMP, CBC, and CRP     Follow-up with Neurology to discuss Keppra and if this medication needs to be continued     Will need to follow up with Occupational therapy as an outpatient for driving assessment and vision therapy.  Will also need outpatient physical therapy.     Please follow-up with PCP for suture removal on 9/17/2018     Wound care: Ok to shower,however no scrubbing of the wound and no soaking of the wound, meaning no bathtubs or swimming  pools. Pat dry only. Leave wound open to air.  Sutures are not absorbable and need to be removed in 2 weeks. If patient still at rehab by this time, the sutures may be removed by the rehab physician if he or she considers that the wound has healed completely.     Please call if you have:  1. increased pain, redness, drainage, swelling at your incision  2. fevers > 101.5 F degrees  3. with any questions or concerns.  You may reach the Neurosurgery clinic at [phone] during regular work hours. ER at [phone].    and ask for the Neurosurgery Resident on call at [phone], during off hours or weekends.         Discharge Disposition:   Discharged to home          SAMEERA Gallego, CNP  Department of Neurosurgery  Pager: [phone]

## 2018-09-12 ENCOUNTER — APPOINTMENT (OUTPATIENT)
Dept: OCCUPATIONAL THERAPY | Facility: CLINIC | Age: 43
End: 2018-09-12
Attending: NEUROLOGICAL SURGERY
Payer: COMMERCIAL

## 2018-09-12 ENCOUNTER — HOME INFUSION (PRE-WILLOW HOME INFUSION) (OUTPATIENT)
Dept: PHARMACY | Facility: CLINIC | Age: 43
End: 2018-09-12

## 2018-09-12 ENCOUNTER — PATIENT OUTREACH (OUTPATIENT)
Dept: CARE COORDINATION | Facility: CLINIC | Age: 43
End: 2018-09-12

## 2018-09-12 ENCOUNTER — TELEPHONE (OUTPATIENT)
Dept: INFECTIOUS DISEASES | Facility: CLINIC | Age: 43
End: 2018-09-12

## 2018-09-12 VITALS
BODY MASS INDEX: 26.64 KG/M2 | WEIGHT: 190.26 LBS | SYSTOLIC BLOOD PRESSURE: 135 MMHG | HEIGHT: 71 IN | OXYGEN SATURATION: 98 % | DIASTOLIC BLOOD PRESSURE: 83 MMHG | TEMPERATURE: 97.9 F | RESPIRATION RATE: 16 BRPM | HEART RATE: 79 BPM

## 2018-09-12 LAB
ALBUMIN SERPL-MCNC: 3 G/DL (ref 3.4–5)
ALP SERPL-CCNC: 102 U/L (ref 40–150)
ALT SERPL W P-5'-P-CCNC: 24 U/L (ref 0–50)
ANION GAP SERPL CALCULATED.3IONS-SCNC: 7 MMOL/L (ref 3–14)
AST SERPL W P-5'-P-CCNC: 8 U/L (ref 0–45)
BACTERIA SPEC CULT: NO GROWTH
BILIRUB SERPL-MCNC: 0.5 MG/DL (ref 0.2–1.3)
BUN SERPL-MCNC: 8 MG/DL (ref 7–30)
CALCIUM SERPL-MCNC: 8.8 MG/DL (ref 8.5–10.1)
CHLORIDE SERPL-SCNC: 105 MMOL/L (ref 94–109)
CO2 SERPL-SCNC: 26 MMOL/L (ref 20–32)
CREAT SERPL-MCNC: 0.62 MG/DL (ref 0.52–1.04)
ERYTHROCYTE [DISTWIDTH] IN BLOOD BY AUTOMATED COUNT: 13.4 % (ref 10–15)
GFR SERPL CREATININE-BSD FRML MDRD: >90 ML/MIN/1.7M2
GLUCOSE SERPL-MCNC: 85 MG/DL (ref 70–99)
HCT VFR BLD AUTO: 35.8 % (ref 35–47)
HGB BLD-MCNC: 11.5 G/DL (ref 11.7–15.7)
Lab: NORMAL
MAGNESIUM SERPL-MCNC: 2.2 MG/DL (ref 1.6–2.3)
MCH RBC QN AUTO: 31.1 PG (ref 26.5–33)
MCHC RBC AUTO-ENTMCNC: 32.1 G/DL (ref 31.5–36.5)
MCV RBC AUTO: 97 FL (ref 78–100)
PHOSPHATE SERPL-MCNC: 3.8 MG/DL (ref 2.5–4.5)
PLATELET # BLD AUTO: 401 10E9/L (ref 150–450)
POTASSIUM SERPL-SCNC: 3.8 MMOL/L (ref 3.4–5.3)
PROT SERPL-MCNC: 7 G/DL (ref 6.8–8.8)
RBC # BLD AUTO: 3.7 10E12/L (ref 3.8–5.2)
SODIUM SERPL-SCNC: 138 MMOL/L (ref 133–144)
SPECIMEN SOURCE: NORMAL
WBC # BLD AUTO: 6 10E9/L (ref 4–11)

## 2018-09-12 PROCEDURE — 40000802 ZZH SITE CHECK

## 2018-09-12 PROCEDURE — 40000133 ZZH STATISTIC OT WARD VISIT

## 2018-09-12 PROCEDURE — 83735 ASSAY OF MAGNESIUM: CPT | Performed by: NEUROLOGICAL SURGERY

## 2018-09-12 PROCEDURE — 85027 COMPLETE CBC AUTOMATED: CPT | Performed by: NEUROLOGICAL SURGERY

## 2018-09-12 PROCEDURE — 80053 COMPREHEN METABOLIC PANEL: CPT | Performed by: NEUROLOGICAL SURGERY

## 2018-09-12 PROCEDURE — 25000132 ZZH RX MED GY IP 250 OP 250 PS 637: Performed by: STUDENT IN AN ORGANIZED HEALTH CARE EDUCATION/TRAINING PROGRAM

## 2018-09-12 PROCEDURE — 25000128 H RX IP 250 OP 636: Performed by: NURSE PRACTITIONER

## 2018-09-12 PROCEDURE — 25000125 ZZHC RX 250: Performed by: STUDENT IN AN ORGANIZED HEALTH CARE EDUCATION/TRAINING PROGRAM

## 2018-09-12 PROCEDURE — 36592 COLLECT BLOOD FROM PICC: CPT | Performed by: NEUROLOGICAL SURGERY

## 2018-09-12 PROCEDURE — 97535 SELF CARE MNGMENT TRAINING: CPT | Mod: GO

## 2018-09-12 PROCEDURE — 84100 ASSAY OF PHOSPHORUS: CPT | Performed by: NEUROLOGICAL SURGERY

## 2018-09-12 RX ADMIN — Medication 250 MG: at 08:22

## 2018-09-12 RX ADMIN — OXYCODONE HYDROCHLORIDE 5 MG: 5 TABLET ORAL at 12:49

## 2018-09-12 RX ADMIN — OXYCODONE HYDROCHLORIDE 5 MG: 5 TABLET ORAL at 08:21

## 2018-09-12 RX ADMIN — ACETAMINOPHEN 650 MG: 325 TABLET, FILM COATED ORAL at 12:49

## 2018-09-12 RX ADMIN — FLUOXETINE 60 MG: 20 CAPSULE ORAL at 08:22

## 2018-09-12 RX ADMIN — HEPARIN, PORCINE (PF) 10 UNIT/ML INTRAVENOUS SYRINGE 5 ML: at 06:21

## 2018-09-12 RX ADMIN — NAFCILLIN SODIUM 2 G: 2 INJECTION, POWDER, LYOPHILIZED, FOR SOLUTION INTRAMUSCULAR; INTRAVENOUS at 00:44

## 2018-09-12 RX ADMIN — LEVETIRACETAM 1000 MG: 500 TABLET ORAL at 08:21

## 2018-09-12 RX ADMIN — NAFCILLIN SODIUM 2 G: 2 INJECTION, POWDER, LYOPHILIZED, FOR SOLUTION INTRAMUSCULAR; INTRAVENOUS at 03:55

## 2018-09-12 RX ADMIN — ACETAMINOPHEN 650 MG: 325 TABLET, FILM COATED ORAL at 01:05

## 2018-09-12 RX ADMIN — LEVOTHYROXINE SODIUM 75 MCG: 75 TABLET ORAL at 08:22

## 2018-09-12 RX ADMIN — NAFCILLIN SODIUM 2 G: 2 INJECTION, POWDER, LYOPHILIZED, FOR SOLUTION INTRAMUSCULAR; INTRAVENOUS at 12:49

## 2018-09-12 RX ADMIN — NAFCILLIN SODIUM 2 G: 2 INJECTION, POWDER, LYOPHILIZED, FOR SOLUTION INTRAMUSCULAR; INTRAVENOUS at 08:22

## 2018-09-12 RX ADMIN — RIFAMPIN 300 MG: 300 CAPSULE ORAL at 12:49

## 2018-09-12 ASSESSMENT — PAIN DESCRIPTION - DESCRIPTORS: DESCRIPTORS: HEADACHE

## 2018-09-12 ASSESSMENT — VISUAL ACUITY
OU: BASELINE
OU: BASELINE
OU: BASELINE;GLASSES;BLURRED VISION

## 2018-09-12 ASSESSMENT — ACTIVITIES OF DAILY LIVING (ADL)
ADLS_ACUITY_SCORE: 11

## 2018-09-12 NOTE — PLAN OF CARE
"Problem: Patient Care Overview  Goal: Plan of Care/Patient Progress Review  Outcome: Improving  /64 (BP Location: Right arm)  Pulse 65  Temp 98.2  F (36.8  C) (Oral)  Resp 16  Ht 1.803 m (5' 11\")  Wt 86.3 kg (190 lb 4.1 oz)  SpO2 99%  BMI 26.54 kg/m2    Status: pt s/p  shunt removal on 9/3 d/t infection.   Neuros: oriented x4.  Left field cut.  GI: Regular diet  : voiding spontaneously.  IV: PICC DL; purple lumen HL and white lumen SL between abx. PIV SL  Activity: up ad heide  Pain: intermittent neck pain.  Tylenol and ice packs given.  Respiratory: WDL  Skin: R anterior head incision CDI; closed with sutures  Plan of care: plan is to discharge to mother today.    Michael Aquino RN on 9/12/2018 at 5:03 AM         "

## 2018-09-12 NOTE — PLAN OF CARE
Problem: Patient Care Overview  Goal: Plan of Care/Patient Progress Review  Occupational Therapy Discharge Summary    Reason for therapy discharge:    Discharged to home with outpatient therapy.    Progress towards therapy goal(s). See goals on Care Plan in [facility] health record for goal details.  Goals met    Therapy recommendation(s):    Continued therapy is recommended.  Rationale/Recommendations:  OT for vision/higher level cognition/driving.      Problem: OT General Care Plan  Goal: Home Management (OT)  Home Management (OT)   Outcome: Adequate for Discharge Date Met: 09/12/18  Supervision recommended for complex IADL, lifting restriction for heavy IADL

## 2018-09-12 NOTE — PROGRESS NOTES
Therapy: IV ABX  Insurance: [employer]   Ded: $  Met: $    Co-Insurance: 100  Max Out of Pocket: $  Met: $    Please contact Intake with any questions, [phone] or In Basket pool, [facility] ([id number]).  In reference to admission date 9/1/18 to check IV ABX coverage

## 2018-09-12 NOTE — PLAN OF CARE
Problem: Patient Care Overview  Goal: Plan of Care/Patient Progress Review  OT 6A:  Discharge Planner OT   Patient plan for discharge: to mother's house in [place]  Current status: Pt able to complete basic ADL independently within her precautions.  She is able to navigate her familiar hospital room safely and independently.  She has L visual field cut and has been educated on using compensatory strategies to correct this, however needs oversight for this in novel environments.    Barriers to return to prior living situation: L visual field cut, higher level cognition/executive function?, crani precautions  Recommendations for discharge: home to mother's house with oversight/assist for complex tasks as well as OP OT for vision, driving/higher level cognition  Rationale for recommendations: Concern for safety awareness with high-level tasks, so recommend oversight/assist for complex IADL.  Recommend pt not return to driving until she has had OP driving assessment       Entered by: Gabbie Giraldo 09/12/2018 11:34 AM

## 2018-09-12 NOTE — PLAN OF CARE
Bandar was seen alone in the [facility] for PICC/IVmed class. She was able to a heparin flush and use the CADD pump for her nafcillin but needed reminders to do the steps correctly. She admitted she has some short term memeory issues and will need to use the written information to ensure she does the steps correctly. She will benefit from further education and having a caregiver to assist will also be beneficial if possible.

## 2018-09-12 NOTE — PLAN OF CARE
Problem: Patient Care Overview  Goal: Plan of Care/Patient Progress Review  Outcome: No Change  [id number]:   Status: pt s/p  shunt removal on 9/3 s/t infection.   VS:AVSS  Neuros: intact except for baseline blurred vision (wears glasses), L field cut  GI: Regular diet  : voiding   IV: PICC DL; purple lumen HL and white lumen SL between abx. PIV SL  Activity: up ad heide  Pain:denies  Respiratory/Trach: LS clear  Skin: R anterior crani incision CDI; closed with sutures  Plan of care: plan to DC to mothers home tomorrow.

## 2018-09-12 NOTE — PROGRESS NOTES
Neurosurgery Progress Note    S: no major complaints    O:  Exam:  General: Awake;  Alert, In No Acute Distress  Pulm: Breathing Comfortably on room air  Mental status: Oriented x 3  Cranial Nerves: Cranial Nerves II-XII Intact Bilaterally with exception of left homonymous hemianopsia  Strength:      Del Tr Bi WE WF Gr  R 5 5 5 5 5 5  L 5 5 5 5 5 5     HF KE KF DF PF EHL  R 5 5 5 5 5 5  L 5 5 5 5 5 5    Pronator Drift: Absent  Sensory: Intact to Light Touch  INCISION: ventriculostomy site covered by primapore     Assessment:   #ventriculitis with associated cerebral edema due to ventriculoperitoneal shunt placement at outside hospital   #history of obstructive hydrocephalus due to cystic lesion     Plan by problem:     Neuro:   Anti-epileptics: continue prior to admission keppra  EVD: removed 9/10  Continue prior to admission antidepressant   See ID section for antibiotic treatment of intracranial infection  MRI brain obtained and reviewed    Cardiovascular: no issues    Pulmonary: Incentive spirometry     Gastrointestinal: regular diet     Renal: monitor intake and output; daily BMP     Heme: daily CBC     Endocrine: continue prior to admission levothyroxine    Infectious ID consulted; appreciate recommendations. Nafcillin and rifampin for MSSA for 6 weeks total, follow-up with ID on 9/26/18.    PT/OT: home with assist + outpatient PT and OT    DVT prophylaxis: Ambulate TID. Sequential compression devices; chemoprophylaxis held due to bleeding risk     Ulcer prophylaxis: none indicated    DISPO: home w/ assist    Barriers: none       -----------------------------------  Luz Mary MD, MS  Neurosurgery PGY-2

## 2018-09-12 NOTE — PLAN OF CARE
Problem: Patient Care Overview  Goal: Plan of Care/Patient Progress Review  Outcome: No Change  Pt had PICC PLC this morning. Met w/ home infusion nurse this afternoon. Discharge summary reviewed w/ pt. No questions at this time. PICC set up for 4pm dose of nifampin. Pt left unit at 2pm w/ NST. Taxi transporting pt to [place], where family member was going to meet pt to take her back to [place].

## 2018-09-13 ENCOUNTER — HOME INFUSION (PRE-WILLOW HOME INFUSION) (OUTPATIENT)
Dept: PHARMACY | Facility: CLINIC | Age: 43
End: 2018-09-13

## 2018-09-13 LAB
BACTERIA SPEC CULT: NO GROWTH
SPECIMEN SOURCE: NORMAL

## 2018-09-13 NOTE — PROGRESS NOTES
This is a recent snapshot of the patient's [facility] medical record.  For current drug dose and complete information and questions, call [phone] or In Basket pool, [facility] ([id number])  CSN Number:  [id number]

## 2018-09-13 NOTE — PROGRESS NOTES
Addendum 9/12/18  1300  Returned to pt's room once supplies arrived.  Provided hook up of home medication after reviewing pump settings and verifying with orders.    Reminded Bandar about  ongoing supply deliveries, to refrigerate her other bags of nafcillin as soon as she gets home; informed her about plan for SNV and 24/7 availability of I staff.    Provided her with both [facility] and [facility] phone #s. Her home abx is running and she is ready for discharge from [facility] perspective.    Clare Carrera RN [facility] Liaison  [phone] pager  Home Infusion  Bandar is discharging  today and will be going home on IV nafcillin q 4 hrs.   She has never done home IV therapy before but has been to the [facility] for initial teaching.  Per the [facility] nurse Bandar had some difficulties with short term memory and recall of instructions.  Met with Bandar at bedside to provide her with information about [facility] services and assess her ability to be able to manage her IV therapy.  I brought equipment with me and reviewed administration process, use of pump and flushing of catheter. While Bandar seems very bright and understood all concepts of the infusion process she again had issues with recall and staying focused on the process.  She made multiple mistakes in the process and sterile technique and at this time is not safe to be doing it herself.   She does have CGs who can help her but they can not be here for teaching.  Plan will be for me to hook up her 24 hr bag of nafcillin (pump will administer q 4 hrs) and home nursing agency ([facility]) will see her tomorrow morning to teach CG how to flush catheter and change the med bag daily.   Informed Bandar about supplies and delivery of supplies, storage of medication, dosing times, plan for SNV and 24/7 availability of I staff while on IV therapy.  Bandar verbalized understanding of information given and is agreeable with plan.  She understands her limitations with  her memory and lack of concentration and willing to let CGs do her daily bag changes until/if she is able to do it safely herself.  Will return once drug and supplies are delivered to hook up her home nafcillin.  Clare Carrera RN FRANCESCA  [facility] Liaison  [phone] pager

## 2018-09-14 ENCOUNTER — HOME INFUSION (PRE-WILLOW HOME INFUSION) (OUTPATIENT)
Dept: PHARMACY | Facility: CLINIC | Age: 43
End: 2018-09-14

## 2018-09-14 LAB
BACTERIA SPEC CULT: NO GROWTH
Lab: NORMAL
SPECIMEN SOURCE: NORMAL

## 2018-09-14 NOTE — PROGRESS NOTES
This is a recent snapshot of the patient's [facility] medical record.  For current drug dose and complete information and questions, call [phone] or In [facility] pool, [facility] ([id number])  CSN Number:  [id number]

## 2018-09-15 LAB
BACTERIA SPEC CULT: NO GROWTH
SPECIMEN SOURCE: NORMAL

## 2018-09-16 LAB
BACTERIA SPEC CULT: NORMAL
Lab: NORMAL
SPECIMEN SOURCE: NORMAL

## 2018-09-17 ENCOUNTER — TRANSFERRED RECORDS (OUTPATIENT)
Dept: HEALTH INFORMATION MANAGEMENT | Facility: CLINIC | Age: 43
End: 2018-09-17

## 2018-09-17 LAB
BACTERIA SPEC CULT: NORMAL
Lab: NORMAL
SPECIMEN SOURCE: NORMAL

## 2018-09-17 NOTE — PROGRESS NOTES
This is a recent snapshot of the patient's [facility] medical record.  For current drug dose and complete information and questions, call [phone] or In [facility] pool, [facility] ([id number])  CSN Number:  [id number]

## 2018-09-18 ENCOUNTER — HOME INFUSION (PRE-WILLOW HOME INFUSION) (OUTPATIENT)
Dept: PHARMACY | Facility: CLINIC | Age: 43
End: 2018-09-18

## 2018-09-18 ENCOUNTER — TELEPHONE (OUTPATIENT)
Dept: NEUROSURGERY | Facility: CLINIC | Age: 43
End: 2018-09-18

## 2018-09-18 LAB
BACTERIA SPEC CULT: NORMAL
Lab: NORMAL
SPECIMEN SOURCE: NORMAL

## 2018-09-18 NOTE — TELEPHONE ENCOUNTER
Neurosurgery Telephone Note      I received a call from Bandar. Patient is a 43 year old woman who was recently discharged on 9/11. Patient had infected  shunt removed and has been on long term antibiotics. Patient has had headaches since she was in hospital but they've been tolerable on oxycodone. Patient ran out of the Oxycodone script and would like a refill. I inquired more about the nature of her headaches. She reports that they've been the same since her discharge. Sometime they can go up to 8/10. There is no nausea or vomiting or other symptoms. Usually oxycodone helps with these headaches. She was continuously taking that when she was in the hospital so she felt they were controlled.   I discussed that it is ok to take oxycodone but she should go to the ER if her symptoms do not get better or if she develops any new symptom. I called her PA and left a message requesting if she could refill a script for her.   Patient is supposed to have an appointment with Dr. Salinas in 4 weeks with MRI brain w/wo contrast. So I made sure this is scheduled with the help of our schedulers.    -----------------------------------  Luz Mary MD, MS  Neurosurgery PGY-2

## 2018-09-19 LAB
BACTERIA SPEC CULT: NORMAL
Lab: NORMAL
SPECIMEN SOURCE: NORMAL

## 2018-09-19 NOTE — PROGRESS NOTES
This is a recent snapshot of the patient's [facility] medical record.  For current drug dose and complete information and questions, call [phone] or In [facility] pool, [facility] home infusion ([id number])  CSN Number:  [id number]

## 2018-09-20 LAB
BACTERIA SPEC CULT: NORMAL
Lab: NORMAL
SPECIMEN SOURCE: NORMAL

## 2018-09-21 LAB
BACTERIA SPEC CULT: NORMAL
Lab: NORMAL
SPECIMEN SOURCE: NORMAL

## 2018-09-22 LAB
BACTERIA SPEC CULT: NORMAL
Lab: NORMAL
SPECIMEN SOURCE: NORMAL

## 2018-09-23 LAB
BACTERIA SPEC CULT: NORMAL
Lab: NORMAL
SPECIMEN SOURCE: NORMAL

## 2018-09-24 ENCOUNTER — TRANSFERRED RECORDS (OUTPATIENT)
Dept: HEALTH INFORMATION MANAGEMENT | Facility: CLINIC | Age: 43
End: 2018-09-24

## 2018-09-24 LAB
BACTERIA SPEC CULT: NORMAL
Lab: NORMAL
SPECIMEN SOURCE: NORMAL

## 2018-09-25 ENCOUNTER — HOME INFUSION (PRE-WILLOW HOME INFUSION) (OUTPATIENT)
Dept: PHARMACY | Facility: CLINIC | Age: 43
End: 2018-09-25

## 2018-09-26 ENCOUNTER — TELEPHONE (OUTPATIENT)
Dept: INFECTIOUS DISEASES | Facility: CLINIC | Age: 43
End: 2018-09-26

## 2018-09-26 ENCOUNTER — HEALTH MAINTENANCE LETTER (OUTPATIENT)
Age: 43
End: 2018-09-26

## 2018-09-26 DIAGNOSIS — R51.9 HEADACHE: ICD-10-CM

## 2018-09-26 DIAGNOSIS — G06.0 INTRACRANIAL ABSCESS: Primary | ICD-10-CM

## 2018-09-26 NOTE — TELEPHONE ENCOUNTER
Per Dr Sarkar's instructions during face to face discussion in clinic, the below items were taken care of.    1) Called pt to set up apt on 10/19 to see Dr Sarkar. Request put into scheduling.  2) MRI Brain order put in chart for pt. Orders faxed to [facility].  3) Called [facility] to let them know IV Nafcillin to continue until 10/15 with re-assess.  4) I reports weekly labs are being faxed to Dr Sarkar at [phone].    Radha Cruz RN

## 2018-09-26 NOTE — TELEPHONE ENCOUNTER
[facility] Call Center    Phone Message    May a detailed message be left on voicemail: yes    Reason for Call: Other: Yuki reddy [facility] is needing Dr. Sarkar to sign the patient's home care orders    Action Taken: Message routed to:  [facility]): Infectious Disease

## 2018-10-01 ENCOUNTER — TRANSFERRED RECORDS (OUTPATIENT)
Dept: HEALTH INFORMATION MANAGEMENT | Facility: CLINIC | Age: 43
End: 2018-10-01

## 2018-10-01 LAB
FUNGUS SPEC CULT: NORMAL
Lab: NORMAL
SPECIMEN SOURCE: NORMAL

## 2018-10-02 ENCOUNTER — HOME INFUSION (PRE-WILLOW HOME INFUSION) (OUTPATIENT)
Dept: PHARMACY | Facility: CLINIC | Age: 43
End: 2018-10-02

## 2018-10-03 NOTE — PROGRESS NOTES
This is a recent snapshot of the patient's [facility] medical record.  For current drug dose and complete information and questions, call [phone] or In [facility] pool, [facility] home infusion ([id number])  CSN Number:  [id number]

## 2018-10-05 ENCOUNTER — HOME INFUSION (PRE-WILLOW HOME INFUSION) (OUTPATIENT)
Dept: PHARMACY | Facility: CLINIC | Age: 43
End: 2018-10-05

## 2018-10-07 ENCOUNTER — TRANSFERRED RECORDS (OUTPATIENT)
Dept: HEALTH INFORMATION MANAGEMENT | Facility: CLINIC | Age: 43
End: 2018-10-07

## 2018-10-08 ENCOUNTER — TRANSFERRED RECORDS (OUTPATIENT)
Dept: HEALTH INFORMATION MANAGEMENT | Facility: CLINIC | Age: 43
End: 2018-10-08

## 2018-10-08 NOTE — PROGRESS NOTES
This is a recent snapshot of the patient's [facility] medical record.  For current drug dose and complete information and questions, call [phone] or In Basket pool, [facility] ([id number])  CSN Number:  [id number]

## 2018-10-09 ENCOUNTER — HOME INFUSION (PRE-WILLOW HOME INFUSION) (OUTPATIENT)
Dept: PHARMACY | Facility: CLINIC | Age: 43
End: 2018-10-09

## 2018-10-09 ASSESSMENT — ENCOUNTER SYMPTOMS
EYE REDNESS: 0
TREMORS: 0
EYE WATERING: 0
PANIC: 0
TINGLING: 1
SINUS CONGESTION: 0
HEADACHES: 1
BACK PAIN: 1
ARTHRALGIAS: 1
STIFFNESS: 1
DISTURBANCES IN COORDINATION: 1
DIZZINESS: 1
ALTERED TEMPERATURE REGULATION: 0
MUSCLE WEAKNESS: 0
NECK MASS: 0
MYALGIAS: 1
WEAKNESS: 1
FEVER: 0
MUSCLE CRAMPS: 0
NIGHT SWEATS: 0
MEMORY LOSS: 1
DECREASED APPETITE: 0
INSOMNIA: 1
DOUBLE VISION: 0
HOARSE VOICE: 0
INCREASED ENERGY: 1
EYE PAIN: 1
DEPRESSION: 1
WEIGHT GAIN: 1
TROUBLE SWALLOWING: 0
WEIGHT LOSS: 0
DECREASED CONCENTRATION: 1
POLYDIPSIA: 0
SEIZURES: 0
PARALYSIS: 0
FATIGUE: 1
NECK PAIN: 1
HALLUCINATIONS: 0
CHILLS: 0
POLYPHAGIA: 0
SMELL DISTURBANCE: 0
TASTE DISTURBANCE: 0
LOSS OF CONSCIOUSNESS: 0
SORE THROAT: 0
NUMBNESS: 1
SPEECH CHANGE: 0
JOINT SWELLING: 0
NERVOUS/ANXIOUS: 1
EYE IRRITATION: 0

## 2018-10-10 DIAGNOSIS — R51.9 HEAD ACHE: ICD-10-CM

## 2018-10-10 DIAGNOSIS — G91.2 IDIOPATHIC NORMAL PRESSURE HYDROCEPHALUS (INPH) (H): Primary | ICD-10-CM

## 2018-10-10 DIAGNOSIS — Z53.9 ERRONEOUS ENCOUNTER--DISREGARD: Primary | ICD-10-CM

## 2018-10-10 DIAGNOSIS — G93.0 ARACHNOID CYST: ICD-10-CM

## 2018-10-10 RX ORDER — HYDROCODONE BITARTRATE AND IBUPROFEN 7.5; 2 MG/1; MG/1
1 TABLET, FILM COATED ORAL EVERY 8 HOURS PRN
Qty: 21 TABLET | Refills: 0 | Status: SHIPPED | OUTPATIENT
Start: 2018-10-11 | End: 2018-11-06

## 2018-10-10 NOTE — PROGRESS NOTES
This is a recent snapshot of the patient's [facility] medical record.  For current drug dose and complete information and questions, call [phone] or In Basket pool, [facility] ([id number])  CSN Number:  [id number]

## 2018-10-10 NOTE — PROGRESS NOTES
This is a recent snapshot of the patient's [facility] medical record.  For current drug dose and complete information and questions, call [phone] or In Basket pool, [facility] ([id number])  CSN Number:  [id number]

## 2018-10-11 ENCOUNTER — HOME INFUSION (PRE-WILLOW HOME INFUSION) (OUTPATIENT)
Dept: PHARMACY | Facility: CLINIC | Age: 43
End: 2018-10-11

## 2018-10-12 ENCOUNTER — HOME INFUSION (PRE-WILLOW HOME INFUSION) (OUTPATIENT)
Dept: PHARMACY | Facility: CLINIC | Age: 43
End: 2018-10-12

## 2018-10-12 ENCOUNTER — TELEPHONE (OUTPATIENT)
Dept: INFECTIOUS DISEASES | Facility: CLINIC | Age: 43
End: 2018-10-12

## 2018-10-12 NOTE — PROGRESS NOTES
This is a recent snapshot of the patient's [facility] medical record.  For current drug dose and complete information and questions, call [phone] or In [facility] pool, [facility] home infusion ([id number])  CSN Number:  [id number]

## 2018-10-12 NOTE — TELEPHONE ENCOUNTER
[facility] Call Center    Phone Message    May a detailed message be left on voicemail: yes    Reason for Call: Other: Pt requesting that orders to get her PICC line changed need to be faxed to [facility] in [place], [place]. Phone is [phone], Fax is [phone], attn: Yuki     Action Taken: Message routed to:  [facility]): UC INF DISEASE

## 2018-10-12 NOTE — TELEPHONE ENCOUNTER
Faxed order for PICC dressing changes to [facility], Called pt to let her know she can make dressing change apt now.  Radha Cruz RN

## 2018-10-15 ENCOUNTER — TELEPHONE (OUTPATIENT)
Dept: INFECTIOUS DISEASES | Facility: CLINIC | Age: 43
End: 2018-10-15

## 2018-10-15 ENCOUNTER — TELEPHONE (OUTPATIENT)
Dept: OPHTHALMOLOGY | Facility: CLINIC | Age: 43
End: 2018-10-15

## 2018-10-15 ENCOUNTER — TRANSFERRED RECORDS (OUTPATIENT)
Dept: HEALTH INFORMATION MANAGEMENT | Facility: CLINIC | Age: 43
End: 2018-10-15

## 2018-10-15 DIAGNOSIS — Z98.2 S/P VP SHUNT: Primary | ICD-10-CM

## 2018-10-15 NOTE — TELEPHONE ENCOUNTER
[facility] Call Center    Phone Message    May a detailed message be left on voicemail: Yes    Reason for Call: Other: Dr. Brad leyva like Dr. Joseph to see this Pt on 11/5 before after MRI is possible. Pt travels a long distance and is already scheduled for MRI and follow up with Dr. Salinas.     Action Taken: Message routed to:  [facility] Eye

## 2018-10-15 NOTE — TELEPHONE ENCOUNTER
[facility] Call Center    Phone Message    May a detailed message be left on voicemail: yes    Reason for Call: Other: Mery is calling as pt stated she needs labs done for Dr. Austin.  Please fax orders to [phone].  They will fax back the results to [phone].  Thank you!     Action Taken: Other: P Infectious Disease Adult [facility]

## 2018-10-15 NOTE — PROGRESS NOTES
This is a recent snapshot of the patient's [facility] medical record.  For current drug dose and complete information and questions, call [phone] or In Basket pool, [facility] home [facility] ([id number])  CSN Number:  [id number]

## 2018-10-16 NOTE — TELEPHONE ENCOUNTER
Health Call Center    Phone Message    May a detailed message be left on voicemail: no    Reason for Call: Other: Mery from [facility] calling in again regarding orders for labs from Dr. Sarkar. They need orders for weekly labs faxed to them, please back date to 10/15/18, as patient was there for over two hours waiting yesterday for labs & they finally seema them, but they need the orders. Please fax to [phone].      Action Taken: Message routed to:  [facility] ([facility]): Infectious Disease

## 2018-10-16 NOTE — PROGRESS NOTES
Per Dr Sarkar via staff note, OK to put in order for weekly lab draw for pt while getting IV Abx. Orders faxed to [facility].  Radha Cruz RN

## 2018-10-17 ENCOUNTER — HOME INFUSION (PRE-WILLOW HOME INFUSION) (OUTPATIENT)
Dept: PHARMACY | Facility: CLINIC | Age: 43
End: 2018-10-17

## 2018-10-17 ENCOUNTER — OFFICE VISIT (OUTPATIENT)
Dept: INFECTIOUS DISEASES | Facility: CLINIC | Age: 43
End: 2018-10-17
Attending: INTERNAL MEDICINE
Payer: COMMERCIAL

## 2018-10-17 VITALS
TEMPERATURE: 98.6 F | SYSTOLIC BLOOD PRESSURE: 129 MMHG | OXYGEN SATURATION: 98 % | HEART RATE: 70 BPM | BODY MASS INDEX: 27.67 KG/M2 | DIASTOLIC BLOOD PRESSURE: 83 MMHG | WEIGHT: 198.4 LBS

## 2018-10-17 DIAGNOSIS — G06.0 INTRACRANIAL ABSCESS: Primary | ICD-10-CM

## 2018-10-17 DIAGNOSIS — R51.9 FRONTAL HEADACHE: ICD-10-CM

## 2018-10-17 DIAGNOSIS — H53.9 VISUAL CHANGES: ICD-10-CM

## 2018-10-17 PROCEDURE — G0463 HOSPITAL OUTPT CLINIC VISIT: HCPCS | Mod: ZF

## 2018-10-17 ASSESSMENT — PAIN SCALES - GENERAL: PAINLEVEL: MILD PAIN (3)

## 2018-10-17 NOTE — LETTER
10/17/2018    RE: Bandar Olmedo  [address]  [facility] [address]     INFECTIOUS DISEASES PROGRESS NOTE    Infectious Diseases Clinic     SUBJECTIVE:                                                      Bandar Olmedo is a 43 year old female who presents to clinic today for the following health issues: intracranial abscess , ventricultis, meningitis  ( MSSA)    43 year old year old female with ADHD, OA, depression, anxiety and  vellum interpositum intracranial arachnoid cyst s/p ventriculoperiotoneal shunt 7/18/2018, post op seizure on keppra transefrred from [facility] to [facility] on 9/1/2018  after a  fall with worsening headaches,  tenderness over the shunt  and noted to have purulent drainage from the surgical site.     CSF on 9/1/18 grew MSSA .  shunt was removed and EVD was placed on 9/2/18.  serial CSF fluid cx  from EVD was donweand last positive cx was on 9/2. subsequent daily cx from 9/3-9/10 were negative. EVD was removed on 9/10/10 and no  shunt was needed . she was discharged on 9/12/18.      she has since returned home and still on Nafcillin. Rifampin was discontinued more than 3 weeks ago.   she denies fever, chills, neck stiffness, nausea,vomiting, diarrhea. she still c/o frontal headaches but her vision is slowly getting better. she is able to walk , no falls since her discharge from hospital. she has not seen her PCP since d/c. she complains of chronic arthritic pain.  she is here with her boyfriend . they live in [place], [place].     Lab: CRP 0.8, WBC 4Cr 0.8, Normal AST, ALT (10/8/18)     ROS:  CONSTITUTIONAL:NEGATIVE for fever, chills, change in weight  INTEGUMENTARY/SKIN: NEGATIVE for worrisome rashes, moles or lesions  EYES: NEGATIVE for vision changes or irritation  ENT/MOUTH: NEGATIVE for ear, mouth and throat problems  RESP:NEGATIVE for significant cough or SOB  CV: NEGATIVE for chest pain, palpitations or peripheral edema  GI: NEGATIVE for nausea, abdominal pain, heartburn, or  change in bowel habits  : NEGATIVE for urinary frequency, hematuria or dysuria  MUSCULOSKELETAL: NEGATIVE for significant arthralgias or myalgia  NEURO: see HPI   ENDOCRINE: NEGATIVE for temperature intolerance, skin/hair changes  HEME/ALLERGY/IMMUNE: NEGATIVE for bleeding problems  PSYCHIATRIC: NEGATIVE for changes in mood or affect    Problem list and histories reviewed & adjusted, as indicated.  Additional history: as documented    PAST SURGICAL HISTORY:  Patient  has a past surgical history that includes Optical tracking system implant shunt ventriculoperitoneal (Right, 9/2/2018) and Remove shunt ventriculoperitoneal (Right, 9/2/2018).    CURRENT MEDICATIONS:  Current Outpatient Prescriptions   Medication Sig Dispense Refill     Acetaminophen (TYLENOL PO) Take 500 mg by mouth every 6 hours as needed for mild pain or fever       ClonazePAM (KLONOPIN PO) Take 1 mg by mouth 3 times daily as needed for anxiety       Diclofenac Sodium (VOLTAREN PO) Take 50 mg by mouth 3 times daily as needed for moderate pain       HYDROcodone-ibuprofen (VICOPROFEN) 7.5-200 MG per tablet Take 1 tablet by mouth every 8 hours as needed for severe pain 21 tablet 0     levETIRAcetam 1000 MG TABS Take 1,000 mg by mouth 2 times daily 60 tablet 1     Levothyroxine Sodium (SYNTHROID PO) Take 75 mcg by mouth daily       norgestimate-ethinyl estradiol (ORTHO-CYCLEN, SPRINTEC) 0.25-35 MG-MCG per tablet Take 1 tablet by mouth daily       Zolpidem Tartrate (AMBIEN PO) Take 10 mg by mouth nightly as needed for sleep       oxyCODONE IR (ROXICODONE) 5 MG tablet Take 1 tablet (5 mg) by mouth every 4 hours as needed for other (pain control or improvement in physical function. Hold dose for analgesic side effects.) (Patient not taking: Reported on 10/17/2018) 60 tablet 0     polyethylene glycol (MIRALAX/GLYCOLAX) Packet Take 17 g by mouth 3 times daily (Patient not taking: Reported on 10/17/2018) 30 packet 1     senna-docusate (SENOKOT-S;PERICOLACE)  8.6-50 MG per tablet Take 2 tablets by mouth 2 times daily (Patient not taking: Reported on 10/17/2018) 30 tablet 0       ALLERGY:  No Known Allergies      OBJECTIVE:                                                    /83  Pulse 70  Temp 98.6  F (37  C) (Oral)  Wt 90 kg (198 lb 6.4 oz)  SpO2 98%  BMI 27.67 kg/m2 Body mass index is 27.67 kg/(m^2).   GENERAL: healthy, alert and no distress  EYES: Eyes grossly normal to inspection, PERRL and conjunctivae and sclerae normal  HENT:  mouth without ulcers or lesions  NECK: no adenopathy, no asymmetry, masses, or scars and thyroid normal to palpation  RESP: lungs clear to auscultation - no rales, rhonchi or wheezes  CV: regular rate and rhythm, normal S1 S2, no murmur, click or rub, no peripheral edema   ABDOMEN: soft, nontender, no hepatosplenomegaly, no masses and bowel sounds normal  MS: no gross musculoskeletal defects noted, no edema  SKIN: no suspicious lesions or rashes  NEURO: Normal strength and tone, mentation intact and speech normal, gait - steady   BACK: no CVA tenderness, no paralumbar tenderness  PSYCH: mentation appears normal, affect normal  LYMPH: no cervical, supraclavicular, axillary  adenopathy  PICC line : left arm - ok, non tender        ASSESSMENT AND PLAN:                                                      43 year old year old female with ADHD, OA, depression, anxiety and  vellum interpositum intracranial arachnoid cyst s/p ventriculoperiotoneal shunt 7/18/2018 admitted on 9/1/2018 with severe headaches, unresponsiveness and noted to have ventriculitis and edema due to  shunt placed at OSH.   1. Arachnoid cyst detected 7/2018 s/p EVD and ultimately VPS on 7/18/[facility] complicated by ventriculitis -  Staph aureus MSSA + small abscesses seen on MRI 9/2/18  - S/p removal VPS and placement EVD 9/2/18.  - CT brain wo contrast 9/4 comparison 9/3/18   1. Stable positioning of right frontal approach ventriculostomy  catheter, without significant change in size of the ventricles since prior exam.  2. Continued vasogenic edema involving predominantly the white matter of the right occipital parietal distribution, not significantly changed from prior exam.  3. No evidence of intracranial hemorrhage.     CT brain wo contrast 9/3/18  Impression:    1. Rim-enhancing fluid collection seen on MRI are not demonstrated on this CT examination there is continued vasogenic edema involving predominantly the right matter of the right occipital parietal  distribution, not significantly changed from 9/2/2018. Continued mass effect and effacement of the right occipital horn.  2. Stable positioning of right frontal approach ventriculostomy catheter, without significant change in size of the ventricles since prior exam.  3. No evidence for intracranial hemorrhage.      - MRI brain w and wo contrast 9/2/18   Impression:  1. Small rim-enhancing fluid collections in the posterior right cerebral hemisphere along the ventriculostomy catheter tract, most likely small abscess along a catheter tract.  2. Confluent abnormal T2 signal surrounding these collections is favored edema.     MRI brain w/wo contrast 10/7/18 ( [facility] )   FINDINGS: Since comparison exam, the RIGHT occipital approach  shunt has been removed. Improved T2/FLAIR hyperintense signal abnormality, with decreased T2/FLAIR hyperintense signal within the splenium of the corpus callosum and about the posterior aspect of the RIGHT lateral ventricle. T2/FLAIR hyperintense signal abnormality continues to involve the paramedian RIGHT posterior parietal lobe as well as the RIGHT and central aspects of the splenium. Similarly, the previously described T2 hypointense lesions around the previously noted  shunt catheter have decreased in size. There remains two small peripherally-enhancing foci, one on axial post gadolinium image 16 and one on axial post gadolinium image 17. These measure  approximately 1-2 mm and 3-4 mm, respectively. Lack of postcontrast imaging previously limits comparison, but these are likely improved. Otherwise, there is enhancement and signal abnormality along the course of the prior RIGHT occipital approach  shunt catheter, without additional discrete peripherally enhancing abscess. Small focus of restricted diffusion in the RIGHT paramedian parietal lobe measures 4 mm (diffusion image 17) and is unchanged since comparison. Small focus of susceptibility artifact/hemosiderin deposition along the posterior aspect of this area of restricted diffusion. Additional small amount of linear susceptibility tracking along the prior RIGHT occipital  shunt tract. Stable focus of restricted diffusion in the central aspect of the splenium of the corpus callosum (diffusion image 19). Decreased restricted diffusion in the RIGHT fornix. Stable focus of restricted diffusion in the LEFT fornix. Resolved foci of mild diffusion restriction in the dependent LEFT lateral ventricle and LEFT occipital horn. Improved nonenhancing T2 hyperintense signal abnormality along the anterior aspect of the LEFT frontal horn of the lateral ventricle. Stable T2 nonenhancing/FLAIR signal abnormality along the course of a prior RIGHT frontal approach  catheter tract. Stable hydrocephalus involving the lateral and third ventricles. Stable appearance of the anterior septum pellucidum and cavum septum interpositum cyst. Major intracranial flow voids are preserved. LEFT dominant vertebrobasilar system. No evidence of acute sinusitis. Minimal mucosal thickening in the paranasal sinuses. No mastoid effusion.    IMPRESSION: Improving sequela related to RIGHT occipital approach  shunt catheter infection, as described above.    2.  Depression/anxiety  3. ADHD  4. PICC placed on 9/4/18     Recommendations:   - continue Nafcillin ( brain abscess - imprroving but not totally resolved)  and weekly lab. repeat MRI brain on  11/5/18 and she will have a f/u with Neurosurgery after MRI .   - continue daily probiotic   - she asks me to refer her to eye doctor - ordered opthalmology consult   - Follow-up with me on 11/6/18   - also advised her to f/u with her PCP       Rei Austin MD, M.Med.Sc  Division of Infectious Diseases and International Medicine  [facility]      60 minutes was spent with patient and greater than 50% of the time was spent counseling and coordination of care

## 2018-10-17 NOTE — NURSING NOTE
Chief Complaint   Patient presents with     RECHECK     brain abscess     /83  Pulse 70  Temp 98.6  F (37  C) (Oral)  Wt 90 kg (198 lb 6.4 oz)  SpO2 98%  BMI 27.67 kg/m2  Tierra Perez MA

## 2018-10-17 NOTE — MR AVS SNAPSHOT
After Visit Summary   10/17/2018    Bandar Olmedo    MRN: [id number]           Patient Information     Date Of Birth          1975        Visit Information        Provider Department      10/17/2018 1:00 PM Rei Austin MD [facility] and Infectious Diseases        Today's Diagnoses     Intracranial abscess / ventriculitis- MSSA - s/p  shunt removal     -  1    Visual changes ( left peripheral vision loss)         Frontal headache          Care Instructions    at  10 am            Follow-ups after your visit        Additional Services     OPHTHALMOLOGY ADULT REFERRAL       Your provider has referred you to: [facility]: [facility] [place] [phone]   [email or web address]    Please be aware that coverage of these services is subject to the terms and limitations of your health insurance plan.  Call member services at your health plan with any benefit or coverage questions.      Please bring the following with you to your appointment:    (1) Any X-Rays, CTs or MRIs which have been performed.  Contact the facility where they were done to arrange for  prior to your scheduled appointment.    (2) List of current medications  (3) This referral request   (4) Any documents/labs given to you for this referral                  Follow-up notes from your care team     Return in about 3 weeks (around 11/6/2018).      Your next 10 appointments already scheduled     Nov 05, 2018  4:00 PM CST   MR BRAIN W/O & W CONTRAST with [address] [facility] ([facility])    [address] [place] [address] Floor  [facility] [address] [phone]           How do I prepare for my exam? (Food and drink instructions) **If you will be receiving sedation or general anesthesia, please see special notes below.**  How do I prepare for my exam? (Other instructions) Take your medicines as usual, unless your doctor tells you not  to. You may or may not receive intravenous (IV) contrast for this exam pending the discretion of the Radiologist.  You do not need to do anything special to prepare.  **If you will be receiving sedation or general anesthesia, please see special notes below.**  What should I wear: The MRI machine uses a strong magnet. Please wear clothes without metal (snaps, zippers). A sweatsuit works well, or we may give you a hospital gown. Please remove any body piercings and hair extensions before you arrive. You will also remove watches, jewelry, hairpins, wallets, dentures, partial dental plates and hearing aids. You may wear contact lenses, and you may be able to wear your rings. We have a safe place to keep your personal items, but it is safer to leave them at home.  How long does the exam take: Most tests take 30 to 60 minutes.  HOWEVER, IF YOUR DOCTOR PRESCRIBES ANESTHESIA please plan on spending four to five hours in the recovery room.  What should I bring:  Bring a list of your current medicines to your exam (including vitamins, minerals and over-the-counter drugs).  Do I need a :  **If you will be receiving sedation or general anesthesia, please see special notes below.**  What should I do after the exam: No Restrictions, You may resume normal activities.  What is this test: MRI (magnetic resonance imaging) uses a strong magnet and radio waves to look inside the body. An MRA (magnetic resonance angiogram) does the same thing, but it lets us look at your blood vessels. A computer turns the radio waves into pictures showing cross sections of the body, much like slices of bread. This helps us see any problems more clearly. You may receive fluid (called  contrast ) before or during your scan. The fluid helps us see the pictures better. We give the fluid through an IV (small needle in your arm).  Who should I call with questions:  Please call the Imaging Department at your exam site with any questions. Directions,  parking instructions, and other information is available on our website, [email or web address].  How do I prepare if I m having sedation or anesthesia? **IMPORTANT** THE INSTRUCTIONS BELOW ARE ONLY FOR THOSE PATIENTS WHO HAVE BEEN TOLD THEY WILL RECEIVE SEDATION OR GENERAL ANESTHESIA DURING THEIR MRI PROCEDURE:  IF YOU WILL RECEIVE SEDATION (take medicine to help you relax during your exam): You must get the medicine from your doctor before you arrive. Bring the medicine to the exam. Do not take it at home. Arrive one hour early. Bring someone who can take you home after the test. Your medicine will make you sleepy. After the exam, you may not drive, take a bus or take a taxi by yourself. No eating 8 hours before your exam. You may have clear liquids up until 4 hours before your exam. (Clear liquids include water, clear tea, black coffee and fruit juice without pulp.)  IF YOU WILL RECEIVE ANESTHESIA (be asleep for your exam): Arrive 1 1/2 hours early. Bring someone who can take you home after the test. You may not drive, take a bus or take a taxi by yourself. No eating 8 hours before your exam. You may have clear liquids up until 4 hours before your exam. (Clear liquids include water, clear tea, black coffee and fruit juice without pulp.)            Nov 05, 2018  4:45 PM CST   (Arrive by 4:30 PM)   Return Visit with Antonio Salinas MD   [facility] ([facility])    [address] [place] [address]  [address]  [facility] [address]   [phone]            Nov 06, 2018 10:00 AM CST   (Arrive by 9:45 AM)   Return Visit with Rei Austin MD   [facility] and Infectious Diseases ([facility])    [address] [place] [address]  [address]  [facility] [address]   [phone]            Nov 06, 2018 12:45 PM CST   NEW GENERAL with Jules Wolf MD   [facility] Clinic ([facility])    [address]  [address]  [facility] [address]   [phone]              Who to contact     If you have questions or need follow up information about today's clinic visit or your schedule please contact [facility] AND INFECTIOUS DISEASES directly at [phone].  Normal or non-critical lab and imaging results will be communicated to you by MyChart, letter or phone within 4 business days after the clinic has received the results. If you do not hear from us within 7 days, please contact the clinic through MyChart or phone. If you have a critical or abnormal lab result, we will notify you by phone as soon as possible.  Submit refill requests through [employer] or call your pharmacy and they will forward the refill request to us. Please allow 3 business days for your refill to be completed.          Additional Information About Your Visit        MyChart Information     [employer] gives you secure access to your electronic health record. If you see a primary care provider, you can also send messages to your care team and make appointments. If you have questions, please call your primary care clinic.  If you do not have a primary care provider, please call [phone] and they will assist you.        Care EveryWhere ID     This is your Care EveryWhere ID. This could be used by other organizations to access your [facility] medical records  [id number]        Your Vitals Were     Pulse Temperature Pulse Oximetry BMI (Body Mass Index)          70 98.6  F (37  C) (Oral) 98% 27.67 kg/m2         Blood Pressure from Last 3 Encounters:   10/17/18 129/83   09/12/18 135/83    Weight from Last 3 Encounters:   10/17/18 90 kg (198 lb 6.4 oz)   09/10/18 86.3 kg (190 lb 4.1 oz)              We Performed the Following     OPHTHALMOLOGY ADULT REFERRAL        Primary Care Provider Office Phone # Fax #    Amna Benitez [phone]       [facility] [address]  [facility] [address]        Equal  Access to Services     [facility] : Hadii judy cortes pretty Cosme, waaxda luqadaha, qaybta kaalmazuleyka lorenz, hemanth rosas. So [facility] [phone].    ATENCIÓN: Si juanis torres, tiene a christopher disposición servicios gratuitos de asistencia lingüística. Llame al [phone].    We comply with applicable federal civil rights laws and [place] laws. We do not discriminate on the basis of race, color, national origin, age, disability, sex, sexual orientation, or gender identity.            Thank you!     Thank you for choosing [facility] AND INFECTIOUS DISEASES  for your care. Our goal is always to provide you with excellent care. Hearing back from our patients is one way we can continue to improve our services. Please take a few minutes to complete the written survey that you may receive in the mail after your visit with us. Thank you!             Your Updated Medication List - Protect others around you: Learn how to safely use, store and throw away your medicines at www.disposemymeds.org.          This list is accurate as of 10/17/18  2:53 PM.  Always use your most recent med list.                   Brand Name Dispense Instructions for use Diagnosis    AMBIEN PO      Take 10 mg by mouth nightly as needed for sleep        HYDROcodone-ibuprofen 7.5-200 MG per tablet    VICOPROFEN    21 tablet    Take 1 tablet by mouth every 8 hours as needed for severe pain    Idiopathic normal pressure hydrocephalus (INPH), Arachnoid cyst, Head ache       KLONOPIN PO      Take 1 mg by mouth 3 times daily as needed for anxiety        levETIRAcetam 1000 MG Tabs     60 tablet    Take 1,000 mg by mouth 2 times daily    Infection of ventricular shunt, initial encounter (H)       norgestimate-ethinyl estradiol 0.25-35 MG-MCG per tablet    ORTHO-CYCLEN, SPRINTEC     Take 1 tablet by mouth daily        oxyCODONE IR 5 MG tablet    ROXICODONE    60 tablet    Take 1 tablet (5 mg) by mouth every 4 hours as  needed for other (pain control or improvement in physical function. Hold dose for analgesic side effects.)    Infection of ventricular shunt, initial encounter (H)       polyethylene glycol Packet    MIRALAX/GLYCOLAX    30 packet    Take 17 g by mouth 3 times daily    Infection of ventricular shunt, initial encounter (H)       senna-docusate 8.6-50 MG per tablet    SENOKOT-S;PERICOLACE    30 tablet    Take 2 tablets by mouth 2 times daily    Infection of ventricular shunt, initial encounter (H)       SYNTHROID PO      Take 75 mcg by mouth daily        TYLENOL PO      Take 500 mg by mouth every 6 hours as needed for mild pain or fever        VOLTAREN PO      Take 50 mg by mouth 3 times daily as needed for moderate pain

## 2018-10-17 NOTE — TELEPHONE ENCOUNTER
Faxed lab orders for weekly lab draws to [facility] in [facility].  Radha Cruz RN

## 2018-10-18 NOTE — PROGRESS NOTES
INFECTIOUS DISEASES PROGRESS NOTE    Infectious Diseases Clinic     SUBJECTIVE:                                                      Bandar Olmedo is a 43 year old female who presents to clinic today for the following health issues: intracranial abscess , ventricultis, meningitis  ( MSSA)    43 year old year old female with ADHD, OA, depression, anxiety and  vellum interpositum intracranial arachnoid cyst s/p ventriculoperiotoneal shunt 7/18/2018, post op seizure on keppra transefrred from [facility] to [facility] on 9/1/2018  after a  fall with worsening headaches,  tenderness over the shunt  and noted to have purulent drainage from the surgical site.     CSF on 9/1/18 grew MSSA .  shunt was removed and EVD was placed on 9/2/18.  serial CSF fluid cx  from EVD was donweand last positive cx was on 9/2. subsequent daily cx from 9/3-9/10 were negative. EVD was removed on 9/10/10 and no  shunt was needed . she was discharged on 9/12/18.      she has since returned home and still on Nafcillin. Rifampin was discontinued more than 3 weeks ago.   she denies fever, chills, neck stiffness, nausea,vomiting, diarrhea. she still c/o frontal headaches but her vision is slowly getting better. she is able to walk , no falls since her discharge from hospital. she has not seen her PCP since d/c. she complains of chronic arthritic pain.  she is here with her boyfriend . they live in [place], [place].     Lab: CRP 0.8, WBC 4Cr 0.8, Normal AST, ALT (10/8/18)     ROS:  CONSTITUTIONAL:NEGATIVE for fever, chills, change in weight  INTEGUMENTARY/SKIN: NEGATIVE for worrisome rashes, moles or lesions  EYES: NEGATIVE for vision changes or irritation  ENT/MOUTH: NEGATIVE for ear, mouth and throat problems  RESP:NEGATIVE for significant cough or SOB  CV: NEGATIVE for chest pain, palpitations or peripheral edema  GI: NEGATIVE for nausea, abdominal pain, heartburn, or change in bowel habits  : NEGATIVE for urinary frequency, hematuria or  dysuria  MUSCULOSKELETAL: NEGATIVE for significant arthralgias or myalgia  NEURO: see HPI   ENDOCRINE: NEGATIVE for temperature intolerance, skin/hair changes  HEME/ALLERGY/IMMUNE: NEGATIVE for bleeding problems  PSYCHIATRIC: NEGATIVE for changes in mood or affect    Problem list and histories reviewed & adjusted, as indicated.  Additional history: as documented    PAST SURGICAL HISTORY:  Patient  has a past surgical history that includes Optical tracking system implant shunt ventriculoperitoneal (Right, 9/2/2018) and Remove shunt ventriculoperitoneal (Right, 9/2/2018).    CURRENT MEDICATIONS:  Current Outpatient Prescriptions   Medication Sig Dispense Refill     Acetaminophen (TYLENOL PO) Take 500 mg by mouth every 6 hours as needed for mild pain or fever       ClonazePAM (KLONOPIN PO) Take 1 mg by mouth 3 times daily as needed for anxiety       Diclofenac Sodium (VOLTAREN PO) Take 50 mg by mouth 3 times daily as needed for moderate pain       HYDROcodone-ibuprofen (VICOPROFEN) 7.5-200 MG per tablet Take 1 tablet by mouth every 8 hours as needed for severe pain 21 tablet 0     levETIRAcetam 1000 MG TABS Take 1,000 mg by mouth 2 times daily 60 tablet 1     Levothyroxine Sodium (SYNTHROID PO) Take 75 mcg by mouth daily       norgestimate-ethinyl estradiol (ORTHO-CYCLEN, SPRINTEC) 0.25-35 MG-MCG per tablet Take 1 tablet by mouth daily       Zolpidem Tartrate (AMBIEN PO) Take 10 mg by mouth nightly as needed for sleep       oxyCODONE IR (ROXICODONE) 5 MG tablet Take 1 tablet (5 mg) by mouth every 4 hours as needed for other (pain control or improvement in physical function. Hold dose for analgesic side effects.) (Patient not taking: Reported on 10/17/2018) 60 tablet 0     polyethylene glycol (MIRALAX/GLYCOLAX) Packet Take 17 g by mouth 3 times daily (Patient not taking: Reported on 10/17/2018) 30 packet 1     senna-docusate (SENOKOT-S;PERICOLACE) 8.6-50 MG per tablet Take 2 tablets by mouth 2 times daily (Patient not  taking: Reported on 10/17/2018) 30 tablet 0       ALLERGY:  No Known Allergies      OBJECTIVE:                                                    /83  Pulse 70  Temp 98.6  F (37  C) (Oral)  Wt 90 kg (198 lb 6.4 oz)  SpO2 98%  BMI 27.67 kg/m2 Body mass index is 27.67 kg/(m^2).   GENERAL: healthy, alert and no distress  EYES: Eyes grossly normal to inspection, PERRL and conjunctivae and sclerae normal  HENT:  mouth without ulcers or lesions  NECK: no adenopathy, no asymmetry, masses, or scars and thyroid normal to palpation  RESP: lungs clear to auscultation - no rales, rhonchi or wheezes  CV: regular rate and rhythm, normal S1 S2, no murmur, click or rub, no peripheral edema   ABDOMEN: soft, nontender, no hepatosplenomegaly, no masses and bowel sounds normal  MS: no gross musculoskeletal defects noted, no edema  SKIN: no suspicious lesions or rashes  NEURO: Normal strength and tone, mentation intact and speech normal, gait - steady   BACK: no CVA tenderness, no paralumbar tenderness  PSYCH: mentation appears normal, affect normal  LYMPH: no cervical, supraclavicular, axillary  adenopathy  PICC line : left arm - ok, non tender        ASSESSMENT AND PLAN:                                                      43 year old year old female with ADHD, OA, depression, anxiety and  vellum interpositum intracranial arachnoid cyst s/p ventriculoperiotoneal shunt 7/18/2018 admitted on 9/1/2018 with severe headaches, unresponsiveness and noted to have ventriculitis and edema due to  shunt placed at OSH.   1. Arachnoid cyst detected 7/2018 s/p EVD and ultimately VPS on 7/18/18 at [facility] complicated by ventriculitis -  Staph aureus MSSA + small abscesses seen on MRI 9/2/18  - S/p removal VPS and placement EVD 9/2/18.  - CT brain wo contrast 9/4 comparison 9/3/18   1. Stable positioning of right frontal approach ventriculostomy catheter, without significant change in size of the ventricles since prior  exam.  2. Continued vasogenic edema involving predominantly the white matter of the right occipital parietal distribution, not significantly changed from prior exam.  3. No evidence of intracranial hemorrhage.     CT brain wo contrast 9/3/18  Impression:    1. Rim-enhancing fluid collection seen on MRI are not demonstrated on this CT examination there is continued vasogenic edema involving predominantly the right matter of the right occipital parietal  distribution, not significantly changed from 9/2/2018. Continued mass effect and effacement of the right occipital horn.  2. Stable positioning of right frontal approach ventriculostomy catheter, without significant change in size of the ventricles since prior exam.  3. No evidence for intracranial hemorrhage.      - MRI brain w and wo contrast 9/2/18   Impression:  1. Small rim-enhancing fluid collections in the posterior right cerebral hemisphere along the ventriculostomy catheter tract, most likely small abscess along a catheter tract.  2. Confluent abnormal T2 signal surrounding these collections is favored edema.     MRI brain w/wo contrast 10/7/18 ( [facility] )   FINDINGS: Since comparison exam, the RIGHT occipital approach  shunt has been removed. Improved T2/FLAIR hyperintense signal abnormality, with decreased T2/FLAIR hyperintense signal within the splenium of the corpus callosum and about the posterior aspect of the RIGHT lateral ventricle. T2/FLAIR hyperintense signal abnormality continues to involve the paramedian RIGHT posterior parietal lobe as well as the RIGHT and central aspects of the splenium. Similarly, the previously described T2 hypointense lesions around the previously noted  shunt catheter have decreased in size. There remains two small peripherally-enhancing foci, one on axial post gadolinium image 16 and one on axial post gadolinium image 17. These measure approximately 1-2 mm and 3-4 mm, respectively. Lack of postcontrast imaging previously  limits comparison, but these are likely improved. Otherwise, there is enhancement and signal abnormality along the course of the prior RIGHT occipital approach  shunt catheter, without additional discrete peripherally enhancing abscess. Small focus of restricted diffusion in the RIGHT paramedian parietal lobe measures 4 mm (diffusion image 17) and is unchanged since comparison. Small focus of susceptibility artifact/hemosiderin deposition along the posterior aspect of this area of restricted diffusion. Additional small amount of linear susceptibility tracking along the prior RIGHT occipital  shunt tract. Stable focus of restricted diffusion in the central aspect of the splenium of the corpus callosum (diffusion image 19). Decreased restricted diffusion in the RIGHT fornix. Stable focus of restricted diffusion in the LEFT fornix. Resolved foci of mild diffusion restriction in the dependent LEFT lateral ventricle and LEFT occipital horn. Improved nonenhancing T2 hyperintense signal abnormality along the anterior aspect of the LEFT frontal horn of the lateral ventricle. Stable T2 nonenhancing/FLAIR signal abnormality along the course of a prior RIGHT frontal approach  catheter tract. Stable hydrocephalus involving the lateral and third ventricles. Stable appearance of the anterior septum pellucidum and cavum septum interpositum cyst. Major intracranial flow voids are preserved. LEFT dominant vertebrobasilar system. No evidence of acute sinusitis. Minimal mucosal thickening in the paranasal sinuses. No mastoid effusion.    IMPRESSION: Improving sequela related to RIGHT occipital approach  shunt catheter infection, as described above.    2.  Depression/anxiety  3. ADHD  4. PICC placed on 9/4/18     Recommendations:   - continue Nafcillin ( brain abscess - imprroving but not totally resolved)  and weekly lab. repeat MRI brain on 11/5/18 and she will have a f/u with Neurosurgery after MRI .   - continue daily  probiotic   - she asks me to refer her to eye doctor - ordered opthalmology consult   - Follow-up with me on 11/6/18   - also advised her to f/u with her PCP       Rei Austin MD, M.Med.Sc  Division of Infectious Diseases and International Medicine  [facility]      60 minutes was spent with patient and greater than 50% of the time was spent counseling and coordination of care

## 2018-10-22 ENCOUNTER — TELEPHONE (OUTPATIENT)
Dept: INFECTIOUS DISEASES | Facility: CLINIC | Age: 43
End: 2018-10-22

## 2018-10-22 NOTE — TELEPHONE ENCOUNTER
Health Call Center    Phone Message    May a detailed message be left on voicemail: yes    Reason for Call: Other: Malena bills/ [facility] in [place], is calling because she has the orders for the Picc line dressing change weekly by Dr. Austin but the Pt is stating that she needs to have labs done as well and [facility] does not have orders to draw blood. Please fax orders over to [phone]      Action Taken: Message routed to:  [facility] ([facility]): ID

## 2018-10-24 ENCOUNTER — HOME INFUSION (PRE-WILLOW HOME INFUSION) (OUTPATIENT)
Dept: PHARMACY | Facility: CLINIC | Age: 43
End: 2018-10-24

## 2018-10-25 ENCOUNTER — HOME INFUSION (PRE-WILLOW HOME INFUSION) (OUTPATIENT)
Dept: PHARMACY | Facility: CLINIC | Age: 43
End: 2018-10-25

## 2018-10-25 NOTE — PROGRESS NOTES
This is a recent snapshot of the patient's [facility] medical record.  For current drug dose and complete information and questions, call [phone] or In Basket pool, fv home infusion ([id number])  CSN Number:  [id number]

## 2018-10-29 ENCOUNTER — TRANSFERRED RECORDS (OUTPATIENT)
Dept: HEALTH INFORMATION MANAGEMENT | Facility: CLINIC | Age: 43
End: 2018-10-29

## 2018-10-29 ENCOUNTER — MEDICAL CORRESPONDENCE (OUTPATIENT)
Dept: HEALTH INFORMATION MANAGEMENT | Facility: CLINIC | Age: 43
End: 2018-10-29

## 2018-10-29 ENCOUNTER — TELEPHONE (OUTPATIENT)
Dept: INFECTIOUS DISEASES | Facility: CLINIC | Age: 43
End: 2018-10-29

## 2018-10-29 NOTE — TELEPHONE ENCOUNTER
[facility] Call Center    Phone Message    May a detailed message be left on voicemail: yes    Reason for Call: Other: Syed RN from [facility] in [place], that she needs orders placed for picc line dressing change for facility protocol. Please give her a call back. Pt is also recieving dressing through mail.     Action Taken: Message routed to:  [facility] ([facility]): Infectious Disease

## 2018-10-29 NOTE — PROGRESS NOTES
This is a recent snapshot of the patient's [facility] medical record.  For current drug dose and complete information and questions, call [phone] or In [facility] pool, fv home infusion ([id number])  CSN Number:  [id number]

## 2018-10-31 ENCOUNTER — HOME INFUSION (PRE-WILLOW HOME INFUSION) (OUTPATIENT)
Dept: PHARMACY | Facility: CLINIC | Age: 43
End: 2018-10-31

## 2018-10-31 NOTE — TELEPHONE ENCOUNTER
Called Radha from [facility] in [place] [phone]  to let her know orders for weekly dressing changes and weekly lab draws have been faxed to [phone].  Radha Cruz RN

## 2018-11-01 DIAGNOSIS — H53.10 SUBJECTIVE VISUAL DISTURBANCE: Primary | ICD-10-CM

## 2018-11-01 NOTE — PROGRESS NOTES
This is a recent snapshot of the patient's [facility] medical record.  For current drug dose and complete information and questions, call [phone] or In Basket pool, fv home infusion ([id number])  CSN Number:  [id number]

## 2018-11-05 ENCOUNTER — OFFICE VISIT (OUTPATIENT)
Dept: NEUROSURGERY | Facility: CLINIC | Age: 43
End: 2018-11-05
Attending: NEUROLOGICAL SURGERY
Payer: COMMERCIAL

## 2018-11-05 ENCOUNTER — RADIANT APPOINTMENT (OUTPATIENT)
Dept: MRI IMAGING | Facility: CLINIC | Age: 43
End: 2018-11-05
Attending: NURSE PRACTITIONER
Payer: COMMERCIAL

## 2018-11-05 ENCOUNTER — OFFICE VISIT (OUTPATIENT)
Dept: OPHTHALMOLOGY | Facility: CLINIC | Age: 43
End: 2018-11-05
Attending: OPHTHALMOLOGY
Payer: COMMERCIAL

## 2018-11-05 ENCOUNTER — TELEPHONE (OUTPATIENT)
Dept: INFECTIOUS DISEASES | Facility: CLINIC | Age: 43
End: 2018-11-05

## 2018-11-05 VITALS
TEMPERATURE: 97.8 F | SYSTOLIC BLOOD PRESSURE: 151 MMHG | DIASTOLIC BLOOD PRESSURE: 90 MMHG | BODY MASS INDEX: 27.96 KG/M2 | WEIGHT: 200.5 LBS | HEART RATE: 70 BPM | OXYGEN SATURATION: 97 %

## 2018-11-05 DIAGNOSIS — T85.730A: ICD-10-CM

## 2018-11-05 DIAGNOSIS — G06.0 BRAIN ABSCESS: Primary | ICD-10-CM

## 2018-11-05 DIAGNOSIS — H53.10 SUBJECTIVE VISUAL DISTURBANCE: ICD-10-CM

## 2018-11-05 PROCEDURE — G0463 HOSPITAL OUTPT CLINIC VISIT: HCPCS | Mod: 27,ZF

## 2018-11-05 PROCEDURE — G0463 HOSPITAL OUTPT CLINIC VISIT: HCPCS

## 2018-11-05 PROCEDURE — G0463 HOSPITAL OUTPT CLINIC VISIT: HCPCS | Mod: ZF

## 2018-11-05 PROCEDURE — 40000114 ZZH STATISTIC NO CHARGE CLINIC VISIT

## 2018-11-05 PROCEDURE — 92083 EXTENDED VISUAL FIELD XM: CPT | Mod: ZF | Performed by: OPHTHALMOLOGY

## 2018-11-05 RX ORDER — GADOBUTROL 604.72 MG/ML
10 INJECTION INTRAVENOUS ONCE
Status: COMPLETED | OUTPATIENT
Start: 2018-11-05 | End: 2018-11-05

## 2018-11-05 RX ADMIN — GADOBUTROL 10 ML: 604.72 INJECTION INTRAVENOUS at 15:59

## 2018-11-05 ASSESSMENT — REFRACTION_WEARINGRX
OS_CYLINDER: +0.50
OD_CYLINDER: +0.75
OD_SPHERE: -2.00
OS_SPHERE: -2.25
OS_AXIS: 165
OD_AXIS: 065
OS_CYLINDER: +1.00
OS_AXIS: 049
OD_SPHERE: -3.00
OS_SPHERE: -1.50
OD_CYLINDER: SPHERE

## 2018-11-05 ASSESSMENT — CONF VISUAL FIELD
OD_NORMAL: 1
OS_INFERIOR_NASAL_RESTRICTION: 3
METHOD: COUNTING FINGERS

## 2018-11-05 ASSESSMENT — TONOMETRY
OD_IOP_MMHG: 15
IOP_METHOD: ICARE
OS_IOP_MMHG: 14

## 2018-11-05 ASSESSMENT — VISUAL ACUITY
OS_SC+: -2
OD_SC: 20/40
OS_SC: 20/20
OS_CC: 20/20
OD_SC+: +2
OD_CC: 20/25
METHOD: SNELLEN - LINEAR

## 2018-11-05 ASSESSMENT — PAIN SCALES - GENERAL: PAINLEVEL: MODERATE PAIN (4)

## 2018-11-05 NOTE — PROGRESS NOTES
It was a pleasure to see Bandar Olmedo today in Neurosurgery Clinic. She is a 43 year old female who was referred down from [facility] for what turned out to be an infected  shunt and brain abscess after placement of the shunt for a symptomatic cavum septum pellucidum. She returns for follow up today. She continues on antibiotics. She is seeing Dr. Sarkar tomorrow as well as Dr. Joseph in neuro-ophthalmology. She has intermittent HA. Otherwise stable neurologically.    Vitals:    11/05/18 1708   BP: 151/90   Pulse: 70   Temp: 97.8  F (36.6  C)   TempSrc: Oral   SpO2: 97%   Weight: 90.9 kg (200 lb 8 oz)     Body mass index is 27.96 kg/(m^2).  Moderate Pain (4)    Incisions well healed.    Pupils 5mm bilaterally and sluggish.   EOMI.  Otherwise neurologically intact.    Imaging: MRI shows continued improvement in the enhancement along previous shunt tract. I explained how the MRI is also a lagging indicator of treatment and that the persistent enhancement is expected. The cavum septum pellucidum has resolved. There may be some mild posterior ventriculomegaly as well as subtle enlargement of 3rd ventricle. The imaging was shown to the patient and reviewed in clinic.     Assessment: S/P  shunt placement with postoperative infection and brain abscess. Possible hydrocephalus.    Plan: She is seeing Dr. Sarkar and Jake tomorrow. I think she will likely be able to discontinue her antibiotics soon. I am looking for her results from Dr. Joseph and will also discuss with my colleague whether further intervention is warranted either via replacement of her  shunt, or possible ventriculoscopy and fenestration.

## 2018-11-05 NOTE — NURSING NOTE
Chief Complaints and History of Present Illnesses   Patient presents with     Vision Changes Os     visual field defect      HPI    Affected eye(s):  Left   Location:  Lateral   Symptoms:     Blurred vision   Decreased vision   No double vision   No redness   Photophobia   No eye discharge      Duration:  2 months   Frequency:  Constant       Do you have eye pain now?:  No      Comments:  Visual field defect left eye after shunt placement 9/1/2018.  It has improved since in the hospital            Kavita Maldonado, GIBSON, OSC

## 2018-11-05 NOTE — NURSING NOTE
"Oncology Rooming Note    November 5, 2018 5:09 PM   Bandar Olmedo is a 43 year old female who presents for:    No chief complaint on file.    Initial Vitals: /90  Pulse 70  Temp 97.8  F (36.6  C) (Oral)  SpO2 97% Estimated body mass index is 27.67 kg/(m^2) as calculated from the following:    Height as of 9/1/18: 1.803 m (5' 11\").    Weight as of 10/17/18: 90 kg (198 lb 6.4 oz). There is no height or weight on file to calculate BSA.  Moderate Pain (4) Comment: Data Unavailable   No LMP recorded.  Allergies reviewed: Yes  Medications reviewed: Yes    Medications: MEDICATION REFILLS NEEDED TODAY. Provider was notified.  Pharmacy name entered into EPIC: [id number] - [place], [place] - [address]    Clinical concerns: Patient is out of pain medications and would like refills for her headaches Dr. Salinas was notified.    5 minutes for nursing intake (face to face time)     NICOLAS CHOI RN                "

## 2018-11-05 NOTE — MR AVS SNAPSHOT
After Visit Summary   11/5/2018    Bandar Olmedo    MRN: [id number]           Patient Information     Date Of Birth          1975        Visit Information        Provider Department      11/5/2018 4:45 PM Antonio Salinas MD [facility]        Today's Diagnoses     Brain abscess    -  1    Cavum septum pellucidum (H)           Follow-ups after your visit        Who to contact     If you have questions or need follow up information about today's clinic visit or your schedule please contact [facility] directly at [phone].  Normal or non-critical lab and imaging results will be communicated to you by BrainCellshart, letter or phone within 4 business days after the clinic has received the results. If you do not hear from us within 7 days, please contact the clinic through [facility] or phone. If you have a critical or abnormal lab result, we will notify you by phone as soon as possible.  Submit refill requests through [facility] or call your pharmacy and they will forward the refill request to us. Please allow 3 business days for your refill to be completed.          Additional Information About Your Visit        MyChart Information     [facility] gives you secure access to your electronic health record. If you see a primary care provider, you can also send messages to your care team and make appointments. If you have questions, please call your primary care clinic.  If you do not have a primary care provider, please call [phone] and they will assist you.        Care EveryWhere ID     This is your Care EveryWhere ID. This could be used by other organizations to access your [facility] medical records  [id number]        Your Vitals Were     Pulse Temperature Pulse Oximetry BMI (Body Mass Index)          70 97.8  F (36.6  C) (Oral) 97% 27.96 kg/m2         Blood Pressure from Last 3 Encounters:   11/06/18 126/80   11/05/18 151/90   10/17/18 129/83    Weight from  Last 3 Encounters:   11/06/18 89.5 kg (197 lb 4.8 oz)   11/05/18 90.9 kg (200 lb 8 oz)   10/17/18 90 kg (198 lb 6.4 oz)              Today, you had the following     No orders found for display       Primary Care Provider Office Phone # Fax #    Amna Benitez [phone]       [facility] [address]  [facility] [address]        Equal Access to Services     ALVARO UMANZOR : Hadii aad ku hadasho Soomaali, waaxda luqadaha, qaybta kaalmada adeegyada, waxay idiin hayaan adeeg deidre rosas. So [facility] [phone].    ATENCIÓN: Si habla español, tiene a christopher disposición servicios gratuitos de asistencia lingüística. [facility] [phone].    We comply with applicable federal civil rights laws and [place] laws. We do not discriminate on the basis of race, color, national origin, age, disability, sex, sexual orientation, or gender identity.            Thank you!     Thank you for choosing [facility]  for your care. Our goal is always to provide you with excellent care. Hearing back from our patients is one way we can continue to improve our services. Please take a few minutes to complete the written survey that you may receive in the mail after your visit with us. Thank you!             Your Updated Medication List - Protect others around you: Learn how to safely use, store and throw away your medicines at www.disposemymeds.org.          This list is accurate as of 11/5/18 11:59 PM.  Always use your most recent med list.                   Brand Name Dispense Instructions for use Diagnosis    AMBIEN PO      Take 10 mg by mouth nightly as needed for sleep        KLONOPIN PO      Take 1 mg by mouth 3 times daily as needed for anxiety        levETIRAcetam 1000 MG Tabs     60 tablet    Take 1,000 mg by mouth 2 times daily    Infection of ventricular shunt, initial encounter (H)       norgestimate-ethinyl estradiol 0.25-35 MG-MCG per tablet    ORTHO-CYCLEN, SPRINTEC     Take 1  tablet by mouth daily        SYNTHROID PO      Take 75 mcg by mouth daily        TYLENOL PO      Take 500 mg by mouth every 6 hours as needed for mild pain or fever

## 2018-11-05 NOTE — DISCHARGE INSTRUCTIONS
MRI Contrast Discharge Instructions    The IV contrast you received today will pass out of your body in your  urine. This will happen in the next 24 hours. You will not feel this process.  Your urine will not change color.    Drink at least 4 extra glasses of water or juice today (unless your doctor  has restricted your fluids). This reduces the stress on your kidneys.  You may take your regular medicines.    If you are on dialysis: It is best to have dialysis today.    If you have a reaction: Most reactions happen right away. If you have  any new symptoms after leaving the hospital (such as hives or swelling),  call your hospital at the correct number below. Or call your family doctor.  If you have breathing distress or wheezing, call 911.    Special instructions: ***    I have read and understand the above information.    Signature:______________________________________ Date:___________    Staff:__________________________________________ Date:___________     Time:__________    [facility] Departments:    ___[facility]: [phone]  ___[facility]: [phone]  ___[facility]: [phone] ___[facility]: [phone]  ___[facility]: [phone]  ___[facility]: [phone]  ___[phone]  ___[facility]: [phone]  ___[phone]

## 2018-11-05 NOTE — TELEPHONE ENCOUNTER
[facility] Call Center    Phone Message    May a detailed message be left on voicemail: yes    Reason for Call: Other: [place] wondering if she needs to have labs drawn today and if she needs her pic line change appt. still tomorrow. Please call Pt back ASAP     Action Taken: Message routed to:  [facility]): ID

## 2018-11-05 NOTE — LETTER
11/5/2018       RE: Bandar Olmedo  [address]  [facility] [address]     Dear Colleague,    Thank you for referring your patient, Bandar Olmedo, to the [facility]. Please see a copy of my visit note below.    It was a pleasure to see Bandar Olmedo today in Neurosurgery Clinic. She is a 43 year old female who was referred down from [facility] for what turned out to be an infected  shunt and brain abscess after placement of the shunt for a symptomatic cavum septum pellucidum. She returns for follow up today. She continues on antibiotics. She is seeing Dr. Sarkar tomorrow as well as Dr. Joseph in neuro-ophthalmology. She has intermittent HA. Otherwise stable neurologically.    Vitals:    11/05/18 1708   BP: 151/90   Pulse: 70   Temp: 97.8  F (36.6  C)   TempSrc: Oral   SpO2: 97%   Weight: 90.9 kg (200 lb 8 oz)     Body mass index is 27.96 kg/(m^2).  Moderate Pain (4)    Incisions well healed.    Pupils 5mm bilaterally and sluggish.   EOMI.  Otherwise neurologically intact.    Imaging: MRI shows continued improvement in the enhancement along previous shunt tract. I explained how the MRI is also a lagging indicator of treatment and that the persistent enhancement is expected. The cavum septum pellucidum has resolved. There may be some mild posterior ventriculomegaly as well as subtle enlargement of 3rd ventricle. The imaging was shown to the patient and reviewed in clinic.     Assessment: S/P  shunt placement with postoperative infection and brain abscess. Possible hydrocephalus.    Plan: She is seeing Dr. Sarkar and Jake tomorrow. I think she will likely be able to discontinue her antibiotics soon. I am looking for her results from Dr. Joseph and will also discuss with my colleague whether further intervention is warranted either via replacement of her  shunt, or possible ventriculoscopy and fenestration.     Again, thank you for allowing me to participate in the care of your patient.       Sincerely,    Antonio Salinas MD       Patient

## 2018-11-06 ENCOUNTER — HOME INFUSION (PRE-WILLOW HOME INFUSION) (OUTPATIENT)
Dept: PHARMACY | Facility: CLINIC | Age: 43
End: 2018-11-06

## 2018-11-06 ENCOUNTER — OFFICE VISIT (OUTPATIENT)
Dept: OPHTHALMOLOGY | Facility: CLINIC | Age: 43
End: 2018-11-06
Attending: OPHTHALMOLOGY
Payer: COMMERCIAL

## 2018-11-06 ENCOUNTER — OFFICE VISIT (OUTPATIENT)
Dept: INFECTIOUS DISEASES | Facility: CLINIC | Age: 43
End: 2018-11-06
Attending: INTERNAL MEDICINE
Payer: COMMERCIAL

## 2018-11-06 VITALS
SYSTOLIC BLOOD PRESSURE: 126 MMHG | OXYGEN SATURATION: 99 % | HEIGHT: 71 IN | HEART RATE: 76 BPM | RESPIRATION RATE: 16 BRPM | TEMPERATURE: 98.2 F | DIASTOLIC BLOOD PRESSURE: 80 MMHG | WEIGHT: 197.3 LBS | BODY MASS INDEX: 27.62 KG/M2

## 2018-11-06 DIAGNOSIS — H53.10 SUBJECTIVE VISUAL DISTURBANCE: ICD-10-CM

## 2018-11-06 DIAGNOSIS — G06.0 BRAIN ABSCESS: Primary | ICD-10-CM

## 2018-11-06 DIAGNOSIS — G06.0 BRAIN ABSCESS: ICD-10-CM

## 2018-11-06 DIAGNOSIS — T85.618D SHUNT MALFUNCTION, SUBSEQUENT ENCOUNTER: ICD-10-CM

## 2018-11-06 DIAGNOSIS — H53.462 HOMONYMOUS HEMIANOPIA, LEFT: Primary | ICD-10-CM

## 2018-11-06 PROCEDURE — 92015 DETERMINE REFRACTIVE STATE: CPT | Mod: ZF | Performed by: TECHNICIAN/TECHNOLOGIST

## 2018-11-06 PROCEDURE — G0463 HOSPITAL OUTPT CLINIC VISIT: HCPCS | Mod: ZF | Performed by: TECHNICIAN/TECHNOLOGIST

## 2018-11-06 PROCEDURE — G0463 HOSPITAL OUTPT CLINIC VISIT: HCPCS | Mod: ZF

## 2018-11-06 ASSESSMENT — REFRACTION_MANIFEST
OS_AXIS: 095
OD_AXIS: 077
OD_ADD: +1.50
OD_SPHERE: -1.75
OS_ADD: +1.50
OS_CYLINDER: +0.75
OD_CYLINDER: +0.75
OS_SPHERE: -1.75

## 2018-11-06 ASSESSMENT — ENCOUNTER SYMPTOMS
WEAKNESS: 1
HEADACHES: 1
NERVOUS/ANXIOUS: 1
DISTURBANCES IN COORDINATION: 1
SPEECH CHANGE: 1
HOT FLASHES: 0
MUSCLE WEAKNESS: 1
NUMBNESS: 1
SEIZURES: 0
MYALGIAS: 1
EYE REDNESS: 0
PARALYSIS: 0
POOR WOUND HEALING: 0
DEPRESSION: 1
NECK PAIN: 1
STIFFNESS: 0
BACK PAIN: 1
MUSCLE CRAMPS: 0
DECREASED CONCENTRATION: 1
DOUBLE VISION: 0
TINGLING: 1
INSOMNIA: 1
EYE IRRITATION: 0
ARTHRALGIAS: 1
PANIC: 0
JOINT SWELLING: 1
MEMORY LOSS: 1
DIZZINESS: 1
EYE PAIN: 0
DECREASED LIBIDO: 1
LOSS OF CONSCIOUSNESS: 0
SKIN CHANGES: 0
NAIL CHANGES: 1
EYE WATERING: 0
TREMORS: 0

## 2018-11-06 ASSESSMENT — REFRACTION_WEARINGRX
OD_AXIS: 065
OS_SPHERE: -1.50
OD_CYLINDER: +0.75
OS_CYLINDER: +0.50
OD_CYLINDER: SPHERE
OS_SPHERE: -2.25
SPECS_TYPE: NEW
OS_CYLINDER: +1.00
SPECS_TYPE: OLD
OS_AXIS: 165
OD_SPHERE: -2.00
OD_SPHERE: -3.00
OS_AXIS: 049

## 2018-11-06 ASSESSMENT — TONOMETRY
IOP_METHOD: ICARE
OD_IOP_MMHG: 16
OS_IOP_MMHG: 17

## 2018-11-06 ASSESSMENT — CUP TO DISC RATIO
OD_RATIO: 0.1
OS_RATIO: 0.1

## 2018-11-06 ASSESSMENT — EXTERNAL EXAM - RIGHT EYE: OD_EXAM: NORMAL

## 2018-11-06 ASSESSMENT — SLIT LAMP EXAM - LIDS
COMMENTS: NORMAL
COMMENTS: NORMAL

## 2018-11-06 ASSESSMENT — VISUAL ACUITY
OS_SC+: -2
OS_SC: 20/20
METHOD: SNELLEN - LINEAR
OD_SC: 20/40
OD_SC+: +2

## 2018-11-06 ASSESSMENT — PAIN SCALES - GENERAL: PAINLEVEL: SEVERE PAIN (6)

## 2018-11-06 ASSESSMENT — EXTERNAL EXAM - LEFT EYE: OS_EXAM: NORMAL

## 2018-11-06 NOTE — NURSING NOTE
PICC line pulled without complications, occlusive dressing and pressure wrap (coban) applied, site assessed within normal limits, no bleeding or infection observed, catheter intact, Pt left in supine position for 15 min following removal. Pt education given along with home care instructions. Pt left in care of .  Radha Cruz RN

## 2018-11-06 NOTE — LETTER
11/6/2018       RE: Bandar Olmedo  [address]  [facility] [address]     Dear Colleague,    Thank you for referring your patient, Bandar Olmedo, to the [facility] AND INFECTIOUS DISEASES at [facility]. Please see a copy of my visit note below.    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     CSF on 9/1/18 grew MSSA .  shunt was removed and EVD was placed on 9/2/18.  serial CSF fluid cx  from EVD was donweand last positive cx was on 9/2. subsequent daily cx from 9/3-9/10 were negative. EVD was removed on 9/10/10 and no  shunt was needed . she was discharged on 9/12/18.      she has since returned home and still on Nafcillin. Rifampin was discontinued more than 3 weeks ago.   she denies fever, chills, neck stiffness, nausea,vomiting, diarrhea. she still c/o frontal headaches but her vision is slowly getting better. she is able to walk , no falls since her discharge from hospital. she has not seen her PCP since d/c. she complains of chronic arthritic pain.  she is here with her boyfriend . they live in [place], [place].     Lab: CRP 0.8, WBC 4Cr 0.8, Normal AST, ALT (10/8/18)     visit 11/7/18   she is here with her significant other. she still has retroorbital discomfort . no fever, chills, nausea, vomiting, diarrhea. appetite has been good. no falls  at home. mental status at baseline. no confusion. starting PT/OT. will be seeing opthalmologist today. saw Dr Salinas yesterday. no pain around the PICC site.     ROS:  CONSTITUTIONAL:NEGATIVE for fever, chills, change in weight  INTEGUMENTARY/SKIN: NEGATIVE for worrisome rashes, moles or lesions  EYES: NEGATIVE for vision changes or irritation  ENT/MOUTH: NEGATIVE for ear, mouth and throat problems  RESP:NEGATIVE for significant cough or SOB  CV: NEGATIVE for chest pain, palpitations or peripheral edema  GI: NEGATIVE for nausea, abdominal pain, heartburn, or change in bowel habits  : NEGATIVE for urinary frequency, hematuria or dysuria  MUSCULOSKELETAL: NEGATIVE for significant arthralgias or myalgia  NEURO: see HPI   ENDOCRINE: NEGATIVE for temperature intolerance, skin/hair changes  HEME/ALLERGY/IMMUNE: NEGATIVE for bleeding problems  PSYCHIATRIC: NEGATIVE for changes in mood or affect    Problem list and histories reviewed & adjusted, as indicated.  Additional history: as documented    PAST SURGICAL HISTORY:  Patient  has a past surgical history that includes Optical tracking system implant shunt ventriculoperitoneal (Right, 9/2/2018) and Remove shunt ventriculoperitoneal (Right, 9/2/2018).    CURRENT MEDICATIONS:  Current Outpatient Prescriptions   Medication Sig Dispense Refill     Acetaminophen (TYLENOL PO) Take 500 mg by mouth every 6 hours as needed for mild pain or fever       ClonazePAM (KLONOPIN PO) Take 1 mg by mouth 3 times daily as needed for anxiety       levETIRAcetam 1000 MG TABS Take 1,000 mg by mouth 2 times daily 60 tablet 1     Levothyroxine Sodium (SYNTHROID PO) Take 75 mcg by mouth daily       norgestimate-ethinyl estradiol (ORTHO-CYCLEN, SPRINTEC) 0.25-35 MG-MCG per tablet Take 1 tablet by mouth daily       Zolpidem Tartrate (AMBIEN PO) Take 10 mg by mouth nightly as needed for sleep       ALLERGY:  No Known Allergies    OBJECTIVE:                                                    /80  "(BP Location: Right arm, Patient Position: Sitting, Cuff Size: Adult Large)  Pulse 76  Temp 98.2  F (36.8  C) (Oral)  Resp 16  Ht 1.803 m (5' 11\")  Wt 89.5 kg (197 lb 4.8 oz)  SpO2 99%  BMI 27.52 kg/m2 Body mass index is 27.52 kg/(m^2).   GENERAL:  alert, oriented and no distress  EYES: Eyes grossly normal to inspection, PERRL and conjunctivae and sclerae normal  HENT:  mouth without ulcers or lesions  NECK: suno adenopathy, no asymmetry, masses, or scars and thyroid normal to palpation  RESP: lungs clear to auscultation - no rales, rhonchi or wheezes  CV: regular rate and rhythm, normal S1 S2, no murmur, click or rub, no peripheral edema   ABDOMEN: soft, nontender, no hepatosplenomegaly, no masses and bowel sounds normal  MS: no gross musculoskeletal defects noted, no edema  SKIN: no suspicious lesions or rashes  NEURO: Normal strength and tone, mentation intact and speech normal, gait - steady   PSYCH: mentation appears normal, affect normal  LYMPH: no cervical, supraclavicular, axillary  adenopathy  PICC line : left arm - ok, non tender        ASSESSMENT AND PLAN:                                                      43 year old year old female with ADHD, OA, depression, anxiety and  vellum interpositum intracranial arachnoid cyst s/p ventriculoperiotoneal shunt 7/18/2018 admitted on 9/1/2018 with severe headaches, unresponsiveness and noted to have ventriculitis and edema due to  shunt placed at OSH.   1. Arachnoid cyst detected 7/2018 s/p EVD and ultimately VPS on 7/18/18 at [facility] complicated by ventriculitis -  Staph aureus MSSA + small abscesses seen on MRI 9/2/18  - S/p removal VPS and placement EVD 9/2/18.  - CT brain wo contrast 9/4 comparison 9/3/18   1. Stable positioning of right frontal approach ventriculostomy catheter, without significant change in size of the ventricles since prior exam.  2. Continued vasogenic edema involving predominantly the white matter of the right " occipital parietal distribution, not significantly changed from prior exam.  3. No evidence of intracranial hemorrhage.     CT brain wo contrast 9/3/18  Impression:    1. Rim-enhancing fluid collection seen on MRI are not demonstrated on this CT examination there is continued vasogenic edema involving predominantly the right matter of the right occipital parietal  distribution, not significantly changed from 9/2/2018. Continued mass effect and effacement of the right occipital horn.  2. Stable positioning of right frontal approach ventriculostomy catheter, without significant change in size of the ventricles since prior exam.  3. No evidence for intracranial hemorrhage.      - MRI brain w and wo contrast 9/2/18   Impression:  1. Small rim-enhancing fluid collections in the posterior right cerebral hemisphere along the ventriculostomy catheter tract, most likely small abscess along a catheter tract.  2. Confluent abnormal T2 signal surrounding these collections is favored edema.  MRI brain w/wo contrast 10/7/18 ( [facility] )   FINDINGS: Since comparison exam, the RIGHT occipital approach  shunt has been removed. Improved T2/FLAIR hyperintense signal abnormality, with decreased T2/FLAIR hyperintense signal within the splenium of the corpus callosum and about the posterior aspect of the RIGHT lateral ventricle. T2/FLAIR hyperintense signal abnormality continues to involve the paramedian RIGHT posterior parietal lobe as well as the RIGHT and central aspects of the splenium. Similarly, the previously described T2 hypointense lesions around the previously noted  shunt catheter have decreased in size. There remains two small peripherally-enhancing foci, one on axial post gadolinium image 16 and one on axial post gadolinium image 17. These measure approximately 1-2 mm and 3-4 mm, respectively. Lack of postcontrast imaging previously limits comparison, but these are likely improved. Otherwise, there is enhancement and signal  abnormality along the course of the prior RIGHT occipital approach  shunt catheter, without additional discrete peripherally enhancing abscess. Small focus of restricted diffusion in the RIGHT paramedian parietal lobe measures 4 mm (diffusion image 17) and is unchanged since comparison. Small focus of susceptibility artifact/hemosiderin deposition along the posterior aspect of this area of restricted diffusion. Additional small amount of linear susceptibility tracking along the prior RIGHT occipital  shunt tract. Stable focus of restricted diffusion in the central aspect of the splenium of the corpus callosum (diffusion image 19). Decreased restricted diffusion in the RIGHT fornix. Stable focus of restricted diffusion in the LEFT fornix. Resolved foci of mild diffusion restriction in the dependent LEFT lateral ventricle and LEFT occipital horn. Improved nonenhancing T2 hyperintense signal abnormality along the anterior aspect of the LEFT frontal horn of the lateral ventricle. Stable T2 nonenhancing/FLAIR signal abnormality along the course of a prior RIGHT frontal approach  catheter tract. Stable hydrocephalus involving the lateral and third ventricles. Stable appearance of the anterior septum pellucidum and cavum septum interpositum cyst. Major intracranial flow voids are preserved. LEFT dominant vertebrobasilar system. No evidence of acute sinusitis. Minimal mucosal thickening in the paranasal sinuses. No mastoid effusion.    IMPRESSION: Improving sequela related to RIGHT occipital approach  shunt catheter infection, as described above.    MRI brain w/wo contrast 11/5/18  Impression:  1. Slightly enhancement and vasogenic edema in the right occipital lobe compared to 10/7/2018, and significantly decreased compared to  9/11/2018, in a patient with a history of 2 intracranial abscesses.   2. No evident hydrocephalus in a patient with ventriculostomy.    2. Depression/anxiety  3. ADHD  4. PICC placed on  9/4/18  5. CRP normalized since 10/8/18, normal WBC     Recommendations:   - stop continue Nafcillin - s/p > 8 weeks of Nafcillin. [facility] infusion notified.   - remove PICC   - advised to take tylenol and avoid narcotic if possible . cautious with Ibuprofen use.   - ok to get flu shot. she will get it at her regular clinic   - discussed lab results     advised to call if she has any questions/ concerns     Rei Austin MD, M.Med.Sc  Division of Infectious Diseases and International Medicine  [facility]

## 2018-11-06 NOTE — LETTER
11/6/2018      RE: Bandar Olmedo  [address]  [facility] [address]       INFECTIOUS DISEASES PROGRESS NOTE    Infectious Diseases Clinic     SUBJECTIVE:                                                      Bandar Olmedo is a 43 year old female who presents to clinic today for the following health issues: intracranial abscess , ventricultis, meningitis  ( MSSA)    43 year old year old female with ADHD, OA, depression, anxiety and  vellum interpositum intracranial arachnoid cyst s/p ventriculoperiotoneal shunt 7/18/2018, post op seizure on keppra transefrred from [facility] to [facility] on 9/1/2018  after a  fall with worsening headaches,  tenderness over the shunt  and noted to have purulent drainage from the surgical site.     CSF on 9/1/18 grew MSSA .  shunt was removed and EVD was placed on 9/2/18.  serial CSF fluid cx  from EVD was donweand last positive cx was on 9/2. subsequent daily cx from 9/3-9/10 were negative. EVD was removed on 9/10/10 and no  shunt was needed . she was discharged on 9/12/18.      she has since returned home and still on Nafcillin. Rifampin was discontinued more than 3 weeks ago.   she denies fever, chills, neck stiffness, nausea,vomiting, diarrhea. she still c/o frontal headaches but her vision is slowly getting better. she is able to walk , no falls since her discharge from hospital. she has not seen her PCP since d/c. she complains of chronic arthritic pain.  she is here with her boyfriend . they live in [place], [place].     Lab: CRP 0.8, WBC 4Cr 0.8, Normal AST, ALT (10/8/18)     visit 11/7/18   she is here with her significant other. she still has retroorbital discomfort . no fever, chills, nausea, vomiting, diarrhea. appetite has been good. no falls at home. mental status at baseline. no confusion. starting PT/OT. will be seeing opthalmologist today. saw Dr Salinas yesterday. no pain around the PICC site.     ROS:  CONSTITUTIONAL:NEGATIVE for fever, chills, change in  "weight  INTEGUMENTARY/SKIN: NEGATIVE for worrisome rashes, moles or lesions  EYES: NEGATIVE for vision changes or irritation  ENT/MOUTH: NEGATIVE for ear, mouth and throat problems  RESP:NEGATIVE for significant cough or SOB  CV: NEGATIVE for chest pain, palpitations or peripheral edema  GI: NEGATIVE for nausea, abdominal pain, heartburn, or change in bowel habits  : NEGATIVE for urinary frequency, hematuria or dysuria  MUSCULOSKELETAL: NEGATIVE for significant arthralgias or myalgia  NEURO: see HPI   ENDOCRINE: NEGATIVE for temperature intolerance, skin/hair changes  HEME/ALLERGY/IMMUNE: NEGATIVE for bleeding problems  PSYCHIATRIC: NEGATIVE for changes in mood or affect    Problem list and histories reviewed & adjusted, as indicated.  Additional history: as documented    PAST SURGICAL HISTORY:  Patient  has a past surgical history that includes Optical tracking system implant shunt ventriculoperitoneal (Right, 9/2/2018) and Remove shunt ventriculoperitoneal (Right, 9/2/2018).    CURRENT MEDICATIONS:  Current Outpatient Prescriptions   Medication Sig Dispense Refill     Acetaminophen (TYLENOL PO) Take 500 mg by mouth every 6 hours as needed for mild pain or fever       ClonazePAM (KLONOPIN PO) Take 1 mg by mouth 3 times daily as needed for anxiety       levETIRAcetam 1000 MG TABS Take 1,000 mg by mouth 2 times daily 60 tablet 1     Levothyroxine Sodium (SYNTHROID PO) Take 75 mcg by mouth daily       norgestimate-ethinyl estradiol (ORTHO-CYCLEN, SPRINTEC) 0.25-35 MG-MCG per tablet Take 1 tablet by mouth daily       Zolpidem Tartrate (AMBIEN PO) Take 10 mg by mouth nightly as needed for sleep       ALLERGY:  No Known Allergies    OBJECTIVE:                                                    /80 (BP Location: Right arm, Patient Position: Sitting, Cuff Size: Adult Large)  Pulse 76  Temp 98.2  F (36.8  C) (Oral)  Resp 16  Ht 1.803 m (5' 11\")  Wt 89.5 kg (197 lb 4.8 oz)  SpO2 99%  BMI 27.52 kg/m2 Body mass " index is 27.52 kg/(m^2).   GENERAL:  alert, oriented and no distress  EYES: Eyes grossly normal to inspection, PERRL and conjunctivae and sclerae normal  HENT:  mouth without ulcers or lesions  NECK: suno adenopathy, no asymmetry, masses, or scars and thyroid normal to palpation  RESP: lungs clear to auscultation - no rales, rhonchi or wheezes  CV: regular rate and rhythm, normal S1 S2, no murmur, click or rub, no peripheral edema   ABDOMEN: soft, nontender, no hepatosplenomegaly, no masses and bowel sounds normal  MS: no gross musculoskeletal defects noted, no edema  SKIN: no suspicious lesions or rashes  NEURO: Normal strength and tone, mentation intact and speech normal, gait - steady   PSYCH: mentation appears normal, affect normal  LYMPH: no cervical, supraclavicular, axillary  adenopathy  PICC line : left arm - ok, non tender        ASSESSMENT AND PLAN:                                                      43 year old year old female with ADHD, OA, depression, anxiety and  vellum interpositum intracranial arachnoid cyst s/p ventriculoperiotoneal shunt 7/18/2018 admitted on 9/1/2018 with severe headaches, unresponsiveness and noted to have ventriculitis and edema due to  shunt placed at OSH.   1. Arachnoid cyst detected 7/2018 s/p EVD and ultimately VPS on 7/18/18 at [facility] complicated by ventriculitis -  Staph aureus MSSA + small abscesses seen on MRI 9/2/18  - S/p removal VPS and placement EVD 9/2/18.  - CT brain wo contrast 9/4 comparison 9/3/18   1. Stable positioning of right frontal approach ventriculostomy catheter, without significant change in size of the ventricles since prior exam.  2. Continued vasogenic edema involving predominantly the white matter of the right occipital parietal distribution, not significantly changed from prior exam.  3. No evidence of intracranial hemorrhage.     CT brain wo contrast 9/3/18  Impression:    1. Rim-enhancing fluid collection seen on MRI are not  demonstrated on this CT examination there is continued vasogenic edema involving predominantly the right matter of the right occipital parietal  distribution, not significantly changed from 9/2/2018. Continued mass effect and effacement of the right occipital horn.  2. Stable positioning of right frontal approach ventriculostomy catheter, without significant change in size of the ventricles since prior exam.  3. No evidence for intracranial hemorrhage.      - MRI brain w and wo contrast 9/2/18   Impression:  1. Small rim-enhancing fluid collections in the posterior right cerebral hemisphere along the ventriculostomy catheter tract, most likely small abscess along a catheter tract.  2. Confluent abnormal T2 signal surrounding these collections is favored edema.  MRI brain w/wo contrast 10/7/18 ( [facility] )   FINDINGS: Since comparison exam, the RIGHT occipital approach  shunt has been removed. Improved T2/FLAIR hyperintense signal abnormality, with decreased T2/FLAIR hyperintense signal within the splenium of the corpus callosum and about the posterior aspect of the RIGHT lateral ventricle. T2/FLAIR hyperintense signal abnormality continues to involve the paramedian RIGHT posterior parietal lobe as well as the RIGHT and central aspects of the splenium. Similarly, the previously described T2 hypointense lesions around the previously noted  shunt catheter have decreased in size. There remains two small peripherally-enhancing foci, one on axial post gadolinium image 16 and one on axial post gadolinium image 17. These measure approximately 1-2 mm and 3-4 mm, respectively. Lack of postcontrast imaging previously limits comparison, but these are likely improved. Otherwise, there is enhancement and signal abnormality along the course of the prior RIGHT occipital approach  shunt catheter, without additional discrete peripherally enhancing abscess. Small focus of restricted diffusion in the RIGHT paramedian parietal lobe  measures 4 mm (diffusion image 17) and is unchanged since comparison. Small focus of susceptibility artifact/hemosiderin deposition along the posterior aspect of this area of restricted diffusion. Additional small amount of linear susceptibility tracking along the prior RIGHT occipital  shunt tract. Stable focus of restricted diffusion in the central aspect of the splenium of the corpus callosum (diffusion image 19). Decreased restricted diffusion in the RIGHT fornix. Stable focus of restricted diffusion in the LEFT fornix. Resolved foci of mild diffusion restriction in the dependent LEFT lateral ventricle and LEFT occipital horn. Improved nonenhancing T2 hyperintense signal abnormality along the anterior aspect of the LEFT frontal horn of the lateral ventricle. Stable T2 nonenhancing/FLAIR signal abnormality along the course of a prior RIGHT frontal approach  catheter tract. Stable hydrocephalus involving the lateral and third ventricles. Stable appearance of the anterior septum pellucidum and cavum septum interpositum cyst. Major intracranial flow voids are preserved. LEFT dominant vertebrobasilar system. No evidence of acute sinusitis. Minimal mucosal thickening in the paranasal sinuses. No mastoid effusion.    IMPRESSION: Improving sequela related to RIGHT occipital approach  shunt catheter infection, as described above.    MRI brain w/wo contrast 11/5/18  Impression:  1. Slightly enhancement and vasogenic edema in the right occipital lobe compared to 10/7/2018, and significantly decreased compared to  9/11/2018, in a patient with a history of 2 intracranial abscesses.   2. No evident hydrocephalus in a patient with ventriculostomy.    2. Depression/anxiety  3. ADHD  4. PICC placed on 9/4/18  5. CRP normalized since 10/8/18, normal WBC     Recommendations:   - stop continue Nafcillin - s/p > 8 weeks of Nafcillin. [facility] infusion notified.   - remove PICC   - advised to take tylenol and avoid  narcotic if possible . cautious with Ibuprofen use.   - ok to get flu shot. she will get it at her regular clinic   - discussed lab results     advised to call if she has any questions/ concerns     Rei Austin MD, M.Med.Sc  Division of Infectious Diseases and International Medicine  [facility]

## 2018-11-06 NOTE — PROGRESS NOTES
Assessment & Plan     Bandar Olmedo is a 43 year old female with the following diagnoses:   1. Subjective visual disturbance       The patient left before being seen by me.           Attending Physician Attestation:  Complete documentation of historical and exam elements from today's encounter can be found in the full encounter summary report (not reduplicated in this progress note).  I personally obtained the chief complaint(s) and history of present illness.  I confirmed and edited as necessary the review of systems, past medical/surgical history, family history, social history, and examination findings as documented by others; and I examined the patient myself.  I personally reviewed the relevant tests, images, and reports as documented above.  I formulated and edited as necessary the assessment and plan and discussed the findings and management plan with the patient and family. - Michael Joseph MD

## 2018-11-06 NOTE — NURSING NOTE
Chief Complaints and History of Present Illnesses   Patient presents with     Follow Up For     from yesterday's appointment; subjectivev visual disturbance     HPI    Symptoms:              Comments:  Patient was here yesterday, left without being seen by attending.     Following up for visual field defect left eye after shunt placement 9/1/2018.  It has improved since in the hospital.  Denies double vision.   JULIOCESAR Cortez 11/6/2018 11:55 AM

## 2018-11-06 NOTE — MR AVS SNAPSHOT
"              After Visit Summary   11/6/2018    Bandar Olmedo    MRN: [id number]           Patient Information     Date Of Birth          1975        Visit Information        Provider Department      11/6/2018 10:00 AM Rei Austin MD [facility] and Infectious Diseases        Today's Diagnoses     Brain abscess    -  1       Follow-ups after your visit        Follow-up notes from your care team     Return if symptoms worsen or fail to improve.      Who to contact     If you have questions or need follow up information about today's clinic visit or your schedule please contact [facility] AND INFECTIOUS DISEASES directly at [phone].  Normal or non-critical lab and imaging results will be communicated to you by MyChart, letter or phone within 4 business days after the clinic has received the results. If you do not hear from us within 7 days, please contact the clinic through MyChart or phone. If you have a critical or abnormal lab result, we will notify you by phone as soon as possible.  Submit refill requests through Chrom[employer] or call your pharmacy and they will forward the refill request to us. Please allow 3 business days for your refill to be completed.          Additional Information About Your Visit        MyChart Information     [employer] gives you secure access to your electronic health record. If you see a primary care provider, you can also send messages to your care team and make appointments. If you have questions, please call your primary care clinic.  If you do not have a primary care provider, please call [phone] and they will assist you.        Care EveryWhere ID     This is your Care EveryWhere ID. This could be used by other organizations to access your [place] medical records  [id number]        Your Vitals Were     Pulse Temperature Respirations Height Pulse Oximetry BMI (Body Mass Index)    76 98.2  F (36.8  C) (Oral) 16 1.803 m (5' 11\") " 99% 27.52 kg/m2       Blood Pressure from Last 3 Encounters:   11/06/18 126/80   11/05/18 151/90   10/17/18 129/83    Weight from Last 3 Encounters:   11/06/18 89.5 kg (197 lb 4.8 oz)   11/05/18 90.9 kg (200 lb 8 oz)   10/17/18 90 kg (198 lb 6.4 oz)              We Performed the Following     PICC removal        Primary Care Provider Office Phone # Fax #    Amna Benitez [phone]       [facility] [address]  [place] [address]        Equal Access to Services     RICK UMANZOR : Hadii judy Cosme, waaxda luqadaha, qaybta kaalmada adeildayada, hemanth draper . So [facility] [phone].    ATENCIÓN: Si habla español, tiene a christopher disposición servicios gratuitos de asistencia lingüística. [facility] [phone].    We comply with applicable federal civil rights laws and [place] laws. We do not discriminate on the basis of race, color, national origin, age, disability, sex, sexual orientation, or gender identity.            Thank you!     Thank you for choosing [facility] AND INFECTIOUS DISEASES  for your care. Our goal is always to provide you with excellent care. Hearing back from our patients is one way we can continue to improve our services. Please take a few minutes to complete the written survey that you may receive in the mail after your visit with us. Thank you!             Your Updated Medication List - Protect others around you: Learn how to safely use, store and throw away your medicines at www.disposemymeds.org.          This list is accurate as of 11/6/18  1:27 PM.  Always use your most recent med list.                   Brand Name Dispense Instructions for use Diagnosis    AMBIEN PO      Take 10 mg by mouth nightly as needed for sleep        KLONOPIN PO      Take 1 mg by mouth 3 times daily as needed for anxiety        levETIRAcetam 1000 MG Tabs     60 tablet    Take 1,000 mg by mouth 2 times daily    Infection of  ventricular shunt, initial encounter (H)       norgestimate-ethinyl estradiol 0.25-35 MG-MCG per tablet    ORTHO-CYCLEN, SPRINTEC     Take 1 tablet by mouth daily        SYNTHROID PO      Take 75 mcg by mouth daily        TYLENOL PO      Take 500 mg by mouth every 6 hours as needed for mild pain or fever

## 2018-11-06 NOTE — LETTER
2018         RE:  :  MRN: Bandar Olmedo  1975  [id number]     Dear Dr. Salinas:     Thank you for asking me to see your very pleasant patient, Bandar Olmedo, in neuro-ophthalmic consultation.  I would like to thank you for sending your records and I have summarized them in the history of present illness. She presented with her spouse who provided additional history.  My assessment and plan are below.  For further details, please see my attached clinic note.      Assessment & Plan     Bandar Olmedo is a 43 year old female with the following diagnoses:   1. Homonymous hemianopia, left    2. Subjective visual disturbance    3. Brain abscess    4. Shunt malfunction, subsequent encounter       Bandar Olmedo presents to neuroophthalmology clinic for consultation. She has a history of a  shunt placement on 18 for a symptomatic cavum septum pellucidum (sub-cranial arachnoid cyst). She presented on 18 after a fall with worsening headaches with workup revealing shunt infection and brain abscess on 18. CSF grew MSSA.  shunt donte removed and EVD was placed on 18. Serial CSF fluid was cultured with last positive culture on 18, 9/3-9/10 were negative. The EVD was removed on 9/10/18 and no  shunt was placed. Today is the first day off of antibiotics.     Reports long history of headaches since 80s, and was told they were migraines. She was having worsening headaches when they found the cyst initially. The headaches improved with the  shunt. Now that the shunt is removed she reports worsening of headaches. It is described as a constant dull pressure along her temporal. It can last hours or all day. It happens more frequently now occurring daily. She also has light and noise sensitivity which makes the headaches worse. She takes tylenol which helps it feel better a little bit. She reports that at times she can hear her heart beat when her headaches are very bad. She also thinks  that her vision is not as clear as it used to be. She denies TVOs, double vision, new floaters, or flashes.     Past ocular history of refractive error. Mother with glaucoma. Past medical history significant for cavum septum pellucidum, depression, ADHD.     On exam, visual acuity is 20/40 in the right eye and 20/20 in the left eye. Intraocular pressures are normal. Extraocular movements are full. Pupillary exam is normal, and color vision is full. Anterior slit lamp examination is normal. Fundus exam is notable for normal Visual field shows right eye with inferior nasal depression and left eye with inferior temporal depression.      It is my impression that this patient does not have evidence of increased intracranial pressure.  She has spontaneous venous pulsations which indicates that she has normal intracranial pressure.  I reviewed her MRI images personally.  There was significant dilation of the ventricles from the cyst initially. This has improved significantly.  There are T2 hyperintensities in the occipital lobe white matter on the right.  Vision improves to 20/20 with new glasses prescription. She does have a left peripapillary hemorrhage of unknown etiology. IOP is normal. Recommend that she has repeat dilated eye exam in 6-8 weeks that can be done by local ophthalmologist.  She will arrange this to happen.      Her visual field defect is inferior and does not cross the horizontal.  She is legally allowed to drive from a visual standpoint.     Again, thank you for allowing me to participate in the care of your patient.      Sincerely,    Michael Joseph MD  Professor, Neuro-Ophthalmology  Department of Ophthalmology and Visual Neurosciences  [facility]    CC: Rei Austin MD  [address]  [facility] [address]  VIA In Yael Benitez NP  [facility]  [address] [facility] [address]  VIA Facsimile: [phone]     Antonio Salinas MD  [id number]  [facility] [address]  VIA In Basket       DX = homonymous hemianopia, brain abscess, shunt malfunction

## 2018-11-06 NOTE — MR AVS SNAPSHOT
After Visit Summary   11/6/2018    Bandar Olmedo    MRN: [id number]           Patient Information     Date Of Birth          1975        Visit Information        Provider Department      11/6/2018 12:15 PM Michael Joseph MD [facility]        Today's Diagnoses     Homonymous hemianopia, left    -  1    Subjective visual disturbance        Brain abscess        Shunt malfunction, subsequent encounter           Follow-ups after your visit        Who to contact     Please call your clinic at [phone] to:    Ask questions about your health    Make or cancel appointments    Discuss your medicines    Learn about your test results    Speak to your doctor            Additional Information About Your Visit        MyChart Information     [employer] gives you secure access to your electronic health record. If you see a primary care provider, you can also send messages to your care team and make appointments. If you have questions, please call your primary care clinic.  If you do not have a primary care provider, please call [phone] and they will assist you.      [employer]o is an electronic gateway that provides easy, online access to your medical records. With [employer], you can request a clinic appointment, read your test results, renew a prescription or communicate with your care team.     To access your existing account, please contact your [facility] or call [phone] for assistance.        Care EveryWhere ID     This is your Care EveryWhere ID. This could be used by other organizations to access your [place] medical records  [id number]         Blood Pressure from Last 3 Encounters:   11/06/18 126/80   11/05/18 151/90   10/17/18 129/83    Weight from Last 3 Encounters:   11/06/18 89.5 kg (197 lb 4.8 oz)   11/05/18 90.9 kg (200 lb 8 oz)   10/17/18 90 kg (198 lb 6.4 oz)              We Performed the Following     IOP Measurement        Primary Care Provider  Office Phone # Fax #    Amna Benitez [phone]       [facility] [address]  [facility] [address]        Equal Access to Services     ALVARO UMANZOR : Hadii judy ku hadboo Sonickieali, waaxda luqadaha, qaybta kaalmada adeegyada, hemanth ashford hubertilda jiang laCarladominique rosas. So [facility] [phone].    ATENCIÓN: Si habla español, tiene a christopher disposición servicios gratuitos de asistencia lingüística. Maria Eugenia al [phone].    We comply with applicable federal civil rights laws and [place] laws. We do not discriminate on the basis of race, color, national origin, age, disability, sex, sexual orientation, or gender identity.            Thank you!     Thank you for choosing EYE CLINIC  for your care. Our goal is always to provide you with excellent care. Hearing back from our patients is one way we can continue to improve our services. Please take a few minutes to complete the written survey that you may receive in the mail after your visit with us. Thank you!             Your Updated Medication List - Protect others around you: Learn how to safely use, store and throw away your medicines at www.disposemymeds.org.          This list is accurate as of 11/6/18  2:03 PM.  Always use your most recent med list.                   Brand Name Dispense Instructions for use Diagnosis    AMBIEN PO      Take 10 mg by mouth nightly as needed for sleep        KLONOPIN PO      Take 1 mg by mouth 3 times daily as needed for anxiety        levETIRAcetam 1000 MG Tabs     60 tablet    Take 1,000 mg by mouth 2 times daily    Infection of ventricular shunt, initial encounter (H)       norgestimate-ethinyl estradiol 0.25-35 MG-MCG per tablet    ORTHO-CYCLEN, SPRINTEC     Take 1 tablet by mouth daily        SYNTHROID PO      Take 75 mcg by mouth daily        TYLENOL PO      Take 500 mg by mouth every 6 hours as needed for mild pain or fever

## 2018-11-06 NOTE — NURSING NOTE
"Chief Complaint   Patient presents with     RECHECK     Brain abscess       /80 (BP Location: Right arm, Patient Position: Sitting, Cuff Size: Adult Large)  Pulse 76  Temp 98.2  F (36.8  C) (Oral)  Resp 16  Ht 1.803 m (5' 11\")  Wt 89.5 kg (197 lb 4.8 oz)  SpO2 99%  BMI 27.52 kg/m2    Vane Mcgill [facility]  11/6/2018 10:10 AM        "

## 2018-11-06 NOTE — PROGRESS NOTES
INFECTIOUS DISEASES PROGRESS NOTE    Infectious Diseases Clinic     SUBJECTIVE:                                                      Bandar Olmedo is a 43 year old female who presents to clinic today for the following health issues: intracranial abscess , ventricultis, meningitis  ( MSSA)    43 year old year old female with ADHD, OA, depression, anxiety and  vellum interpositum intracranial arachnoid cyst s/p ventriculoperiotoneal shunt 7/18/2018, post op seizure on keppra [facility]frred [facility] to [facility] on 9/1/2018  after a  fall with worsening headaches,  tenderness over the shunt  and noted to have purulent drainage from the surgical site.     CSF on 9/1/18 grew MSSA .  shunt was removed and EVD was placed on 9/2/18.  serial CSF fluid cx  from EVD was donweand last positive cx was on 9/2. subsequent daily cx from 9/3-9/10 were negative. EVD was removed on 9/10/10 and no  shunt was needed . she was discharged on 9/12/18.      she has since returned home and still on Nafcillin. Rifampin was discontinued more than 3 weeks ago.   she denies fever, chills, neck stiffness, nausea,vomiting, diarrhea. she still c/o frontal headaches but her vision is slowly getting better. she is able to walk , no falls since her discharge from hospital. she has not seen her PCP since d/c. she complains of chronic arthritic pain.  she is here with her boyfriend . they live in [place], [place].     Lab: CRP 0.8, WBC 4Cr 0.8, Normal AST, ALT (10/8/18)     visit 11/7/18   she is here with her significant other. she still has retroorbital discomfort . no fever, chills, nausea, vomiting, diarrhea. appetite has been good. no falls at home. mental status at baseline. no confusion. starting PT/OT. will be seeing opthalmologist today. saw Dr Salinas yesterday. no pain around the PICC site.     ROS:  CONSTITUTIONAL:NEGATIVE for fever, chills, change in weight  INTEGUMENTARY/SKIN: NEGATIVE for worrisome rashes, moles or lesions  EYES:  "NEGATIVE for vision changes or irritation  ENT/MOUTH: NEGATIVE for ear, mouth and throat problems  RESP:NEGATIVE for significant cough or SOB  CV: NEGATIVE for chest pain, palpitations or peripheral edema  GI: NEGATIVE for nausea, abdominal pain, heartburn, or change in bowel habits  : NEGATIVE for urinary frequency, hematuria or dysuria  MUSCULOSKELETAL: NEGATIVE for significant arthralgias or myalgia  NEURO: see HPI   ENDOCRINE: NEGATIVE for temperature intolerance, skin/hair changes  HEME/ALLERGY/IMMUNE: NEGATIVE for bleeding problems  PSYCHIATRIC: NEGATIVE for changes in mood or affect    Problem list and histories reviewed & adjusted, as indicated.  Additional history: as documented    PAST SURGICAL HISTORY:  Patient  has a past surgical history that includes Optical tracking system implant shunt ventriculoperitoneal (Right, 9/2/2018) and Remove shunt ventriculoperitoneal (Right, 9/2/2018).    CURRENT MEDICATIONS:  Current Outpatient Prescriptions   Medication Sig Dispense Refill     Acetaminophen (TYLENOL PO) Take 500 mg by mouth every 6 hours as needed for mild pain or fever       ClonazePAM (KLONOPIN PO) Take 1 mg by mouth 3 times daily as needed for anxiety       levETIRAcetam 1000 MG TABS Take 1,000 mg by mouth 2 times daily 60 tablet 1     Levothyroxine Sodium (SYNTHROID PO) Take 75 mcg by mouth daily       norgestimate-ethinyl estradiol (ORTHO-CYCLEN, SPRINTEC) 0.25-35 MG-MCG per tablet Take 1 tablet by mouth daily       Zolpidem Tartrate (AMBIEN PO) Take 10 mg by mouth nightly as needed for sleep       ALLERGY:  No Known Allergies    OBJECTIVE:                                                    /80 (BP Location: Right arm, Patient Position: Sitting, Cuff Size: Adult Large)  Pulse 76  Temp 98.2  F (36.8  C) (Oral)  Resp 16  Ht 1.803 m (5' 11\")  Wt 89.5 kg (197 lb 4.8 oz)  SpO2 99%  BMI 27.52 kg/m2 Body mass index is 27.52 kg/(m^2).   GENERAL:  alert, oriented and no distress  EYES: Eyes " grossly normal to inspection, PERRL and conjunctivae and sclerae normal  HENT:  mouth without ulcers or lesions  NECK: suno adenopathy, no asymmetry, masses, or scars and thyroid normal to palpation  RESP: lungs clear to auscultation - no rales, rhonchi or wheezes  CV: regular rate and rhythm, normal S1 S2, no murmur, click or rub, no peripheral edema   ABDOMEN: soft, nontender, no hepatosplenomegaly, no masses and bowel sounds normal  MS: no gross musculoskeletal defects noted, no edema  SKIN: no suspicious lesions or rashes  NEURO: Normal strength and tone, mentation intact and speech normal, gait - steady   PSYCH: mentation appears normal, affect normal  LYMPH: no cervical, supraclavicular, axillary  adenopathy  PICC line : left arm - ok, non tender        ASSESSMENT AND PLAN:                                                      43 year old year old female with ADHD, OA, depression, anxiety and  vellum interpositum intracranial arachnoid cyst s/p ventriculoperiotoneal shunt 7/18/2018 admitted on 9/1/2018 with severe headaches, unresponsiveness and noted to have ventriculitis and edema due to  shunt placed at OSH.   1. Arachnoid cyst detected 7/2018 s/p EVD and ultimately VPS on 7/18/18 at [facility] complicated by ventriculitis -  Staph aureus MSSA + small abscesses seen on MRI 9/2/18  - S/p removal VPS and placement EVD 9/2/18.  - CT brain wo contrast 9/4 comparison 9/3/18   1. Stable positioning of right frontal approach ventriculostomy catheter, without significant change in size of the ventricles since prior exam.  2. Continued vasogenic edema involving predominantly the white matter of the right occipital parietal distribution, not significantly changed from prior exam.  3. No evidence of intracranial hemorrhage.     CT brain wo contrast 9/3/18  Impression:    1. Rim-enhancing fluid collection seen on MRI are not demonstrated on this CT examination there is continued vasogenic edema involving  predominantly the right matter of the right occipital parietal  distribution, not significantly changed from 9/2/2018. Continued mass effect and effacement of the right occipital horn.  2. Stable positioning of right frontal approach ventriculostomy catheter, without significant change in size of the ventricles since prior exam.  3. No evidence for intracranial hemorrhage.      - MRI brain w and wo contrast 9/2/18   Impression:  1. Small rim-enhancing fluid collections in the posterior right cerebral hemisphere along the ventriculostomy catheter tract, most likely small abscess along a catheter tract.  2. Confluent abnormal T2 signal surrounding these collections is favored edema.  MRI brain w/wo contrast 10/7/18 ( [facility] )   FINDINGS: Since comparison exam, the RIGHT occipital approach  shunt has been removed. Improved T2/FLAIR hyperintense signal abnormality, with decreased T2/FLAIR hyperintense signal within the splenium of the corpus callosum and about the posterior aspect of the RIGHT lateral ventricle. T2/FLAIR hyperintense signal abnormality continues to involve the paramedian RIGHT posterior parietal lobe as well as the RIGHT and central aspects of the splenium. Similarly, the previously described T2 hypointense lesions around the previously noted  shunt catheter have decreased in size. There remains two small peripherally-enhancing foci, one on axial post gadolinium image 16 and one on axial post gadolinium image 17. These measure approximately 1-2 mm and 3-4 mm, respectively. Lack of postcontrast imaging previously limits comparison, but these are likely improved. Otherwise, there is enhancement and signal abnormality along the course of the prior RIGHT occipital approach  shunt catheter, without additional discrete peripherally enhancing abscess. Small focus of restricted diffusion in the RIGHT paramedian parietal lobe measures 4 mm (diffusion image 17) and is unchanged since comparison. Small focus  of susceptibility artifact/hemosiderin deposition along the posterior aspect of this area of restricted diffusion. Additional small amount of linear susceptibility tracking along the prior RIGHT occipital  shunt tract. Stable focus of restricted diffusion in the central aspect of the splenium of the corpus callosum (diffusion image 19). Decreased restricted diffusion in the RIGHT fornix. Stable focus of restricted diffusion in the LEFT fornix. Resolved foci of mild diffusion restriction in the dependent LEFT lateral ventricle and LEFT occipital horn. Improved nonenhancing T2 hyperintense signal abnormality along the anterior aspect of the LEFT frontal horn of the lateral ventricle. Stable T2 nonenhancing/FLAIR signal abnormality along the course of a prior RIGHT frontal approach  catheter tract. Stable hydrocephalus involving the lateral and third ventricles. Stable appearance of the anterior septum pellucidum and cavum septum interpositum cyst. Major intracranial flow voids are preserved. LEFT dominant vertebrobasilar system. No evidence of acute sinusitis. Minimal mucosal thickening in the paranasal sinuses. No mastoid effusion.    IMPRESSION: Improving sequela related to RIGHT occipital approach  shunt catheter infection, as described above.    MRI brain w/wo contrast 11/5/18  Impression:  1. Slightly enhancement and vasogenic edema in the right occipital lobe compared to 10/7/2018, and significantly decreased compared to  9/11/2018, in a patient with a history of 2 intracranial abscesses.   2. No evident hydrocephalus in a patient with ventriculostomy.    2. Depression/anxiety  3. ADHD  4. PICC placed on 9/4/18  5. CRP normalized since 10/8/18, normal WBC     Recommendations:   - stop continue Nafcillin - s/p > 8 weeks of Nafcillin. [facility] notified.   - remove PICC   - advised to take tylenol and avoid narcotic if possible . cautious with Ibuprofen use.   - ok to get flu shot. she will get  it at her regular clinic   - discussed lab results     advised to call if she has any questions/ concerns     Rei Austin MD, M.Med.Sc  Division of Infectious Diseases and International Medicine  [facility]

## 2018-11-06 NOTE — PROGRESS NOTES
Assessment & Plan     Bandar Olmedo is a 43 year old female with the following diagnoses:   1. Homonymous hemianopia, left    2. Subjective visual disturbance    3. Brain abscess    4. Shunt malfunction, subsequent encounter         Bandar Olmedo presents to neuroophthalmology clinic for consultation. She has a history of a  shunt placement on 7/18/18 for a symptomatic cavum septum pellucidum (sub-cranial arachnoid cyst). She presented on 9/1/18 after a fall with worsening headaches with workup revealing shunt infection and brain abscess on 9/1/18. CSF grew MSSA.  shunt donte removed and EVD was placed on 9/2/18. Serial CSF fluid was cultured with last positive culture on 9/2/18, 9/3-9/10 were negative. The EVD was removed on 9/10/18 and no  shunt was placed. Today is the first day off of antibiotics.     Reports long history of headaches since 80s, and was told they were migraines. She was having worsening headaches when they found the cyst initially. The headaches improved with the  shunt. Now that the shunt is removed she reports worsening of headaches. It is described as a constant dull pressure along her temporal. It can last hours or all day. It happens more frequently now occurring daily. She also has light and noise sensitivity which makes the headaches worse. She takes tylenol which helps it feel better a little bit. She reports that at times she can hear her heart beat when her headaches are very bad. She also thinks that her vision is not as clear as it used to be. She denies TVOs, double vision, new floaters, or flashes.     Past ocular history of refractive error. Mother with glaucoma. Past medical history significant for cavum septum pellucidum, depression, ADHD.     On exam, visual acuity is 20/40 in the right eye and 20/20 in the left eye. Intraocular pressures are normal. Extraocular movements are full. Pupillary exam is normal, and color vision is full. Anterior slit lamp  examination is normal. Fundus exam is notable for normal Visual field shows right eye with inferior nasal depression and left eye with inferior temporal depression.      It is my impression that this patient does not have evidence of increased intracranial pressure.  She has spontaneous venous pulsations which indicates that she has normal intracranial pressure.  I reviewed her MRI images personally.  There was significant dilation of the ventricles from the cyst initially. This has improved significantly.  There are T2 hyperintensities in the occipital lobe white matter on the right.  Vision improves to 20/20 with new glasses prescription. She does have a left peripapillary hemorrhage of unknown etiology. IOP is normal. Recommend that she has repeat dilated eye exam in 6-8 weeks that can be done by local ophthalmologist.  She will arrange this to happen.      Her visual field defect is inferior and does not cross the horizontal.  She is legally allowed to drive from a visual standpoint.          Attending Physician Attestation:  Complete documentation of historical and exam elements from today's encounter can be found in the full encounter summary report (not reduplicated in this progress note).  I personally obtained the chief complaint(s) and history of present illness.  I confirmed and edited as necessary the review of systems, past medical/surgical history, family history, social history, and examination findings as documented by others; and I examined the patient myself.  I personally reviewed the relevant tests, images, and reports as documented above.  I formulated and edited as necessary the assessment and plan and discussed the findings and management plan with the patient and family. - Michael Patton MD  Ophthalmology Resident, PGY-3  [facility]

## 2018-11-06 NOTE — TELEPHONE ENCOUNTER
Pt was here to see Dr Sarkar today for clinic apt. IV Abx stopped and PICC has been pulled.  Radha Cruz RN

## 2018-11-08 DIAGNOSIS — H53.469 HOMONYMOUS HEMIANOPIA: Primary | ICD-10-CM

## 2018-11-08 NOTE — PROGRESS NOTES
This is a recent snapshot of the patient's [facility] medical record.  For current drug dose and complete information and questions, call [phone] or In Basket pool, [facility] home infusion ([id number])  CSN Number:  [id number]

## 2018-11-19 NOTE — PROGRESS NOTES
This is a recent snapshot of the patient's [facility] medical record.  For current drug dose and complete information and questions, call [phone] or In Basket pool, [facility] home infusion ([id number])  CSN Number:  [id number]

## 2018-11-29 DIAGNOSIS — T85.618D SHUNT MALFUNCTION, SUBSEQUENT ENCOUNTER: ICD-10-CM

## 2018-11-29 DIAGNOSIS — G06.0 BRAIN ABSCESS: Primary | ICD-10-CM

## 2018-12-13 ENCOUNTER — MEDICAL CORRESPONDENCE (OUTPATIENT)
Dept: HEALTH INFORMATION MANAGEMENT | Facility: CLINIC | Age: 43
End: 2018-12-13

## 2019-02-04 ENCOUNTER — OFFICE VISIT (OUTPATIENT)
Dept: NEUROSURGERY | Facility: CLINIC | Age: 44
End: 2019-02-04
Attending: NEUROLOGICAL SURGERY
Payer: COMMERCIAL

## 2019-02-04 ENCOUNTER — ANCILLARY PROCEDURE (OUTPATIENT)
Dept: MRI IMAGING | Facility: CLINIC | Age: 44
End: 2019-02-04
Attending: NEUROLOGICAL SURGERY
Payer: COMMERCIAL

## 2019-02-04 VITALS
SYSTOLIC BLOOD PRESSURE: 135 MMHG | WEIGHT: 217.7 LBS | HEART RATE: 89 BPM | DIASTOLIC BLOOD PRESSURE: 85 MMHG | OXYGEN SATURATION: 99 % | TEMPERATURE: 96.9 F | BODY MASS INDEX: 30.36 KG/M2

## 2019-02-04 DIAGNOSIS — T85.618D SHUNT MALFUNCTION, SUBSEQUENT ENCOUNTER: ICD-10-CM

## 2019-02-04 DIAGNOSIS — G06.0 BRAIN ABSCESS: ICD-10-CM

## 2019-02-04 PROCEDURE — G0463 HOSPITAL OUTPT CLINIC VISIT: HCPCS | Mod: ZF

## 2019-02-04 RX ORDER — LISDEXAMFETAMINE DIMESYLATE 20 MG/1
CAPSULE ORAL
Refills: 0 | COMMUNITY
Start: 2018-08-22

## 2019-02-04 RX ORDER — TOPIRAMATE 25 MG/1
200 TABLET, FILM COATED ORAL
COMMUNITY
Start: 2018-12-12

## 2019-02-04 RX ORDER — GADOBUTROL 604.72 MG/ML
10 INJECTION INTRAVENOUS ONCE
Status: COMPLETED | OUTPATIENT
Start: 2019-02-04 | End: 2019-02-04

## 2019-02-04 RX ORDER — DOXEPIN HYDROCHLORIDE 10 MG/1
CAPSULE ORAL
Refills: 1 | COMMUNITY
Start: 2019-01-04

## 2019-02-04 RX ORDER — LISDEXAMFETAMINE DIMESYLATE 50 MG
CAPSULE ORAL
Refills: 0 | COMMUNITY
Start: 2018-06-27

## 2019-02-04 RX ORDER — VORTIOXETINE 10 MG/1
TABLET, FILM COATED ORAL
Refills: 0 | COMMUNITY
Start: 2018-12-10

## 2019-02-04 RX ORDER — LEVETIRACETAM 750 MG/1
TABLET ORAL
Refills: 1 | COMMUNITY
Start: 2019-01-04

## 2019-02-04 RX ADMIN — GADOBUTROL 10 ML: 604.72 INJECTION INTRAVENOUS at 14:22

## 2019-02-04 ASSESSMENT — PAIN SCALES - GENERAL: PAINLEVEL: MODERATE PAIN (5)

## 2019-02-04 NOTE — PROGRESS NOTES
"Oncology Rooming Note    February 4, 2019 3:38 PM   Bandar Olmedo is a 43 year old female who presents for:    No chief complaint on file.    Initial Vitals: There were no vitals taken for this visit. Estimated body mass index is 27.52 kg/m  as calculated from the following:    Height as of 11/6/18: 1.803 m (5' 11\").    Weight as of 11/6/18: 89.5 kg (197 lb 4.8 oz). There is no height or weight on file to calculate BSA.  Data Unavailable Comment: Data Unavailable   No LMP recorded.  Allergies reviewed: Yes  Medications reviewed: Yes    Medications: MEDICATION REFILLS NEEDED TODAY. Provider was notified.  Pharmacy name entered into EPIC: [id number] - [place], [place] - [address] [facility] [address] [facility]    Clinical concerns: Refill- Vyvanse-20 and 70mg  Would like to know if she can stop taking Levetiracetam.     7 minutes for nursing intake (face to face time)     Tessa Salazar CMA                "

## 2019-02-04 NOTE — LETTER
"2/4/2019       RE: Bandar Olmedo  [address]  [facility] [address]     Dear Colleague,    Thank you for referring your patient, Bandar Olmedo, to the [facility]. Please see a copy of my visit note below.    Oncology Rooming Note    February 4, 2019 3:38 PM   Bandar Olmedo is a 43 year old female who presents for:    No chief complaint on file.    Initial Vitals: There were no vitals taken for this visit. Estimated body mass index is 27.52 kg/m  as calculated from the following:    Height as of 11/6/18: 1.803 m (5' 11\").    Weight as of 11/6/18: 89.5 kg (197 lb 4.8 oz). There is no height or weight on file to calculate BSA.  Data Unavailable Comment: Data Unavailable   No LMP recorded.  Allergies reviewed: Yes  Medications reviewed: Yes    Medications: MEDICATION REFILLS NEEDED TODAY. Provider was notified.  Pharmacy name entered into EPIC: [id number] - [place], [place] - [address] [facility] [facility] [facility]    Clinical concerns: Refill- Vyvanse-20 and 70mg  Would like to know if she can stop taking Levetiracetam.     7 minutes for nursing intake (face to face time)     Tessa Salazar CMA                  2/4/2019  Neurosurgery Clinic Visit - Return    Subjective:   Ms. Olmedo is a 44 yo F with PMH cavum septum pellucidum s/p  shunt 7/2018 at OSH complicated by shunt infection and right occipital abscess, s/p removal of  shunt 9/2018 presenting for follow-up. Patient states she is no longer on antibiotics, but does still have frequent daily frontal headaches. Sometimes she wakes up with the headaches, and takes Tylenol, but otherwise there is no particular identifiable pattern or trigger for her headaches. Intraocular pressure 11/2018 as evaluated by Dr. Joseph of neuro-ophthalmology was normal. Denies vision problems, numbness, weakness, bowel/bladder symptoms, balance issues.    Physical exam:   /85 (BP Location: Right arm, Patient Position: Chair, Cuff Size: Adult Large)   Pulse 89 "   Temp 96.9  F (36.1  C) (Oral)   Wt 98.7 kg (217 lb 11.2 oz)   SpO2 99%   BMI 30.36 kg/m       General: Awake and alert and in no acute distress.  Pulm: Breathing comfortably on room air  CN: Symmetric browlift, smile, tongue protrusion, palate elevation, and sternocleidomastoids. No dysarthria. Extraocular muscles are all intact. Pupils react bilaterally and equally  Coordination: Intact finger-nose-finger bilaterally.   Motor: No pronator drift. Good muscle bulk throughout. 5 out of 5 strength in bilateral upper and lower extremities  Sensation: Sensation grossly intact to light touch in all extremities.  Gait: Intact tandem gait.  Reflexes: 2+ reflexes bilateral biceps, brachioradialis, and patellar tendons. Sun's reflex negative.     Imaging:  MRI brain 2/4/19:  1. Further decreased enhancement and vasogenic edema within the right occipital lobe along prior ventriculostomy catheter tract in  comparison to 11/5/2018.  2. Persistent mild T2 hyperintensity and decreased enhancement along the prior tract of the right frontal approach ventriculostomy  catheter.    Assessment:    # cavum septum pellucidum s/p  shunt 7/2018 at OSH complicated by shunt infection and right occipital abscess, s/p removal of  shunt 9/2018, now with resolved infection, cavum septum pellucidum, with hydrocephalus, but no signs of significantly increased intracranial pressure  # chronic frontal headaches    Plan:    - Referral to headache clinic  - No neurosurgical intervention indicated at this time  - Wean Keppra off. Go from 750 mg bid -> 750 mg every day for 1 week -> then stop. Discussed low likelihood of recurrent seizures since her first one was provoked by intracranial abscess and edema, but may need to resume Keppra if has a second seizure. Discussed risks/benefits of weaning off her AED.  - Patient requested to refill Vyvanse for ADD, but advised that we cannot refill due to inability to provide adequate follow-up for  this medication, and encouraged her to see her PCP or a psychiatrist for this issue.    Patient seen and discussed with Dr. Antonio Salinas.    Luis Goldberg MD  Neurosurgery Resident PGY1    I saw the patient with the resident.  I have reviewed and edited the resident note and agree with the plan of care.      Antonio Salinas MD

## 2019-02-04 NOTE — PROGRESS NOTES
2/4/2019  Neurosurgery Clinic Visit - Return    Subjective:   Ms. Olmedo is a 42 yo F with PMH cavum septum pellucidum s/p  shunt 7/2018 at OSH complicated by shunt infection and right occipital abscess, s/p removal of  shunt 9/2018 presenting for follow-up. Patient states she is no longer on antibiotics, but does still have frequent daily frontal headaches. Sometimes she wakes up with the headaches, and takes Tylenol, but otherwise there is no particular identifiable pattern or trigger for her headaches. Intraocular pressure 11/2018 as evaluated by Dr. Joseph of neuro-ophthalmology was normal. Denies vision problems, numbness, weakness, bowel/bladder symptoms, balance issues.    Physical exam:   /85 (BP Location: Right arm, Patient Position: Chair, Cuff Size: Adult Large)   Pulse 89   Temp 96.9  F (36.1  C) (Oral)   Wt 98.7 kg (217 lb 11.2 oz)   SpO2 99%   BMI 30.36 kg/m      General: Awake and alert and in no acute distress.  Pulm: Breathing comfortably on room air  CN: Symmetric browlift, smile, tongue protrusion, palate elevation, and sternocleidomastoids. No dysarthria. Extraocular muscles are all intact. Pupils react bilaterally and equally  Coordination: Intact finger-nose-finger bilaterally.   Motor: No pronator drift. Good muscle bulk throughout. 5 out of 5 strength in bilateral upper and lower extremities  Sensation: Sensation grossly intact to light touch in all extremities.  Gait: Intact tandem gait.  Reflexes: 2+ reflexes bilateral biceps, brachioradialis, and patellar tendons. Sun's reflex negative.     Imaging:  MRI brain 2/4/19:  1. Further decreased enhancement and vasogenic edema within the right occipital lobe along prior ventriculostomy catheter tract in  comparison to 11/5/2018.  2. Persistent mild T2 hyperintensity and decreased enhancement along the prior tract of the right frontal approach ventriculostomy  catheter.    Assessment:    # cavum septum pellucidum s/p  shunt  7/2018 at OSH complicated by shunt infection and right occipital abscess, s/p removal of  shunt 9/2018, now with resolved infection, cavum septum pellucidum, with hydrocephalus, but no signs of significantly increased intracranial pressure  # chronic frontal headaches    Plan:    - Referral to headache clinic  - No neurosurgical intervention indicated at this time  - Wean Keppra off. Go from 750 mg bid -> 750 mg every day for 1 week -> then stop. Discussed low likelihood of recurrent seizures since her first one was provoked by intracranial abscess and edema, but may need to resume Keppra if has a second seizure. Discussed risks/benefits of weaning off her AED.  - Patient requested to refill Vyvanse for ADD, but advised that we cannot refill due to inability to provide adequate follow-up for this medication, and encouraged her to see her PCP or a psychiatrist for this issue.    Patient seen and discussed with Dr. Antonio Salinas.    Luis Goldberg MD  Neurosurgery Resident PGY1    I saw the patient with the resident.  I have reviewed and edited the resident note and agree with the plan of care.      Antonio Salinas MD

## 2019-02-04 NOTE — DISCHARGE INSTRUCTIONS
MRI Contrast Discharge Instructions    The IV contrast you received today will pass out of your body in your  urine. This will happen in the next 24 hours. You will not feel this process.  Your urine will not change color.    Drink at least 4 extra glasses of water or juice today (unless your doctor  has restricted your fluids). This reduces the stress on your kidneys.  You may take your regular medicines.    If you are on dialysis: It is best to have dialysis today.    If you have a reaction: Most reactions happen right away. If you have  any new symptoms after leaving the hospital (such as hives or swelling),  call your hospital at the correct number below. Or call your family doctor.  If you have breathing distress or wheezing, call 911.    Special instructions: ***    I have read and understand the above information.    Signature:______________________________________ Date:___________    Staff:__________________________________________ Date:___________     Time:__________    [place] [facility] Departments:    ___[place]: [phone]  ___[facility]: [phone]  ___[place]: [phone] ___[facility]: [phone]  ___[facility]: [phone]  ___[facility]: [phone]  ___[phone]  ___[facility]: [phone]  ___[phone]

## 2019-06-17 PROBLEM — Q04.8 OTHER SPECIFIED CONGENITAL MALFORMATIONS OF BRAIN (H): Status: ACTIVE | Noted: 2018-11-05

## 2019-08-12 ENCOUNTER — ANCILLARY PROCEDURE (OUTPATIENT)
Dept: MRI IMAGING | Facility: CLINIC | Age: 44
End: 2019-08-12
Payer: COMMERCIAL

## 2019-08-12 ENCOUNTER — OFFICE VISIT (OUTPATIENT)
Dept: NEUROSURGERY | Facility: CLINIC | Age: 44
End: 2019-08-12
Attending: NEUROLOGICAL SURGERY
Payer: COMMERCIAL

## 2019-08-12 VITALS
WEIGHT: 210.3 LBS | HEART RATE: 63 BPM | HEIGHT: 71 IN | TEMPERATURE: 97.7 F | DIASTOLIC BLOOD PRESSURE: 82 MMHG | BODY MASS INDEX: 29.44 KG/M2 | RESPIRATION RATE: 16 BRPM | SYSTOLIC BLOOD PRESSURE: 139 MMHG | OXYGEN SATURATION: 100 %

## 2019-08-12 DIAGNOSIS — Q04.8 OTHER SPECIFIED CONGENITAL MALFORMATIONS OF BRAIN (H): ICD-10-CM

## 2019-08-12 DIAGNOSIS — Q04.8 OTHER SPECIFIED CONGENITAL MALFORMATIONS OF BRAIN (H): Primary | ICD-10-CM

## 2019-08-12 PROCEDURE — G0463 HOSPITAL OUTPT CLINIC VISIT: HCPCS | Mod: ZF

## 2019-08-12 RX ORDER — GABAPENTIN 300 MG/1
CAPSULE ORAL
Refills: 2 | COMMUNITY
Start: 2019-08-07

## 2019-08-12 RX ORDER — GADOBUTROL 604.72 MG/ML
10 INJECTION INTRAVENOUS ONCE
Status: COMPLETED | OUTPATIENT
Start: 2019-08-12 | End: 2019-08-12

## 2019-08-12 RX ADMIN — GADOBUTROL 10 ML: 604.72 INJECTION INTRAVENOUS at 14:04

## 2019-08-12 ASSESSMENT — PAIN SCALES - GENERAL: PAINLEVEL: NO PAIN (0)

## 2019-08-12 ASSESSMENT — MIFFLIN-ST. JEOR: SCORE: 1690.04

## 2019-08-12 NOTE — DISCHARGE INSTRUCTIONS
MRI Contrast Discharge Instructions    The IV contrast you received today will pass out of your body in your  urine. This will happen in the next 24 hours. You will not feel this process.  Your urine will not change color.    Drink at least 4 extra glasses of water or juice today (unless your doctor  has restricted your fluids). This reduces the stress on your kidneys.  You may take your regular medicines.    If you are on dialysis: It is best to have dialysis today.    If you have a reaction: Most reactions happen right away. If you have  any new symptoms after leaving the hospital (such as hives or swelling),  call your hospital at the correct number below. Or call your family doctor.  If you have breathing distress or wheezing, call 911.    Special instructions: ***    I have read and understand the above information.    Signature:______________________________________ Date:___________    Staff:__________________________________________ Date:___________     Time:__________    [place] [facility] Departments:    ___[place]: [phone]  ___[facility]: [phone]  ___[place]: [phone] ___[facility]: [phone]  ___[facility]: [phone]  ___[facility]: [phone]  ___[phone]  ___[facility]: [phone]  ___[phone]

## 2019-08-12 NOTE — NURSING NOTE
"Oncology Rooming Note    August 12, 2019 3:15 PM   Bandar Olmedo is a 44 year old female who presents for:    Chief Complaint   Patient presents with     Oncology Clinic Visit     Return: Brain abscess      Initial Vitals: /82 (BP Location: Right arm, Patient Position: Sitting, Cuff Size: Adult Large)   Pulse 63   Temp 97.7  F (36.5  C) (Oral)   Resp 16   Ht 1.803 m (5' 11\")   Wt 95.4 kg (210 lb 4.8 oz)   SpO2 100%   BMI 29.33 kg/m   Estimated body mass index is 29.33 kg/m  as calculated from the following:    Height as of this encounter: 1.803 m (5' 11\").    Weight as of this encounter: 95.4 kg (210 lb 4.8 oz). Body surface area is 2.19 meters squared.  No Pain (0) Comment: Data Unavailable   No LMP recorded.  Allergies reviewed: Yes  Medications reviewed: Yes    Medications: Medication refills not needed today.  Pharmacy name entered into EPIC: [id number] - [place], [place] - [address] [facility]    Clinical concerns: N/A        Kristy Champagne CMA              "

## 2019-08-12 NOTE — LETTER
8/12/2019       RE: Bandar Olmedo  [address]  [facility] [address]     Dear Colleague,    Thank you for referring your patient, Bandar Olmedo, to the [facility]. Please see a copy of my visit note below.    8/12/2019     Clinic Note    Reason for visit: follow-up ventriculostomy    History of present illness:  45 y/o F w/ hx of cavum septum pellucidum s/p  shunt placement 7/2018 at an outside hospital s/p  shunt removal because of infection and s/p ventriculostomy presents at clinic for follow-up of her MRI.  She continues to have headache that is present most of the days, primarily around her temporal side of her head. It does not change throughout the day, but improves when she cancels out light and noise. She has been seen by a neurologist for her headache management and was recommended to try botox injections, which were declined by her insurance.    She denies double vision, gait imbalance or weakness.      Review of systems: 10 point ROS negative except for as detailed in HPI  Past Medical History:   No past medical history on file.  Surgical History:   Past Surgical History:   Procedure Laterality Date     OPTICAL TRACKING SYSTEM IMPLANT SHUNT VENTRICULOPERITONEAL Right 9/2/2018    Procedure: OPTICAL TRACKING SYSTEM IMPLANT SHUNT VENTRICULOPERITONEAL;  Right Stealth guided external ventricular drain placement, and removal of ventriculoperitoneal shunt system;  Surgeon: Antonio Salinas MD;  Location: [facility] OR     REMOVE SHUNT VENTRICULOPERITONEAL Right 9/2/2018    Procedure: REMOVE SHUNT VENTRICULOPERITONEAL;  removal of ventriculoperitoneal shunt system;  Surgeon: Antonio Salinas MD;  Location: [facility] OR     Medications:  Current Outpatient Medications   Medication     Acetaminophen (TYLENOL PO)     doxepin (SINEQUAN) 10 MG capsule     gabapentin (NEURONTIN) 300 MG capsule     Levothyroxine Sodium (SYNTHROID PO)     norgestimate-ethinyl estradiol (ORTHO-CYCLEN, SPRINTEC)  "0.25-35 MG-MCG per tablet     topiramate (TOPAMAX) 25 MG tablet     TRINTELLIX 10 MG tablet     VYVANSE 20 MG capsule     VYVANSE 50 MG capsule     Zolpidem Tartrate (AMBIEN PO)     ClonazePAM (KLONOPIN PO)     levETIRAcetam (KEPPRA) 750 MG tablet     levETIRAcetam 1000 MG TABS     No current facility-administered medications for this visit.        Physical exam:   /82 (BP Location: Right arm, Patient Position: Sitting, Cuff Size: Adult Large)   Pulse 63   Temp 97.7  F (36.5  C) (Oral)   Resp 16   Ht 1.803 m (5' 11\")   Wt 95.4 kg (210 lb 4.8 oz)   SpO2 100%   BMI 29.33 kg/m       General: Awake and alert and in no acute distress.  Pulm: Breathing comfortably on room air  CN: Symmetric browlift, smile, tongue protrusion, palate elevation, and sternocleidomastoids. No dysarthria. Extraocular muscles are all intact. Pupils react bilaterally and equally  Coordination: Intact finger-nose-finger bilaterally. Symmetric rapid alternating movements in bilateral upper extremities   Motor: No pronator drift. Good muscle bulk throughout. 5 out of 5 strength in bilateral upper and lower extremities  Gait: Intact tandem gait. Negative Romberg  Reflexes: 2+ reflexes bilateral biceps, brachioradialis, and patellar tendons.       Imaging:  MRI brain (8/12/2019)  Further reduced edema along the tracts of the previous  right frontal and right occipital approach ventriculostomy catheters  since 2/4/2019. No hydrocephalus.      Assessment:  45 y/o F w/ hx of cavum septum pellucidum s/p  shunt placement and removal s/o ventriculostomy presents radiographically and clinically stable without suspicion for hydrocephalus.      Plan:  - follow-up prn  - continue headache management with her neurologist      Patient seen and discussed with MD Lupillo Membreno MD  Neurosurgery, PGY-1    I saw the patient with the resident.  I have reviewed and edited the resident note and agree with the plan of care.      Antonio" DWIGHT Salinas MD

## 2019-08-12 NOTE — PROGRESS NOTES
8/12/2019     Clinic Note    Reason for visit: follow-up ventriculostomy    History of present illness:  43 y/o F w/ hx of cavum septum pellucidum s/p  shunt placement 7/2018 at an outside hospital s/p  shunt removal because of infection and s/p ventriculostomy presents at clinic for follow-up of her MRI.  She continues to have headache that is present most of the days, primarily around her temporal side of her head. It does not change throughout the day, but improves when she cancels out light and noise. She has been seen by a neurologist for her headache management and was recommended to try botox injections, which were declined by her insurance.    She denies double vision, gait imbalance or weakness.      Review of systems: 10 point ROS negative except for as detailed in HPI  Past Medical History:   No past medical history on file.  Surgical History:   Past Surgical History:   Procedure Laterality Date     OPTICAL TRACKING SYSTEM IMPLANT SHUNT VENTRICULOPERITONEAL Right 9/2/2018    Procedure: OPTICAL TRACKING SYSTEM IMPLANT SHUNT VENTRICULOPERITONEAL;  Right Stealth guided external ventricular drain placement, and removal of ventriculoperitoneal shunt system;  Surgeon: Antonio Salinas MD;  Location: [facility] OR     REMOVE SHUNT VENTRICULOPERITONEAL Right 9/2/2018    Procedure: REMOVE SHUNT VENTRICULOPERITONEAL;  removal of ventriculoperitoneal shunt system;  Surgeon: Antonio Salinas MD;  Location: [facility] OR     Medications:  Current Outpatient Medications   Medication     Acetaminophen (TYLENOL PO)     doxepin (SINEQUAN) 10 MG capsule     gabapentin (NEURONTIN) 300 MG capsule     Levothyroxine Sodium (SYNTHROID PO)     norgestimate-ethinyl estradiol (ORTHO-CYCLEN, SPRINTEC) 0.25-35 MG-MCG per tablet     topiramate (TOPAMAX) 25 MG tablet     TRINTELLIX 10 MG tablet     VYVANSE 20 MG capsule     VYVANSE 50 MG capsule     Zolpidem Tartrate (AMBIEN PO)     ClonazePAM (KLONOPIN PO)     levETIRAcetam (KEPPRA) 750  "MG tablet     levETIRAcetam 1000 MG TABS     No current facility-administered medications for this visit.        Physical exam:   /82 (BP Location: Right arm, Patient Position: Sitting, Cuff Size: Adult Large)   Pulse 63   Temp 97.7  F (36.5  C) (Oral)   Resp 16   Ht 1.803 m (5' 11\")   Wt 95.4 kg (210 lb 4.8 oz)   SpO2 100%   BMI 29.33 kg/m      General: Awake and alert and in no acute distress.  Pulm: Breathing comfortably on room air  CN: Symmetric browlift, smile, tongue protrusion, palate elevation, and sternocleidomastoids. No dysarthria. Extraocular muscles are all intact. Pupils react bilaterally and equally  Coordination: Intact finger-nose-finger bilaterally. Symmetric rapid alternating movements in bilateral upper extremities   Motor: No pronator drift. Good muscle bulk throughout. 5 out of 5 strength in bilateral upper and lower extremities  Gait: Intact tandem gait. Negative Romberg  Reflexes: 2+ reflexes bilateral biceps, brachioradialis, and patellar tendons.       Imaging:  MRI brain (8/12/2019)  Further reduced edema along the tracts of the previous  right frontal and right occipital approach ventriculostomy catheters  since 2/4/2019. No hydrocephalus.      Assessment:  45 y/o F w/ hx of cavum septum pellucidum s/p  shunt placement and removal s/o ventriculostomy presents radiographically and clinically stable without suspicion for hydrocephalus.      Plan:  - follow-up prn  - continue headache management with her neurologist      Patient seen and discussed with MD Lupillo Membreno MD  Neurosurgery, PGY-1    I saw the patient with the resident.  I have reviewed and edited the resident note and agree with the plan of care.      Antonio Salinas MD     "

## 2020-03-11 ENCOUNTER — HEALTH MAINTENANCE LETTER (OUTPATIENT)
Age: 45
End: 2020-03-11

## 2021-01-03 ENCOUNTER — HEALTH MAINTENANCE LETTER (OUTPATIENT)
Age: 46
End: 2021-01-03

## 2021-03-07 ENCOUNTER — HEALTH MAINTENANCE LETTER (OUTPATIENT)
Age: 46
End: 2021-03-07

## 2021-04-25 ENCOUNTER — HEALTH MAINTENANCE LETTER (OUTPATIENT)
Age: 46
End: 2021-04-25

## 2021-10-10 ENCOUNTER — HEALTH MAINTENANCE LETTER (OUTPATIENT)
Age: 46
End: 2021-10-10

## 2021-12-03 NOTE — MR AVS SNAPSHOT
After Visit Summary   11/5/2018    Bandar Olmedo    MRN: [id number]           Patient Information     Date Of Birth          1975        Visit Information        Provider Department      11/5/2018 2:00 PM [facility]        Today's Diagnoses     Subjective visual disturbance           Follow-ups after your visit        Who to contact     Please call your clinic at [phone] to:    Ask questions about your health    Make or cancel appointments    Discuss your medicines    Learn about your test results    Speak to your doctor            Additional Information About Your Visit        [employer] Information     [employer] gives you secure access to your electronic health record. If you see a primary care provider, you can also send messages to your care team and make appointments. If you have questions, please call your primary care clinic.  If you do not have a primary care provider, please call [phone] and they will assist you.      [employer] is an electronic gateway that provides easy, online access to your medical records. With [employer], you can request a clinic appointment, read your test results, renew a prescription or communicate with your care team.     To access your existing account, please contact your [facility] or call [phone] for assistance.        Care EveryWhere ID     This is your Care EveryWhere ID. This could be used by other organizations to access your [facility] medical records  [id number]         Blood Pressure from Last 3 Encounters:   11/06/18 126/80   11/05/18 151/90   10/17/18 129/83    Weight from Last 3 Encounters:   11/06/18 89.5 kg (197 lb 4.8 oz)   11/05/18 90.9 kg (200 lb 8 oz)   10/17/18 90 kg (198 lb 6.4 oz)              We Performed the Following     DILATED FUNDUS EXAM     Glaucoma Top OU        Primary Care Provider Office Phone # Fax #    Amna Benitez [phone]       [place]  LTM/EM[facility]   Video: Yes  Photic: No  HV: No  Impedances: Under 5  Leads Fixed: No  Events seen: No  Patient ratio: 2/6     [facility] [address]  [place] [address]        Equal Access to Services     ALVARO MERINODEWAYNE : Hadii aad ku hadboo Sonickieali, waaxda luqadaha, qaybta kaalmada ambargavinzuleyka, hemanth munguia naseemshannan wrightsarita baron rosas. So [facility] [phone].    ATENCIÓN: Si habla español, tiene a christopher disposición servicios gratuitos de asistencia lingüística. Llame al [phone].    We comply with applicable federal civil rights laws and [place] laws. We do not discriminate on the basis of race, color, national origin, age, disability, sex, sexual orientation, or gender identity.            Thank you!     Thank you for choosing [facility]  for your care. Our goal is always to provide you with excellent care. Hearing back from our patients is one way we can continue to improve our services. Please take a few minutes to complete the written survey that you may receive in the mail after your visit with us. Thank you!             Your Updated Medication List - Protect others around you: Learn how to safely use, store and throw away your medicines at www.disposemymeds.org.          This list is accurate as of 11/5/18 11:59 PM.  Always use your most recent med list.                   Brand Name Dispense Instructions for use Diagnosis    AMBIEN PO      Take 10 mg by mouth nightly as needed for sleep        KLONOPIN PO      Take 1 mg by mouth 3 times daily as needed for anxiety        levETIRAcetam 1000 MG Tabs     60 tablet    Take 1,000 mg by mouth 2 times daily    Infection of ventricular shunt, initial encounter (H)       norgestimate-ethinyl estradiol 0.25-35 MG-MCG per tablet    ORTHO-CYCLEN, SPRINTEC     Take 1 tablet by mouth daily        SYNTHROID PO      Take 75 mcg by mouth daily        TYLENOL PO      Take 500 mg by mouth every 6 hours as needed for mild pain or fever

## 2022-03-26 ENCOUNTER — HEALTH MAINTENANCE LETTER (OUTPATIENT)
Age: 47
End: 2022-03-26

## 2022-05-21 ENCOUNTER — HEALTH MAINTENANCE LETTER (OUTPATIENT)
Age: 47
End: 2022-05-21

## 2022-09-18 ENCOUNTER — HEALTH MAINTENANCE LETTER (OUTPATIENT)
Age: 47
End: 2022-09-18

## 2023-05-07 ENCOUNTER — HEALTH MAINTENANCE LETTER (OUTPATIENT)
Age: 48
End: 2023-05-07

## 2023-06-04 ENCOUNTER — HEALTH MAINTENANCE LETTER (OUTPATIENT)
Age: 48
End: 2023-06-04

## (undated) DEVICE — GLOVE PROTEXIS MICRO 7.5  2D73PM75

## (undated) DEVICE — PREP POVIDONE IODINE SCRUB 7.5% 4OZ APL82212

## (undated) DEVICE — MANOMETER VENOUS PRESSURE W/4-WAY STOPCOCK 35ML MX441

## (undated) DEVICE — PREP SKIN SCRUB TRAY 4461A

## (undated) DEVICE — SPONGE SURGIFOAM 12 1972

## (undated) DEVICE — DRAPE STERI INCISE 24X14" 1040

## (undated) DEVICE — NDL BLUNT 17GA 1.5" 8881202330

## (undated) DEVICE — NDL 21GA 1.5"

## (undated) DEVICE — LINEN TOWEL PACK X5 5464

## (undated) DEVICE — SU VICRYL 2-0 CT-2 CR 8X18" J726D

## (undated) DEVICE — PAD CHUX UNDERPAD 23X24" 7136

## (undated) DEVICE — PREP POVIDONE IODINE SCRUB 7.5% 120ML

## (undated) DEVICE — SHUNT TROCAR SPLIT 82-4095

## (undated) DEVICE — GLOVE PROTEXIS BLUE W/NEU-THERA 8.5  2D73EB85

## (undated) DEVICE — PREP CHLORAPREP 26ML TINTED ORANGE  260815

## (undated) DEVICE — SUCTION MANIFOLD DORNOCH ULTRA CART UL-CL500

## (undated) DEVICE — DRSG PRIMAPORE 02X3" 7133

## (undated) DEVICE — SUTURE BOOTS 051003PBX

## (undated) DEVICE — LABEL MEDICATION SYSTEM 3303-P

## (undated) DEVICE — SOL WATER IRRIG 1000ML BOTTLE 2F7114

## (undated) DEVICE — PREP CHLORAPREP CLEAR 3ML 260400

## (undated) DEVICE — TRACKER PATIENT NON-INVASIVE AXIEM 9734887XOM

## (undated) DEVICE — DRAPE POUCH IRR 1016

## (undated) DEVICE — LINEN TOWEL PACK X6 WHITE 5487

## (undated) DEVICE — PACK NEURO MINOR UMMC SNE32MNMU4

## (undated) DEVICE — TEST TUBE W/SCREW CAP 17361

## (undated) DEVICE — DRAPE SHEET REV FOLD 3/4 9349

## (undated) DEVICE — SU SILK 2-0 TIE 12X30" A305H

## (undated) DEVICE — ESU GROUND PAD ADULT W/CORD E7507

## (undated) RX ORDER — LIDOCAINE HYDROCHLORIDE 10 MG/ML
INJECTION, SOLUTION EPIDURAL; INFILTRATION; INTRACAUDAL; PERINEURAL
Status: DISPENSED
Start: 2018-09-04

## (undated) RX ORDER — PROPOFOL 10 MG/ML
INJECTION, EMULSION INTRAVENOUS
Status: DISPENSED
Start: 2018-09-02

## (undated) RX ORDER — ONDANSETRON 2 MG/ML
INJECTION INTRAMUSCULAR; INTRAVENOUS
Status: DISPENSED
Start: 2018-09-02

## (undated) RX ORDER — FENTANYL CITRATE 50 UG/ML
INJECTION, SOLUTION INTRAMUSCULAR; INTRAVENOUS
Status: DISPENSED
Start: 2018-09-02

## (undated) RX ORDER — PHENYLEPHRINE HCL IN 0.9% NACL 1 MG/10 ML
SYRINGE (ML) INTRAVENOUS
Status: DISPENSED
Start: 2018-09-02

## (undated) RX ORDER — EPHEDRINE SULFATE 50 MG/ML
INJECTION, SOLUTION INTRAMUSCULAR; INTRAVENOUS; SUBCUTANEOUS
Status: DISPENSED
Start: 2018-09-02

## (undated) RX ORDER — SODIUM CHLORIDE 9 MG/ML
INJECTION, SOLUTION INTRAVENOUS
Status: DISPENSED
Start: 2018-09-02

## (undated) RX ORDER — BUPIVACAINE HYDROCHLORIDE AND EPINEPHRINE 5; 5 MG/ML; UG/ML
INJECTION, SOLUTION EPIDURAL; INTRACAUDAL; PERINEURAL
Status: DISPENSED
Start: 2018-09-02

## (undated) RX ORDER — BACITRACIN 50000 [IU]/1
INJECTION, POWDER, FOR SOLUTION INTRAMUSCULAR
Status: DISPENSED
Start: 2018-09-02